# Patient Record
Sex: MALE | Race: WHITE | NOT HISPANIC OR LATINO | Employment: OTHER | URBAN - METROPOLITAN AREA
[De-identification: names, ages, dates, MRNs, and addresses within clinical notes are randomized per-mention and may not be internally consistent; named-entity substitution may affect disease eponyms.]

---

## 2017-01-03 ENCOUNTER — ALLSCRIPTS OFFICE VISIT (OUTPATIENT)
Dept: OTHER | Facility: OTHER | Age: 67
End: 2017-01-03

## 2017-02-07 ENCOUNTER — GENERIC CONVERSION - ENCOUNTER (OUTPATIENT)
Dept: OTHER | Facility: OTHER | Age: 67
End: 2017-02-07

## 2017-03-21 ENCOUNTER — ALLSCRIPTS OFFICE VISIT (OUTPATIENT)
Dept: OTHER | Facility: OTHER | Age: 67
End: 2017-03-21

## 2017-03-22 ENCOUNTER — GENERIC CONVERSION - ENCOUNTER (OUTPATIENT)
Dept: OTHER | Facility: OTHER | Age: 67
End: 2017-03-22

## 2017-03-29 ENCOUNTER — ALLSCRIPTS OFFICE VISIT (OUTPATIENT)
Dept: OTHER | Facility: OTHER | Age: 67
End: 2017-03-29

## 2017-05-30 ENCOUNTER — ALLSCRIPTS OFFICE VISIT (OUTPATIENT)
Dept: OTHER | Facility: OTHER | Age: 67
End: 2017-05-30

## 2017-06-08 ENCOUNTER — ALLSCRIPTS OFFICE VISIT (OUTPATIENT)
Dept: OTHER | Facility: OTHER | Age: 67
End: 2017-06-08

## 2017-06-22 ENCOUNTER — GENERIC CONVERSION - ENCOUNTER (OUTPATIENT)
Dept: OTHER | Facility: OTHER | Age: 67
End: 2017-06-22

## 2017-06-22 LAB
A/G RATIO (HISTORICAL): 1.8 (ref 1.2–2.2)
ALBUMIN SERPL BCP-MCNC: 4.2 G/DL (ref 3.6–4.8)
ALP SERPL-CCNC: 46 IU/L (ref 39–117)
ALT SERPL W P-5'-P-CCNC: 15 IU/L (ref 0–44)
AST SERPL W P-5'-P-CCNC: 14 IU/L (ref 0–40)
BILIRUB SERPL-MCNC: 0.6 MG/DL (ref 0–1.2)
BUN SERPL-MCNC: 17 MG/DL (ref 8–27)
BUN/CREA RATIO (HISTORICAL): 19 (ref 10–24)
CALCIUM SERPL-MCNC: 9.3 MG/DL (ref 8.6–10.2)
CHLORIDE SERPL-SCNC: 101 MMOL/L (ref 96–106)
CHOLEST SERPL-MCNC: 152 MG/DL (ref 100–199)
CO2 SERPL-SCNC: 24 MMOL/L (ref 18–29)
CREAT SERPL-MCNC: 0.9 MG/DL (ref 0.76–1.27)
EGFR AFRICAN AMERICAN (HISTORICAL): 103 ML/MIN/1.73
EGFR-AMERICAN CALC (HISTORICAL): 89 ML/MIN/1.73
GLUCOSE SERPL-MCNC: 84 MG/DL (ref 65–99)
HBA1C MFR BLD HPLC: 6.2 % (ref 4.8–5.6)
HDLC SERPL-MCNC: 51 MG/DL
HEPATITIS C ANTIBODY (HISTORICAL): <0.1 S/CO RATIO (ref 0–0.9)
INTERPRETATION (HISTORICAL): NORMAL
LDLC SERPL CALC-MCNC: 88 MG/DL (ref 0–99)
POTASSIUM SERPL-SCNC: 5 MMOL/L (ref 3.5–5.2)
SODIUM SERPL-SCNC: 141 MMOL/L (ref 134–144)
TOT. GLOBULIN, SERUM (HISTORICAL): 2.3 G/DL (ref 1.5–4.5)
TOTAL PROTEIN (HISTORICAL): 6.5 G/DL (ref 6–8.5)
TRIGL SERPL-MCNC: 67 MG/DL (ref 0–149)

## 2017-06-23 ENCOUNTER — GENERIC CONVERSION - ENCOUNTER (OUTPATIENT)
Dept: OTHER | Facility: OTHER | Age: 67
End: 2017-06-23

## 2017-06-27 ENCOUNTER — ALLSCRIPTS OFFICE VISIT (OUTPATIENT)
Dept: OTHER | Facility: OTHER | Age: 67
End: 2017-06-27

## 2017-07-13 ENCOUNTER — ALLSCRIPTS OFFICE VISIT (OUTPATIENT)
Dept: OTHER | Facility: OTHER | Age: 67
End: 2017-07-13

## 2017-07-13 DIAGNOSIS — R05.9 COUGH: ICD-10-CM

## 2017-07-17 ENCOUNTER — HOSPITAL ENCOUNTER (OUTPATIENT)
Dept: RADIOLOGY | Facility: HOSPITAL | Age: 67
Discharge: HOME | End: 2017-07-17
Payer: MEDICARE

## 2017-07-17 ENCOUNTER — TRANSCRIBE ORDERS (OUTPATIENT)
Dept: ADMINISTRATIVE | Facility: HOSPITAL | Age: 67
End: 2017-07-17

## 2017-07-17 DIAGNOSIS — R05.9 COUGH: ICD-10-CM

## 2017-07-17 PROCEDURE — 71020 HB CHEST X-RAY 2VW FRONTAL&LATL: CPT

## 2017-07-18 ENCOUNTER — GENERIC CONVERSION - ENCOUNTER (OUTPATIENT)
Dept: OTHER | Facility: OTHER | Age: 67
End: 2017-07-18

## 2017-08-08 ENCOUNTER — ALLSCRIPTS OFFICE VISIT (OUTPATIENT)
Dept: OTHER | Facility: OTHER | Age: 67
End: 2017-08-08

## 2017-09-06 ENCOUNTER — ALLSCRIPTS OFFICE VISIT (OUTPATIENT)
Dept: OTHER | Facility: OTHER | Age: 67
End: 2017-09-06

## 2017-09-25 ENCOUNTER — GENERIC CONVERSION - ENCOUNTER (OUTPATIENT)
Dept: OTHER | Facility: OTHER | Age: 67
End: 2017-09-25

## 2017-09-25 LAB
A/G RATIO (HISTORICAL): 1.7 (ref 1.2–2.2)
ALBUMIN SERPL BCP-MCNC: 4.4 G/DL (ref 3.6–4.8)
ALP SERPL-CCNC: 47 IU/L (ref 39–117)
ALT SERPL W P-5'-P-CCNC: 18 IU/L (ref 0–44)
AST SERPL W P-5'-P-CCNC: 18 IU/L (ref 0–40)
BILIRUB SERPL-MCNC: 0.8 MG/DL (ref 0–1.2)
BUN SERPL-MCNC: 13 MG/DL (ref 8–27)
BUN/CREA RATIO (HISTORICAL): 15 (ref 10–24)
CALCIUM SERPL-MCNC: 9.3 MG/DL (ref 8.6–10.2)
CHLORIDE SERPL-SCNC: 99 MMOL/L (ref 96–106)
CO2 SERPL-SCNC: 26 MMOL/L (ref 18–29)
CREAT SERPL-MCNC: 0.84 MG/DL (ref 0.76–1.27)
EGFR AFRICAN AMERICAN (HISTORICAL): 105 ML/MIN/1.73
EGFR-AMERICAN CALC (HISTORICAL): 91 ML/MIN/1.73
GLUCOSE SERPL-MCNC: 86 MG/DL (ref 65–99)
HBA1C MFR BLD HPLC: 6.2 % (ref 4.8–5.6)
POTASSIUM SERPL-SCNC: 4.5 MMOL/L (ref 3.5–5.2)
SODIUM SERPL-SCNC: 140 MMOL/L (ref 134–144)
TOT. GLOBULIN, SERUM (HISTORICAL): 2.6 G/DL (ref 1.5–4.5)
TOTAL PROTEIN (HISTORICAL): 7 G/DL (ref 6–8.5)

## 2017-09-26 ENCOUNTER — GENERIC CONVERSION - ENCOUNTER (OUTPATIENT)
Dept: OTHER | Facility: OTHER | Age: 67
End: 2017-09-26

## 2017-09-27 ENCOUNTER — GENERIC CONVERSION - ENCOUNTER (OUTPATIENT)
Dept: OTHER | Facility: OTHER | Age: 67
End: 2017-09-27

## 2017-09-27 ENCOUNTER — ALLSCRIPTS OFFICE VISIT (OUTPATIENT)
Dept: OTHER | Facility: OTHER | Age: 67
End: 2017-09-27

## 2017-10-06 ENCOUNTER — GENERIC CONVERSION - ENCOUNTER (OUTPATIENT)
Dept: OTHER | Facility: OTHER | Age: 67
End: 2017-10-06

## 2017-10-17 ENCOUNTER — ALLSCRIPTS OFFICE VISIT (OUTPATIENT)
Dept: OTHER | Facility: OTHER | Age: 67
End: 2017-10-17

## 2017-10-18 NOTE — PROGRESS NOTES
Assessment  1  Atherosclerosis of arteries of extremities (440 20) (I70 209)   2  Onychomycosis (110 1) (B35 1)   3  Diabetes mellitus with polyneuropathy (250 60,357 2) (E11 42)   4  Pain in both feet (729 5) (M79 671,M79 672)    Plan   · Follow-up visit in 9 weeks Evaluation and Treatment  Follow-up  Status: Hold For -  Scheduling  Requested for: 71HVJ9395   · Inspect your feet daily ; Status:Complete;   Done: 19NFC5542 09:51AM    Discussion/Summary    Daily foot checks  Discussed proper shoes discussed risk of ulceration and infection  multivitamin or biotin for cracking of nails  Discussed antifungal powder between toes daily  Daily foot checks monitor for signs of infection  The patient was counseled regarding instructions for management,-- risk factor reductions,-- patient and family education  Possible side effects of new medications were reviewed with the patient/guardian today  The treatment plan was reviewed with the patient/guardian  The patient/guardian understands and agrees with the treatment plan      Chief Complaint  Patient has painful calluses and thick painful nails  has had some cracking of the left hallux nail in the past month  No history of injury  also has occasional pain in left fourth digit does have contracted hammertoe thinks he may have broken it at some point a few years ago  But was never treated  History of Present Illness  HPI: History of RSD of the right lower extremity  He has pain in his toes which shoe wear  has not noticed any redness or signs of infection  Active Problems  1  Atherosclerosis of arteries of extremities (440 20) (I70 209)   2  Benign essential hypertension (401 1) (I10)   3  BMI 30 0-30 9,adult (V85 30) (Z68 30)   4  Callus (700) (L84)   5  Canker sore (528 2) (K12 0)   6  Cough (786 2) (R05)   7  Diabetes mellitus (250 00) (E11 9)   8  Diabetes mellitus with polyneuropathy (250 60,357 2) (E11 42)   9  Hyperlipidemia (272 4) (E78 5)   10   Impotence (607 84) (N52 9)   11  Myocardial infarction (410 90) (I21 9)   12  Nerve palsy (355 9) (G58 9)   13  Onychomycosis (110 1) (B35 1)   14  Pain in both feet (729 5) (M79 671,M79 672)   15  Peripheral neuropathy (356 9) (G62 9)   16  Reflex sympathetic dystrophy (337 20) (G90 50)   17   Tabes dorsalis (094 0) (A52 11)    Past Medical History   · History of Acquired ankle/foot deformity (736 70) (M21 969)   · History of Acute Myocardial Infarction (V12 59)   · History of Anxiety (300 00) (F41 9)   · History of BMI 27 0-27 9,adult (V85 23) (Z68 27)   · History of BMI 29 0-29 9,adult (V85 25) (Z68 29)   · History of Cardiomyopathy (425 4) (I42 9)   · History of Congestive heart failure (428 0) (I50 9)   · History of Congestive heart failure (428 0) (I50 9)   · History of Coronary Artery Disease (V12 59)   · History of Dermatitis of foot (692 9) (L30 9)   · History of Dysesthesia (782 0) (R20 8)   · History of Exposure to hepatitis (V01 79) (Z20 5)   · History of Fissure of skin (709 8) (R23 4)   · History of Foot slap (781 2) (R26 89)   · History of anemia (V12 3) (Z86 2)   · History of chest pain (V13 89) (K54 699)   · History of contact dermatitis (V13 3) (Z87 2)   · History of ingrown nail (V13 3) (Z87 2)   · History of Ingrowing nail with infection (703 0) (L60 0)   · Initial Medicare annual wellness visit (V70 0) (Z00 00)   · History of Injury of tip of finger of right hand (959 5) (S69 91XA)   · History of Mallet finger of right hand (736 1) (M20 011)   · History of Nails Onycholysis (703 8)   · History of Need for 23-polyvalent pneumococcal polysaccharide vaccine (V03 82) (Z23)   · History of Need for hepatitis C screening test (V73 89) (Z11 59)   · History of Need for influenza vaccination (V04 81) (Z23)   · Need for pneumococcal vaccination (V03 82) (Z23)   · History of Need for Tdap vaccination (V06 1) (Z23)   · History of Need for vaccination with 13-polyvalent pneumococcal conjugate vaccine  (V03 82) (Z23)   · 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 02)   · History of Paronychia of toe, unspecified laterality (681 11) (L03 039)   · History of Patellar bursitis of right knee (726 60) (M70 51)   · History of Right knee pain (719 46) (M25 561)   · History of Screening PSA (prostate specific antigen) (V76 44) (Z12 5)   · History of Subconjunctival hemorrhage, unspecified laterality (372 72) (H11 30)   · History of Xerosis cutis (706 8) (L85 3)    Surgical History   · History of Cath Stent Placement   · History of Cholecystectomy   · History of Hernia Repair    Family History  Mother    · Family history of    · Family history of cardiac disorder (V17 49) (Z80 55)  Father    · Family history of    · Family history of leukemia (V16 6) (Z80 6)  Sister    · Family history of Malignant neoplasm of ovary, unspecified laterality    Social History   · Denied: History of Alcohol Use (History)   · Never a smoker  The social history was reviewed and is unchanged  Current Meds   1  Aspirin 325 MG Oral Tablet; TAKE 1 TABLET DAILY; Therapy: (Recorded:2014) to Recorded   2  Culinary Agents In [employer]; test once daily; Therapy: 34Npi4393 to (Evaluate:25Fnr2237)  Requested for: 20Nje9618; Last   Rx:14Iaa7339 Ordered   3  Rosa Maria Microlet Lancets Miscellaneous; TEST ONCE DAILY; Therapy: 70Jrk3420 to (Last Rx:05Xys4158)  Requested for: 85Aos4886 Ordered   4  Clopidogrel Bisulfate 75 MG Oral Tablet; TAKE 1 TABLET DAILY  Requested for:   39RUL3573; Last Rx:22Dwu2775 Ordered   5  Contour Next One KIT; USE AS DIRECTED; Therapy: 90HTL5890 to (Last CI:06PPV8141)  Requested for: 29ZOP2717 Ordered   6  Gabapentin 100 MG Oral Capsule; TAKE 1 CAPSULE TWICE DAILY takes with   gabapentin 400mg qid  Requested for: 51NMV8633; Last Rx:89Uhz2023 Ordered   7  Gabapentin 400 MG Oral Capsule; TAKE 1 CAPSULE 4 TIMES DAILY  Requested for:   68THI2690; Last Rx:04Asw8025 Ordered   8   Glimepiride 2 MG Oral Tablet; TAKE ONE TABLET BY MOUTH EVERY DAY AS   DIRECTED; Therapy: 25LIE4401 to (Evaluate:64Xug3857)  Requested for: 46PSQ2044; Last   Rx:29Mar2017 Ordered   9  Metoprolol Tartrate 25 MG Oral Tablet; TAKE 1 TABLET DAILY  Requested for:   05HJR3510; Last Rx:11Aug2015 Ordered   10  Pravastatin Sodium 40 MG Oral Tablet; TAKE 1 TABLET DAILY  Requested for:    79WJE5164; Last Rx:11Aug2015 Ordered   11  ValACYclovir HCl - 1 GM Oral Tablet; 2 tablets Q12 times two doses at start of canker    sore; Therapy: 35NUF1501 to (Last Rx:80Hgm6997)  Requested for: 09Efi1582 Ordered    The medication list was reviewed and updated today  Allergies  1  No Known Drug Allergies    Vitals   Recorded: 70CGQ5302 09:36AM   BP Comments Patient refused   Height 5 ft 9 in   Weight 209 lb    BMI Calculated 30 86   BSA Calculated 2 1     Physical Exam    Constitutional: no acute distress, well appearing and well nourished  Orthopedic/Biomechanical: ingrown nails  Skin: Left hallux, decreased erythema decreased exudate, resolving, infection  Neurologic: decreased vibration sensation  Psychiatric: oriented to person, place, and time  Left Foot: Appearance: Normal except as noted: excessive pronation-- and-- pes planus  Great toe deformities include a bunion  Second toe deformities include hammer toe  Third toe deformities include hammer toe  Forth toe deformities include hammer toe  Fifth toe deformities include hammer toe  Evaluation of the great toe nail demonstrates paronychia-- and-- an ingrown nail  Evaluation of the fourth toe nail demonstrates subungual hematoma  Dorathy Infield +1 pitting edema bilateral feet and ankles negative calf tenderness negative Homans sign  ROM: Full  Special Tests: rash left hallux nail is cracked distal border has mild thickening but no signs of infection  Left fourth digit adductovarus hammer toe no pain on palpation or range of motion     Right Foot: Appearance: Normal except as noted: excessive pronation-- and-- pes planus  Second toe deformities include hammer toe  Third toe deformities include hammer toe  Forth toe deformities include hammer toe  Fifth toe deformities include hammer toe  Evaluation of the great toe nail demonstrates an ingrown nail-- and-- subungual hematoma, but-- no paronychia  No erythema no signs of infection mild ingrown  Hyperkeratotic lesions distal second digit bilateral first met head bilateral negative ulceration negative erythema negative exudate  ROM: Full  Special Tests: mild xerosis no maceration  Left Ankle: ROM: limited ROM in all planes Motor: diffuse weakness  Right Ankle: ROM: limited ROM in all planes Motor: diffuse weakness  Neurological Exam: performed  Light touch was intact bilaterally  Vibratory sensation was decreased in the right ankle-- and-- decreased in the left first metatarsophalangeal joint  Response to monofilament test was intact bilaterally  Vascular Exam: performed Dorsalis pedis pulses were 2/4 bilaterally  Posterior tibial pulses were 1/4 bilaterally  Capillary refill time was between 1-3 seconds bilaterally  Toenails: All of the toenails were elongated,-- hypertrophied,-- tender-- and-- Mycotic with dystrophy  Diminished tactile sensation with monofilament testing throughout the right foot  Diminished tactile sensation with monofilament testing throughout the left foot  Capillary refills findings on the right were delayed in the toes  Pulses:   0 in the posterior tibialis on the right   1+ in the dorsalis pedis on the right  Capillary refills findings on the left were delayed in the toes  Pulses:   0 in the posterior tibialis on the left   1+ in the dorsalis pedis on the left  Assign Risk Category: 2: Loss of protective sensation with or without weakness, deformity, callus, pre-ulcer, or history of ulceration  High risk     Hyperkeratosis: present on both second toes,-- present on the right second toe,-- present on both first sub metatarsals-- and-- present on both fifth sub metatarsals  Shoe Gear Evaluation: performed ()  Right Foot: width: e-- and-- length: 11  Left Foot: width: e-- and-- length: 11  Recommendation(s): extra depth diabetic shoes-- and-- SAS style  Procedure  Aseptic debridement of hyperkeratotic lesions fifth met head and Second digit bilateraldebridement and planing of nails ×10, with nail nipper and nail , no complications      Future Appointments    Date/Time Provider Specialty Site   12/22/2017 09:45 AM Yahaira Johnson, [address] [place] [address]     Signatures   Electronically signed by :  Monique Diana DPM; Oct 17 2017  9:53AM EST                       (Author)

## 2017-12-16 ENCOUNTER — GENERIC CONVERSION - ENCOUNTER (OUTPATIENT)
Dept: OTHER | Facility: OTHER | Age: 67
End: 2017-12-16

## 2017-12-16 LAB
A/G RATIO (HISTORICAL): 1.7 (ref 1.2–2.2)
ALBUMIN SERPL BCP-MCNC: 4.5 G/DL (ref 3.6–4.8)
ALP SERPL-CCNC: 50 IU/L (ref 39–117)
ALT SERPL W P-5'-P-CCNC: 18 IU/L (ref 0–44)
AST SERPL W P-5'-P-CCNC: 14 IU/L (ref 0–40)
BILIRUB SERPL-MCNC: 0.6 MG/DL (ref 0–1.2)
BUN SERPL-MCNC: 14 MG/DL (ref 8–27)
BUN/CREA RATIO (HISTORICAL): 14 (ref 10–24)
CALCIUM SERPL-MCNC: 9.7 MG/DL (ref 8.6–10.2)
CHLORIDE SERPL-SCNC: 101 MMOL/L (ref 96–106)
CHOLEST SERPL-MCNC: 153 MG/DL (ref 100–199)
CO2 SERPL-SCNC: 27 MMOL/L (ref 18–29)
CREAT SERPL-MCNC: 0.97 MG/DL (ref 0.76–1.27)
EGFR AFRICAN AMERICAN (HISTORICAL): 94 ML/MIN/1.73
EGFR-AMERICAN CALC (HISTORICAL): 81 ML/MIN/1.73
GLUCOSE SERPL-MCNC: 93 MG/DL (ref 65–99)
HDLC SERPL-MCNC: 53 MG/DL
LDLC SERPL CALC-MCNC: 78 MG/DL (ref 0–99)
POTASSIUM SERPL-SCNC: 4.9 MMOL/L (ref 3.5–5.2)
SODIUM SERPL-SCNC: 142 MMOL/L (ref 134–144)
TOT. GLOBULIN, SERUM (HISTORICAL): 2.7 G/DL (ref 1.5–4.5)
TOTAL PROTEIN (HISTORICAL): 7.2 G/DL (ref 6–8.5)
TRIGL SERPL-MCNC: 110 MG/DL (ref 0–149)

## 2017-12-17 ENCOUNTER — GENERIC CONVERSION - ENCOUNTER (OUTPATIENT)
Dept: OTHER | Facility: OTHER | Age: 67
End: 2017-12-17

## 2017-12-17 LAB
HBA1C MFR BLD HPLC: 6.2 % (ref 4.8–5.6)
INTERPRETATION (HISTORICAL): NORMAL

## 2017-12-22 ENCOUNTER — ALLSCRIPTS OFFICE VISIT (OUTPATIENT)
Dept: OTHER | Facility: OTHER | Age: 67
End: 2017-12-22

## 2017-12-23 NOTE — PROGRESS NOTES
Assessment   1  Benign essential hypertension (401 1) (I10)   2  Diabetes mellitus with polyneuropathy (250 60,357 2) (E11 42)   3  Hyperlipidemia (272 4) (E78 5)   4  Never a smoker    Plan   Diabetes mellitus with polyneuropathy    · (1) COMPREHENSIVE METABOLIC PANEL; Status:Active; Requested for:01Mar2018;    · (1) HEMOGLOBIN A1C; Status:Active; Requested for:01Mar2018;    · (1) MICROALBUMIN CREATININE RATIO, RANDOM URINE; Status:Active; Requested    for:01Mar2018;    · Follow-up visit in 3 months Evaluation and Treatment  Follow-up  Status: Complete -    Scheduling  Done: 93YPR3932 10:06AM  Hyperlipidemia    · Pravastatin Sodium 40 MG Oral Tablet; TAKE 1 TABLET DAILY  Myocardial infarction    · Clopidogrel Bisulfate 75 MG Oral Tablet (Plavix); TAKE 1 TABLET DAILY   · Metoprolol Tartrate 25 MG Oral Tablet; TAKE 1 TABLET DAILY    Discussion/Summary      Hypertension - Well controlled  - Well controlled A1c is 6 2  - Well controlled  Chief Complaint   pt present for 3 month follow up  af/rma      History of Present Illness   He has felt cranky over the past few years  The patient states his hyperlipidemia has been under good control since the last visit  Symptoms:      Medications: the patient is adherent with his medication regimen  -- He denies medication side effects  The patient presents for follow-up of essential hypertension  The patient states he has been doing well with his blood pressure control since the last visit  Symptoms:      Medications: the patient is adherent with his medication regimen  -- He denies medication side effects  Review of Systems        Constitutional: No fever or chills, feels well, no tiredness, no recent weight gain or weight loss  Cardiovascular: No complaints of slow heart rate, no fast heart rate, no chest pain, no palpitations, no leg claudication, no lower extremity  Active Problems   1   Atherosclerosis of arteries of extremities (440 20) (I70 209)   2  Benign essential hypertension (401 1) (I10)   3  BMI 30 0-30 9,adult (V85 30) (Z68 30)   4  Callus (700) (L84)   5  Canker sore (528 2) (K12 0)   6  Diabetes mellitus (250 00) (E11 9)   7  Diabetes mellitus with polyneuropathy (250 60,357 2) (E11 42)   8  Hyperlipidemia (272 4) (E78 5)   9  Impotence (607 84) (N52 9)   10  Myocardial infarction (410 90) (I21 9)   11  Nerve palsy (355 9) (G58 9)   12  Onychomycosis (110 1) (B35 1)   13  Pain in both feet (729 5) (M79 671,M79 672)   14  Peripheral neuropathy (356 9) (G62 9)   15  Reflex sympathetic dystrophy (337 20) (G90 50)   16  Tabes dorsalis (094 0) (A52 11)    Past Medical History   1  History of Acquired ankle/foot deformity (736 70) (M21 969)   2  History of Acute Myocardial Infarction (V12 59)   3  History of Anxiety (300 00) (F41 9)   4  History of BMI 27 0-27 9,adult (V85 23) (Z68 27)   5  History of BMI 29 0-29 9,adult (V85 25) (Z68 29)   6  History of Cardiomyopathy (425 4) (I42 9)   7  History of Congestive heart failure (428 0) (I50 9)   8  History of Congestive heart failure (428 0) (I50 9)   9  History of Coronary Artery Disease (V12 59)   10  History of Dermatitis of foot (692 9) (L30 9)   11  History of Dysesthesia (782 0) (R20 8)   12  History of Exposure to hepatitis (V01 79) (Z20 5)   13  History of Fissure of skin (709 8) (R23 4)   14  History of Foot slap (781 2) (R26 89)   15  History of anemia (V12 3) (Z86 2)   16  History of chest pain (V13 89) (Z87 898)   17  History of contact dermatitis (V13 3) (Z87 2)   18  History of cough   19  History of ingrown nail (V13 3) (Z87 2)   20  History of Ingrowing nail with infection (703 0) (L60 0)   21  Initial Medicare annual wellness visit (V70 0) (Z00 00)   22  History of Injury of tip of finger of right hand (959 5) (S69 91XA)   23  History of Mallet finger of right hand (736 1) (M20 011)   24  History of Nails Onycholysis (703 8)   25   History of Need for 23-polyvalent pneumococcal polysaccharide vaccine (V03 82) (Z23)   26  History of Need for hepatitis C screening test (V73 89) (Z11 59)   27  History of Need for influenza vaccination (V04 81) (Z23)   28  Need for pneumococcal vaccination (V03 82) (Z23)   29  History of Need for Tdap vaccination (V06 1) (Z23)   30  History of Need for vaccination with 13-polyvalent pneumococcal conjugate vaccine      (V03 82) (Z23)   31  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 02)   32  History of Paronychia of toe, unspecified laterality (681 11) (L03 039)   33  History of Patellar bursitis of right knee (726 60) (M70 51)   34  History of Right knee pain (719 46) (M25 561)   35  History of Screening PSA (prostate specific antigen) (V76 44) (Z12 5)   36  History of Subconjunctival hemorrhage, unspecified laterality (372 72) (H11 30)   37  History of Xerosis cutis (706 8) (L85 3)    Surgical History   1  History of Cath Stent Placement   2  History of Cholecystectomy   3  History of Hernia Repair    Family History   Mother    1  Family history of    2  Family history of cardiac disorder (V17 49) (Z82 49)  Father    3  Family history of    4  Family history of leukemia (V16 6) (Z80 6)  Sister    5  Family history of Malignant neoplasm of ovary, unspecified laterality    Social History    · Denied: History of Alcohol Use (History)   · Never a smoker  The social history was reviewed and updated today  Current Meds    1  Aspirin 325 MG Oral Tablet; TAKE 1 TABLET DAILY; Therapy: (Recorded:2014) to Recorded   2  Velocomp In [employer]; test once daily; Therapy: 14Jen1664 to (Evaluate:09Ihp7218)  Requested for: 09Syv5100; Last     Rx:01Wbs4148 Ordered   3  Rosa Maria Microlet Lancets Miscellaneous; TEST ONCE DAILY; Therapy: 05Jes1787 to (Last Rx:88Qgy0318)  Requested for: 29Den6553 Ordered   4   Clopidogrel Bisulfate 75 MG Oral Tablet; TAKE 1 TABLET DAILY  Requested for:     31FIK2529; Last Rx:2015 Ordered   5  Contour Next One KIT; USE AS DIRECTED; Therapy: 47RRG4808 to (Last YQ:44VRM5268)  Requested for: 30JLI3910 Ordered   6  Gabapentin 100 MG Oral Capsule; TAKE 1 CAPSULE TWICE DAILY takes with     gabapentin 400mg qid  Requested for: 16CDS1909; Last Rx:82Osi6654 Ordered   7  Gabapentin 400 MG Oral Capsule; TAKE 1 CAPSULE 4 TIMES DAILY  Requested for:     88RSD1466; Last Rx:68Hsz1854 Ordered   8  Glimepiride 2 MG Oral Tablet; TAKE ONE TABLET BY MOUTH EVERY DAY AS DIRECTED; Therapy: 40YWT9721 to (Courtneyry Shearing)  Requested for: 21Nov2017; Last     Rx:21Nov2017 Ordered   9  Metoprolol Tartrate 25 MG Oral Tablet; TAKE 1 TABLET DAILY  Requested for:     14GBE1806; Last Rx:11Aug2015 Ordered   10  Pravastatin Sodium 40 MG Oral Tablet; TAKE 1 TABLET DAILY  Requested for:      02DYL5903; Last Rx:11Aug2015 Ordered    Allergies   1  No Known Drug Allergies    Vitals   Vital Signs    Recorded: 22Dec2017 09:32AM   Temperature 97 8 F   Heart Rate 68   Respiration 16   Systolic 966   Diastolic 74   Height 5 ft 9 in   Weight 207 lb 2 oz   BMI Calculated 30 59   BSA Calculated 2 1     Physical Exam        Constitutional      General appearance: No acute distress, well appearing and well nourished  Ears, Nose, Mouth, and Throat      External inspection of ears and nose: Normal        Otoscopic examination: Tympanic membrance translucent with normal light reflex  Canals patent without erythema  Oropharynx: Normal with no erythema, edema, exudate or lesions  Pulmonary      Auscultation of lungs: Clear to auscultation, equal breath sounds bilaterally, no wheezes, no rales, no rhonci  no rales or crackles were heard bilaterally  no rhonchi  no friction rub  no wheezing  Cardiovascular      Auscultation of heart: Normal rate and rhythm, normal S1 and S2, without murmurs         Musculoskeletal      Gait and station: Normal           Health Management   Diabetes mellitus with neuropathy   *VB - Eye Exam; every 1 year; Last 98LHI0633; Next Due: 93DHD4977;  Active    Future Appointments      Date/Time Provider Specialty Site   01/03/2018 09:30 AM Kaye Davis DPM Podiatry [address] [facility]    03/20/2018 09:15 AM Yahaira Koehler, [address] [place] [address]     Signatures    Electronically signed by : Travon Herman DO; Dec 22 2017 10:08AM EST                       (Author)

## 2018-01-03 ENCOUNTER — ALLSCRIPTS OFFICE VISIT (OUTPATIENT)
Dept: OTHER | Facility: OTHER | Age: 68
End: 2018-01-03

## 2018-01-10 NOTE — PROGRESS NOTES
Assessment    1  Callus (700) (L84)   2  Diabetes mellitus with neuropathy (250 60,357 2) (E11 40)   3  Atherosclerosis of arteries of extremities (440 20) (I70 209)   4  Onychomycosis (110 1) (B35 1)   5  Pain in both feet (729 5) (M79 671,M79 672)    Plan    · *VB-Foot Exam; Status:Complete;   Done: 53FRT2594 09:25AM    · Follow-up visit in 2 months Evaluation and Treatment  Follow-up  Status: Hold For -  Scheduling  Requested for: 12UOO5973   · Diabetes Foot Exam Performed; Status:Complete;   Done: 82HIZ8835 09:25AM   · Inspect your feet daily ; Status:Complete;   Done: 37HYR2886 09:25AM    Discussion/Summary    Discussed proper diabetic foot care daily foot checks  Discussed diabetic shoes  Discussed risks of ulceration and infection  Possible side effects of new medications were reviewed with the patient/guardian today  The treatment plan was reviewed with the patient/guardian  The patient/guardian understands and agrees with the treatment plan      Chief Complaint  Patient has painful calluses and thick painful nails that hurt when walking and wearing shoes  Patient has some burning and numbness in feet  Says blood sugar has been well controlled  Says his feet are very cold and sometimes gets swollen at the end of day      History of Present Illness  HPI: History of RSD of the right lower extremity  He has pain in his toes which shoe wear  Active Problems    1  Acquired ankle/foot deformity (736 70) (M21 969)   2  Atherosclerosis of arteries of extremities (440 20) (I70 209)   3  Benign essential hypertension (401 1) (I10)   4  Callus (700) (L84)   5  Chest pain (786 50) (R07 9)   6  Controlled diabetes mellitus (250 00) (E11 9)   7  Coronary arteriosclerosis (414 00) (I25 10)   8  Coronary arteriosclerosis in native artery (414 01) (I25 10)   9  Cough (786 2) (R05)   10  Diabetes mellitus with neuropathy (250 60,357 2) (E11 40)   11  Diabetic neuropathy (250 60,357 2) (E11 40)   12   Dysesthesia (782  0) (R20 8)   13  Hyperlipidemia (272 4) (E78 5)   14  Ingrowing nail with infection (703 0) (L60 0)   15  Injury of tip of finger of right hand (959 5) (S69 91XA)   16  Mallet finger of right hand (736 1) (M20 011)   17  Nails Onycholysis (703 8)   18  Need for 23-polyvalent pneumococcal polysaccharide vaccine (V03 82) (Z23)   19  Need for influenza vaccination (V04 81) (Z23)   20  Need for vaccination with 13-polyvalent pneumococcal conjugate vaccine (V03 82) (Z23)   21  Onychomycosis (110 1) (B35 1)   22  Pain in both feet (729 5) (M79 671,M79 672)   23  Pain in extremity (729 5) (M79 609)   24  Paronychia of toe, unspecified laterality (681 11) (L03 039)   25  Peripheral neuropathy (356 9) (G62 9)   26  Reflex sympathetic dystrophy (337 20) (G90 50)   27  Screening PSA (prostate specific antigen) (V76 44) (Z12 5)   28  Subconjunctival hemorrhage, unspecified laterality (372 72) (H11 30)   29  Sympathetic reflex dystrophy, lower limb (337 22) (G90 529)   30  Xerosis cutis (706 8) (L85 3)    Past Medical History    · History of Acute Myocardial Infarction (V12 59)   · History of Anxiety (300 00) (F41 9)   · History of Cardiomyopathy (425 4) (I42 9)   · History of Congestive heart failure (428 0) (I50 9)   · History of Congestive heart failure (428 0) (I50 9)   · History of Coronary Artery Disease (V12 59)   · History of anemia (V12 3) (Z86 2)    Family History    · Family history of    · Family history of cardiac disorder (V17 49) (Z82 49)    · Family history of    · Family history of leukemia (V16 6) (Z80 6)    · Family history of Malignant neoplasm of ovary, unspecified laterality    Social History    · Denied: History of Alcohol Use (History)   · Never A Smoker  The social history was reviewed and is unchanged  Current Meds   1  Aspirin 325 MG Oral Tablet; TAKE 1 TABLET DAILY; Therapy: (Recorded:2014) to Recorded   2   Rosa [employer] Contour Next Test In [employer]; TEST ONCE DAILY; Therapy: 45Agd6387 to (Evaluate:28Oct2016)  Requested for: 30XOS0164; Last   Rx:03Nov2015 Ordered   3  Rosa Maria Microlet Lancets Miscellaneous; TEST ONCE DAILY; Therapy: 40Tnt5810 to (Evaluate:16Apr2017)  Requested for: [phone]; Last   Rx:94Bzt2215 Ordered   4  Clopidogrel Bisulfate 75 MG Oral Tablet; TAKE 1 TABLET DAILY  Requested for:   11Aug2015; Last Rx:11Aug2015 Ordered   5  Gabapentin 100 MG Oral Capsule; TAKE 1 CAPSULE TWICE DAILY takes with   gabapentin 400mg qid  Requested for: 11Aug2015; Last Rx:27Lns1306 Ordered   6  Gabapentin 400 MG Oral Capsule; TAKE 1 CAPSULE 4 TIMES DAILY  Requested for:   11Aug2015; Last Rx:11Aug2015 Ordered   7  Glimepiride 2 MG Oral Tablet; TAKE ONE TABLET BY MOUTH EVERY DAY AS   DIRECTED; Therapy: 41LEJ2313 to (Evaluate:03Zkk9104)  Requested for: 11Aug2015; Last   Rx:11Aug2015 Ordered   8  Metoprolol Tartrate 25 MG Oral Tablet; TAKE 1 TABLET DAILY  Requested for:   11Aug2015; Last Rx:11Aug2015 Ordered   9  Pravastatin Sodium 40 MG Oral Tablet; TAKE 1 TABLET DAILY  Requested for:   11Aug2015; Last Rx:11Aug2015 Ordered    The medication list was reviewed and updated today  Allergies    1  No Known Drug Allergies    Vitals   Recorded: 78SAB0738 93:69CD   Systolic 651   Diastolic 90   Height 6 ft 8 5 in   Weight 189 lb    BMI Calculated 20 51   BSA Calculated 2 26     Physical Exam    Constitutional: no acute distress, well appearing and well nourished  Orthopedic/Biomechanical: ingrown nails  Skin: Left hallux, decreased erythema decreased exudate, resolving, infection  Neurologic: decreased vibration sensation  Psychiatric: oriented to person, place, and time  Left Foot: Appearance: Normal except as noted: excessive pronation and pes planus  Great toe deformities include a bunion  Second toe deformities include hammer toe  Third toe deformities include hammer toe  Forth toe deformities include hammer toe  Fifth toe deformities include hammer toe   Evaluation of the great toe nail demonstrates no paronychia and no ingrown nail  Evaluation of the fourth toe nail demonstrates subungual hematoma  Negative erythema negative signs of infection negative ulceration  ROM: Full  Special Tests: Hyperkeratotic lesions distal second digit bilateral first digit bilateral negative erythema negative exudate negative ulceration  Right Foot: Appearance: Normal except as noted: excessive pronation and pes planus  Second toe deformities include hammer toe  Third toe deformities include hammer toe  Forth toe deformities include hammer toe  Fifth toe deformities include hammer toe  Hyperkeratotic lesions distal second digit bilateral first met head bilateral negative ulceration negative erythema negative exudate  ROM: Full  Special Tests: Negative ulceration negative maceration  Left Ankle: ROM: limited ROM in all planes Motor: diffuse weakness  Right Ankle: ROM: limited ROM in all planes Motor: diffuse weakness  Neurological Exam: performed  Light touch was intact bilaterally  Vibratory sensation was decreased in the right ankle and decreased in the left first metatarsophalangeal joint  Response to monofilament test was intact bilaterally  Vascular Exam: performed Dorsalis pedis pulses were 2/4 bilaterally  Posterior tibial pulses were 1/4 bilaterally  Capillary refill time was between 1-3 seconds bilaterally  Toenails: All of the toenails were elongated, hypertrophied, tender and Mycotic with dystrophy  Socks and shoes removed, the Right Foot: the foot was dry and with a(n) ~Ucm3 callus  The sensory exam showed diminished vibratory sensation at the level of the toes, but normal position sense at the level of the toes  Diminished tactile sensation with monofilament testing throughout the right foot  Socks and shoes removed, Left Foot: the foot was dry and with a(n) ~Ucm3 callus     The sensory exam showed diminished vibratory sensation at the level of the toes, but normal position sense at the level of the toes  Diminished tactile sensation with monofilament testing throughout the left foot  Capillary refills findings on the right were delayed in the toes  Pulses:   0 in the posterior tibialis on the right   1+ in the dorsalis pedis on the right  Capillary refills findings on the left were delayed in the toes  Pulses:   0 in the posterior tibialis on the left   1+ in the dorsalis pedis on the left  Assign Risk Category: 2: Loss of protective sensation with or without weakness, deformity, callus, pre-ulcer, or history of ulceration  High risk  Hyperkeratosis: present on both second toes and present on both first sub metatarsals  Shoe Gear Evaluation: performed ()  Right Foot: width: e and length: 11  Left Foot: width: e and length: 11  Recommendation(s): extra depth diabetic shoes and SAS style  Procedure  Aseptic debridement tyloma x4  Aseptic debridement and planing of nails x10, manually, and mechanically  Future Appointments    Date/Time Provider Specialty Site   02/11/2016 01:00 PM Chelsie Espinoza DO [facility]     Signatures   Electronically signed by :  Earline Jo DPM; Jan 19 2016  9:28AM EST                       (Author)

## 2018-01-10 NOTE — RESULT NOTES
Discussion/Summary   Appointment 9/27/17   Please print and put in my folder     Dr Oskar Burris      Verified Results  (1) COMPREHENSIVE METABOLIC PANEL 46NHC6472 49:94OK Francine Samuel     Test Name Result Flag Reference   Glucose, Serum 86 mg/dL  65-99   BUN 13 mg/dL  8-27   Creatinine, Serum 0 84 mg/dL  0 76-1 27   BUN/Creatinine Ratio 15  10-24   Sodium, Serum 140 mmol/L  134-144   Potassium, Serum 4 5 mmol/L  3 5-5 2   Chloride, Serum 99 mmol/L     Carbon Dioxide, Total 26 mmol/L  18-29   Calcium, Serum 9 3 mg/dL  8 6-10 2   Protein, Total, Serum 7 0 g/dL  6 0-8 5   Albumin, Serum 4 4 g/dL  3 6-4 8   Globulin, Total 2 6 g/dL  1 5-4 5   A/G Ratio 1 7  1 2-2 2   Bilirubin, Total 0 8 mg/dL  0 0-1 2   Alkaline Phosphatase, S 47 IU/L     AST (SGOT) 18 IU/L  0-40   ALT (SGPT) 18 IU/L  0-44   eGFR If NonAfricn Am 91 mL/min/1 73  >59   eGFR If Africn Am 105 mL/min/1 73  >59

## 2018-01-11 NOTE — PROGRESS NOTES
Assessment    1  Initial Medicare annual wellness visit (V70 0) (Z00 00)   2  Never a smoker   3  Diabetes mellitus (250 00) (E11 9)    Plan  Diabetes mellitus    · (1) COMPREHENSIVE METABOLIC PANEL; Status:Active; Requested for:56Hha9537;    · (1) HEMOGLOBIN A1C; Status:Active; Requested for:53Wxx1955;    · Follow-up visit in 3 months Evaluation and Treatment  Follow-up  Status: Complete -  Scheduling  Done: 35XLD9999 09:34AM    Discussion/Summary    Care plan given   Diabetes - improved, A1c is down to 6 2  Advised of risks of hypoglycemia and increased risk of heart attack with low blood sugars  he has not had any low sugars, but will be vigilant about them  Impression: Initial Annual Wellness Visit, with preventive exam as well as age and risk appropriate counseling completed  Cardiovascular screening and counseling: screening is current  Diabetes screening and counseling: screening not indicated and has diabetes  Colorectal cancer screening and counseling: screening is current  Prostate cancer screening and counseling: screening is current  Osteoporosis screening and counseling: screening not indicated  Abdominal aortic aneurysm screening and counseling: screening not indicated  Glaucoma screening and counseling: screening is current  HIV screening and counseling: screening not indicated  Immunizations: influenza vaccine is up to date this year, the lifetime pneumococcal vaccine has been completed, hepatitis B vaccination series is not indicated at this time due to the patient's low risk of brandon the disease and Zostavax vaccination up to date  Advance Directive Planning: not complete, paperwork and instructions were given to the patient, he was encouraged to follow-up with me to discuss his questions and/or decisions  Patient Discussion: plan discussed with the patient, follow-up visit needed in 3 months        Chief Complaint  annual wellness visit      History of Present Illness  HPI: He is watching his sugars  If he eats something not good for him it does go up  Welcome to [employer] and Wellness Visits: The patient is being seen for the initial annual wellness visit  Medicare Screening and Risk Factors   Hospitalizations: no previous hospitalizations and he has been hospitalized 0 times  Medicare Screening Tests Risk Questions   Osteoporosis risk assessment:  and over 48years of age  HIV risk assessment: none indicated  Drug and Alcohol Use: The patient has never smoked cigarettes  The patient reports never drinking alcohol  He has never used illicit drugs  Diet and Physical Activity: Current diet includes well balanced meals, low fat food choices, low carbohydrate food choices, low salt food choices, 2/3 servings of fruit per day, 1/2 servings of vegetables per day and 1 servings of meat per day  He exercises infrequently  Exercise: walking  Mood Disorder and Cognitive Impairment Screening: Anxiety screening denies anxiety  Depression screening  negative for symptoms  He denies feeling down, depressed, or hopeless over the past two weeks  He denies feeling little interest or pleasure in doing things over the past two weeks  Cognitive impairment screening: denies difficulty learning/retaining new information, denies difficulty handling complex tasks, denies difficulty with reasoning, denies difficulty with spatial ability and orientation, denies difficulty with language and denies difficulty with behavior  Functional Ability/Level of Safety: Hearing is normal bilaterally and a hearing aid is not used  He denies hearing difficulties  The patient is currently able to do activities of daily living without limitations, able to do instrumental activities of daily living without limitations, able to participate in social activities without limitations and able to drive without limitations   Activities of daily living details: does not need help using the phone, no transportation help needed, does not need help shopping, no meal preparation help needed, does not need help doing housework, does not need help doing laundry, does not need help managing medications and does not need help managing money  Fall risk factors: The patient fell 0 times in the past 12 months  Home safety risk factors:  no unfamiliar surroundings, no loose rugs, no poor household lighting, no uneven floors, no household clutter, grab bars in the bathroom and handrails on the stairs  Advance Directives: Advance directives: If there is something that can be done he wants it done, if its impossible don't do it , but no living will, no durable power of  for health care directives and no advance directives  end of life decisions were reviewed with the patient and I agree with the patient's decisions  Co-Managers and Medical Equipment/Suppliers: See Patient Care Team      Patient Care Team    Care Team Member Role Specialty Office Number   Norm Rausch [address] Specialist Optometry [phone]   Lai Cortes MD Specialist Cardiology [phone]   Lus Hashimoto MD Specialist Neurology [phone], [facility] [phone]     Active Problems    1  Atherosclerosis of arteries of extremities (440 20) (I70 209)   2  Benign essential hypertension (401 1) (I10)   3  BMI 30 0-30 9,adult (V85 30) (Z68 30)   4  Callus (700) (L84)   5  Diabetes mellitus (250 00) (E11 9)   6  Hyperlipidemia (272 4) (E78 5)   7  Myocardial infarction (410 90) (I21 3)   8  Nerve palsy (355 9) (G58 9)   9  Pain in both feet (729 5) (M79 671,M79 672)   10  Peripheral neuropathy (356 9) (G62 9)   11  Reflex sympathetic dystrophy (337 20) (G90 50)   12   Tabes dorsalis (094 0) (A52 11)    Past Medical History    · History of Acute Myocardial Infarction (V12 59)   · History of Anxiety (300 00) (F41 9)   · History of BMI 27 0-27 9,adult (V85 23) (Z68 27)   · History of BMI 29 0-29 9,adult (V85 25) (Z68 29)   · History of Cardiomyopathy (425 4) (I42 9)   · History of Congestive heart failure (428 0) (I50 9)   · History of Congestive heart failure (428 0) (I50 9)   · History of Coronary Artery Disease (V12 59)   · History of Cough (786 2) (R05)   · History of Dermatitis of foot (692 9) (L30 9)   · History of Dysesthesia (782 0) (R20 8)   · History of Exposure to hepatitis (V01 79) (Z20 5)   · History of Fissure of skin (709 8) (R23 4)   · History of anemia (V12 3) (Z86 2)   · History of chest pain (V13 89) (M29 624)   · History of contact dermatitis (V13 3) (Z87 2)   · History of ingrown nail (V13 3) (Z87 2)   · History of Ingrowing nail with infection (703 0) (L60 0)   · History of Injury of tip of finger of right hand (959 5) (S69 91XA)   · History of Nails Onycholysis (703 8)   · History of Need for 23-polyvalent pneumococcal polysaccharide vaccine (V03 82) (Z23)   · History of Need for hepatitis C screening test (V73 89) (Z11 59)   · History of Need for influenza vaccination (V04 81) (Z23)   · History of Need for Tdap vaccination (V06 1) (Z23)   · History of Need for vaccination with 13-polyvalent pneumococcal conjugate vaccine  (V03 82) (Z23)   · History of Paronychia of toe, unspecified laterality (681 11) (L03 039)   · History of Patellar bursitis of right knee (726 60) (M70 51)   · History of Right knee pain (719 46) (M25 561)   · History of Screening PSA (prostate specific antigen) (V76 44) (Z12 5)   · History of Subconjunctival hemorrhage, unspecified laterality (372 72) (H11 30)    Surgical History    · History of Cath Stent Placement   · History of Cholecystectomy   · History of Hernia Repair    Family History  Mother    · Family history of    · Family history of cardiac disorder (V17 49) (Z80 55)  Father    · Family history of    · Family history of leukemia (V16 6) (Z80 6)  Sister    · Family history of Malignant neoplasm of ovary, unspecified laterality    Social History    · Denied: History of Alcohol Use (History)   · Never a smoker  The social history was reviewed and updated today  Current Meds   1  Aspirin 325 MG Oral Tablet; TAKE 1 TABLET DAILY; Therapy: (Recorded:25Mar2014) to Recorded   2  [employer] In [employer]; test once daily; Therapy: 71Szh8893 to (Evaluate:13Xto8381)  Requested for: 54Lsc2068; Last   Rx:95Ejy2345 Ordered   3  Rosa Maria Microlet Lancets Miscellaneous; TEST ONCE DAILY; Therapy: 55Myo2930 to (Evaluate:89Zto0893)  Requested for: [phone]; Last   Rx:22Hzk2440 Ordered   4  Clopidogrel Bisulfate 75 MG Oral Tablet; TAKE 1 TABLET DAILY  Requested for:   80KWE1972; Last Rx:11Aug2015 Ordered   5  Gabapentin 100 MG Oral Capsule; TAKE 1 CAPSULE TWICE DAILY takes with   gabapentin 400mg qid  Requested for: 11Aug2015; Last Rx:31Rbv2198 Ordered   6  Gabapentin 400 MG Oral Capsule; TAKE 1 CAPSULE 4 TIMES DAILY  Requested for:   70QGJ5258; Last Rx:11Aug2015 Ordered   7  Glimepiride 2 MG Oral Tablet; TAKE ONE TABLET BY MOUTH EVERY DAY AS   DIRECTED; Therapy: 44GPH5058 to (Evaluate:06Fmk8339)  Requested for: 66FRE2962; Last   Rx:29Mar2017 Ordered   8  Metoprolol Tartrate 25 MG Oral Tablet; TAKE 1 TABLET DAILY  Requested for:   81GSK0567; Last Rx:03Vfs4990 Ordered   9  Pravastatin Sodium 40 MG Oral Tablet; TAKE 1 TABLET DAILY  Requested for:   11Aug2015; Last Rx:11Aug2015 Ordered    Allergies    1  No Known Drug Allergies    Immunizations   1 2 3    Influenza  21-Oct-2014 15-Oct-2015 08-Sep-2016    PCV  17-Nov-2015      PPSV  21-Oct-2014      Tdap  07-Oct-2016      Zoster  21-Oct-2014       Vitals  Signs    Temperature: 95 8 F  Heart Rate: 68  Respiration: 16  Systolic: 586  Diastolic: 64  Height: 5 ft 9 in  Weight: 205 lb   BMI Calculated: 30 27  BSA Calculated: 2 09    Physical Exam    Constitutional   General appearance: No acute distress, well appearing and well nourished      Ears, Nose, Mouth, and Throat   External inspection of ears and nose: Normal     Otoscopic examination: Tympanic membranes translucent with normal light reflex  Canals patent without erythema  Hearing: Normal     Oropharynx: Normal with no erythema, edema, exudate or lesions  Neck   Neck: Supple, symmetric, trachea midline, no masses  Pulmonary   Auscultation of lungs: Clear to auscultation  Cardiovascular   Auscultation of heart: Normal rate and rhythm, normal S1 and S2, no murmurs  Results/Data  PHQ-2 Adult Depression Screening 27Jun2017 09:45AM User, Pixsta     Test Name Result Flag Reference   PHQ-2 Adult Depression Score 0     Over the last two weeks, how often have you been bothered by any of the following problems?   Little interest or pleasure in doing things: Not at all - 0  Feeling down, depressed, or hopeless: Not at all - 0   PHQ-2 Adult Depression Screening Negative       Falls Risk Assessment (Dx Z13 89 Screen for Neurologic Disorder) 27Jun2017 09:45AM User, Pixsta     Test Name Result Flag Reference   Falls Risk      No falls in the past year     (1) COMPREHENSIVE METABOLIC PANEL 07QPX5518 95:43UW e(ye)BRAIN     Test Name Result Flag Reference   Glucose, Serum 84 mg/dL  65-99   BUN 17 mg/dL  8-27   Creatinine, Serum 0 90 mg/dL  0 76-1 27   BUN/Creatinine Ratio 19  10-24   Sodium, Serum 141 mmol/L  134-144   Potassium, Serum 5 0 mmol/L  3 5-5 2   Chloride, Serum 101 mmol/L     Carbon Dioxide, Total 24 mmol/L  18-29   Calcium, Serum 9 3 mg/dL  8 6-10 2   Protein, Total, Serum 6 5 g/dL  6 0-8 5   Albumin, Serum 4 2 g/dL  3 6-4 8   Globulin, Total 2 3 g/dL  1 5-4 5   A/G Ratio 1 8  1 2-2 2   Bilirubin, Total 0 6 mg/dL  0 0-1 2   Alkaline Phosphatase, S 46 IU/L     AST (SGOT) 14 IU/L  0-40   ALT (SGPT) 15 IU/L  0-44   eGFR If NonAfricn Am 89 mL/min/1 73  >59   eGFR If Africn Am 103 mL/min/1 73  >59     (1) LIPID PANEL FASTING W DIRECT LDL REFLEX 22Jun2017 07:38AM e(ye)BRAIN     Test Name Result Flag Reference Cholesterol, Total 152 mg/dL  100-199   Triglycerides 67 mg/dL  0-149   HDL Cholesterol 51 mg/dL  >39   LDL Cholesterol Calc 88 mg/dL  0-99     (1) HEMOGLOBIN A1C 22Jun2017 07:38AM Mitch Marin     Test Name Result Flag Reference   Hemoglobin A1c 6 2 % H 4 8-5 6   Pre-diabetes: 5 7 - 6 4           Diabetes: >6 4           Glycemic control for adults with diabetes: <7 0     (LC) HCV Antibody 22Jun2017 07:38AM Mitch Marin     Test Name Result Flag Reference   Hep C Virus Ab <0 1 s/co ratio  0 0-0 9   Negative:     < 0 8                                              Indeterminate: 0 8 - 0 9                                                   Positive:     > 0 9                  The CDC recommends that a positive HCV antibody result                  be followed up with a HCV Nucleic Acid Amplification                  test (684381)  Health Management  Diabetes mellitus with neuropathy   *VB - Eye Exam; every 1 year; Last 47QNI9062; Next Due: 72GNC4991;  Active    Future Appointments    Date/Time Provider Specialty Site   08/08/2017 10:00 AM Keke Miller DPM Podiatry JEAN-PIERRE AMARO DPM    09/27/2017 09:45 AM Yahaira Koehler, [address] [place] [address]   Electronically signed by : Veronique Lopez DO; Jun 27 2017 10:29AM EST                       (Author)

## 2018-01-11 NOTE — RESULT NOTES
Verified Results  (LC) Hgb A1c with eAG Estimation 57Pea3700 07:47AM Teresa Goods     Test Name Result Flag Reference   Hemoglobin A1c 6 3 % H 4 8-5 6   Pre-diabetes: 5 7 - 6 4           Diabetes: >6 4           Glycemic control for adults with diabetes: <7 0   Estim  Avg Glu (eAG) 134 mg/dL       (1) COMPREHENSIVE METABOLIC PANEL 02JBP2926 90:07SG [facility]     Test Name Result Flag Reference   Glucose, Serum 82 mg/dL  65-99   BUN 17 mg/dL  8-27   Creatinine, Serum 0 96 mg/dL  0 76-1 27   eGFR If NonAfricn Am 83 mL/min/1 73  >59   eGFR If Africn Am 95 mL/min/1 73  >59   BUN/Creatinine Ratio 18  10-22   Sodium, Serum 142 mmol/L  134-144   Potassium, Serum 5 2 mmol/L  3 5-5 2   Chloride, Serum 100 mmol/L     Carbon Dioxide, Total 27 mmol/L  18-29   Calcium, Serum 9 7 mg/dL  8 6-10 2   Protein, Total, Serum 6 8 g/dL  6 0-8 5   Albumin, Serum 4 7 g/dL  3 6-4 8   Globulin, Total 2 1 g/dL  1 5-4 5   A/G Ratio 2 2  1 1-2 5   Bilirubin, Total 0 5 mg/dL  0 0-1 2   Alkaline Phosphatase, S 50 IU/L     AST (SGOT) 17 IU/L  0-40   ALT (SGPT) 19 IU/L  0-44       Discussion/Summary   BLOOD TESTS NORMAL   EXCELLENT CONTROL OF DIABETES

## 2018-01-11 NOTE — RESULT NOTES
Discussion/Summary   Appointment 6/27/17   Please print and put in my folder  Dr Tri Jennings      Verified Results  (1) COMPREHENSIVE METABOLIC PANEL [id number] 90:89GU Anupama      Test Name Result Flag Reference   Glucose, Serum 84 mg/dL  65-99   BUN 17 mg/dL  8-27   Creatinine, Serum 0 90 mg/dL  0 76-1 27   BUN/Creatinine Ratio 19  10-24   Sodium, Serum 141 mmol/L  134-144   Potassium, Serum 5 0 mmol/L  3 5-5 2   Chloride, Serum 101 mmol/L     Carbon Dioxide, Total 24 mmol/L  18-29   Calcium, Serum 9 3 mg/dL  8 6-10 2   Protein, Total, Serum 6 5 g/dL  6 0-8 5   Albumin, Serum 4 2 g/dL  3 6-4 8   Globulin, Total 2 3 g/dL  1 5-4 5   A/G Ratio 1 8  1 2-2 2   Bilirubin, Total 0 6 mg/dL  0 0-1 2   Alkaline Phosphatase, S 46 IU/L     AST (SGOT) 14 IU/L  0-40   ALT (SGPT) 15 IU/L  0-44   eGFR If NonAfricn Am 89 mL/min/1 73  >59   eGFR If Africn Am 103 mL/min/1 73  >59     (1) LIPID PANEL FASTING W DIRECT LDL REFLEX [id number] 07:38AM Anupama      Test Name Result Flag Reference   Cholesterol, Total 152 mg/dL  100-199   Triglycerides 67 mg/dL  0-149   HDL Cholesterol 51 mg/dL  >39   LDL Cholesterol Calc 88 mg/dL  0-99     (1) HEMOGLOBIN A1C [id number] 07:38AM Anupama      Test Name Result Flag Reference   Hemoglobin A1c 6 2 % H 4 8-5 6   Pre-diabetes: 5 7 - 6 4           Diabetes: >6 4           Glycemic control for adults with diabetes: <7 0     [facility]) Cardiovascular Risk Assessment [id number] 07:38AM Anupama      Test Name Result Flag Reference   Interpretation Note     Supplement report is available  PDF Image          ([id number]) HCV Antibody [id number] 07:38AM Anupama      Test Name Result Flag Reference   Hep C Virus Ab <0 1 s/co ratio  0 0-0 9   Negative:     < 0 8                                              Indeterminate: 0 8 - 0 9                                                   Positive:     > 0 9                  The CDC recommends that a positive HCV antibody result                  be followed up with a HCV Nucleic Acid Amplification                  test ([id number])

## 2018-01-11 NOTE — RESULT NOTES
Verified Results  * XR CHEST PA & LATERAL [id number] 09:12AM Adrianne Borges Order Number: [id number]     Test Name Result Flag Reference   XR CHEST PA & LATERAL (Report)     CHEST      INDICATION: Cough for one month  COMPARISON: 2/29/2012     VIEWS: Frontal and lateral projections     IMAGES: 2     FINDINGS:        Cardiomediastinal silhouette appears enlarged  Suspected atelectasis in the left lower lobe  No infiltrates  No evidence of heart failure  Elevation of the right hemidiaphragm  No pleural effusions  Visualized osseous structures appear within normal limits for the patient's age  IMPRESSION:     Suspected atelectasis in the left lower lobe         Workstation performed: [id number]     Signed by:   Charles Georges MD   7/18/17       Plan  Cough    · From  Benzonatate 100 MG Oral Capsule TAKE 1 CAPSULE 3 TIMES DAILY  AS NEEDED To Benzonatate 200 MG Oral Capsule 1 po tid prn cough

## 2018-01-11 NOTE — RESULT NOTES
Verified Results  (LC) Hgb A1c with eAG Estimation [id number] 07:36AM e-volo     Test Name Result Flag Reference   Hemoglobin A1c 6 3 % H 4 8-5 6   Pre-diabetes: 5 7 - 6 4           Diabetes: >6 4           Glycemic control for adults with diabetes: <7 0   Estim   Avg Glu (eAG) 134 mg/dL       (1) COMPREHENSIVE METABOLIC PANEL [id number] 34:60PQ e-volo     Test Name Result Flag Reference   Glucose, Serum 66 mg/dL  65-99   BUN 14 mg/dL  8-27   Creatinine, Serum 0 79 mg/dL  0 76-1 27   eGFR If NonAfricn Am 94 mL/min/1 73  >59   eGFR If Africn Am 109 mL/min/1 73  >59   BUN/Creatinine Ratio 18  10-22   Sodium, Serum 141 mmol/L  136-144   **Effective December 12, 2016 the reference interval**                   for Sodium, Serum will be changing to:                                                             134 - 144   Potassium, Serum 4 9 mmol/L  3 5-5 2   Chloride, Serum 101 mmol/L     **Effective December 12, 2016 the reference interval**                   for Chloride, Serum will be changing to:                                                              96 - 106   Carbon Dioxide, Total 26 mmol/L  18-29   Calcium, Serum 9 1 mg/dL  8 6-10 2   Protein, Total, Serum 6 9 g/dL  6 0-8 5   Albumin, Serum 4 4 g/dL  3 6-4 8   Globulin, Total 2 5 g/dL  1 5-4 5   A/G Ratio 1 8  1 1-2 5   Bilirubin, Total 0 4 mg/dL  0 0-1 2   Alkaline Phosphatase, S 57 IU/L     AST (SGOT) 15 IU/L  0-40   ALT (SGPT) 15 IU/L  0-44     [facility]) CBC/Diff Ambiguous Default [id number] 07:36AM e-volo     Test Name Result Flag Reference   WBC 8 3 x10E3/uL  3 4-10 8   RBC 5 45 x10E6/uL  4 14-5 80   Hemoglobin 16 0 g/dL  12 6-17 7   Hematocrit 48 4 %  37 5-51 0   MCV 89 fL  79-97   MCH 29 4 pg  26 6-33 0   MCHC 33 1 g/dL  31 5-35 7   RDW 14 1 %  12 3-15 4   Platelets 273 S70Y4/TK  150-379   Neutrophils 61 %     Lymphs 28 %     Monocytes 8 %     Eos 2 %     Basos 1 %     Neutrophils (Absolute) 5 1 x10E3/uL  1 4-7 0 Lymphs (Absolute) 2 3 x10E3/uL  0 7-3 1   Monocytes(Absolute) 0 7 x10E3/uL  0 1-0 9   Eos (Absolute) 0 2 x10E3/uL  0 0-0 4   Baso (Absolute) 0 0 x10E3/uL  0 0-0 2   Immature Granulocytes 0 %     Immature Grans (Abs) 0 0 x10E3/uL  0 0-0 1     (LC) Lipid Panel [id number] 07:36AM Chelsie Espinoza     Test Name Result Flag Reference   Cholesterol, Total 150 mg/dL  100-199   Triglycerides 72 mg/dL  0-149   HDL Cholesterol 53 mg/dL  >39   VLDL Cholesterol Douglas 14 mg/dL  5-40   LDL Cholesterol Calc 83 mg/dL  0-99       Discussion/Summary   Diabetes is well-controlled    All blood work is normal

## 2018-01-12 VITALS
SYSTOLIC BLOOD PRESSURE: 124 MMHG | TEMPERATURE: 97 F | BODY MASS INDEX: 30.51 KG/M2 | WEIGHT: 206 LBS | HEIGHT: 69 IN | HEART RATE: 66 BPM | DIASTOLIC BLOOD PRESSURE: 70 MMHG | RESPIRATION RATE: 18 BRPM

## 2018-01-12 VITALS
RESPIRATION RATE: 16 BRPM | BODY MASS INDEX: 30.36 KG/M2 | SYSTOLIC BLOOD PRESSURE: 116 MMHG | HEIGHT: 69 IN | DIASTOLIC BLOOD PRESSURE: 64 MMHG | HEART RATE: 68 BPM | WEIGHT: 205 LBS | TEMPERATURE: 95.8 F

## 2018-01-12 VITALS
BODY MASS INDEX: 30.51 KG/M2 | WEIGHT: 206 LBS | HEIGHT: 69 IN | SYSTOLIC BLOOD PRESSURE: 147 MMHG | DIASTOLIC BLOOD PRESSURE: 75 MMHG

## 2018-01-13 VITALS — HEIGHT: 69 IN | BODY MASS INDEX: 30.51 KG/M2 | WEIGHT: 206 LBS

## 2018-01-14 VITALS
DIASTOLIC BLOOD PRESSURE: 72 MMHG | SYSTOLIC BLOOD PRESSURE: 121 MMHG | RESPIRATION RATE: 16 BRPM | HEIGHT: 69 IN | WEIGHT: 206 LBS | BODY MASS INDEX: 30.51 KG/M2 | HEART RATE: 68 BPM

## 2018-01-14 VITALS — WEIGHT: 209 LBS | HEIGHT: 69 IN | BODY MASS INDEX: 30.96 KG/M2

## 2018-01-14 NOTE — RESULT NOTES
Message   Appointment 3/29/17   Please print and put in my folder  Dr Adrián Neri      Verified Results  (1) PSA (SCREEN) (Dx V76 44 Screen for Prostate Cancer) [id number] 11:00AM Gerda Ricks     Test Name Result Flag Reference   Prostate Specific Ag, Serum 1 3 ng/mL  0 0-4 0   Roche ECLIA methodology  According to the American Urological Association, Serum PSA should  decrease and remain at undetectable levels after radical  prostatectomy  The AUA defines biochemical recurrence as an initial  PSA value 0 2 ng/mL or greater followed by a subsequent confirmatory  PSA value 0 2 ng/mL or greater  Values obtained with different assay methods or kits cannot be used  interchangeably  Results cannot be interpreted as absolute evidence  of the presence or absence of malignant disease  ([facility]) Hemoglobin A1c [id number] 11:00AM aRven Dailey courtesy copy of this report has been sent to  [facility]       Test Name Result Flag Reference   Hemoglobin A1c 6 4 %Hb     Reference Range:  American Diabetes Association (ADA) Guidelines:  <5 7: Decreased risk for diabetes  5 7 - 6 4: Increased risk for diabetes  >6 4: Ongoing Hyperglycemia of any cause  <7 0: Glycemic control for adults with diabetes   Estimated Average Glucose 137 mg/dL       (1) COMPREHENSIVE METABOLIC PANEL [id number] 13:32UM Gerda Ricks     Test Name Result Flag Reference   Glucose, Serum 74 mg/dL  65-99   BUN 12 mg/dL  8-27   Creatinine, Serum 0 80 mg/dL  0 76-1 27   eGFR If NonAfricn Am 94 mL/min/1 73  >59   eGFR If Africn Am 108 mL/min/1 73  >59   BUN/Creatinine Ratio 15  10-22   Sodium, Serum 141 mmol/L  134-144   Potassium, Serum 4 4 mmol/L  3 5-5 2   Chloride, Serum 101 mmol/L     Carbon Dioxide, Total 25 mmol/L  18-29   Calcium, Serum 9 0 mg/dL  8 6-10 2   Protein, Total, Serum 6 7 g/dL  6 0-8 5   Albumin, Serum 4 2 g/dL  3 6-4 8   Globulin, Total 2 5 g/dL  1 5-4 5   A/G Ratio 1 7  1 2-2 2   **Please note reference interval change** Bilirubin, Total 0 4 mg/dL  0 0-1 2   Alkaline Phosphatase, S 55 IU/L     AST (SGOT) 16 IU/L  0-40   ALT (SGPT) 18 IU/L  0-44     (1) MICROALBUMIN CREATININE RATIO, RANDOM URINE [id number] 11:00AM Emily Mcgill     Test Name Result Flag Reference   Creatinine, Urine 47 0 mg/dL  Not Estab  Microalbumin, Urine <3 0 ug/mL  Not Estab     Microalb/Creat Ratio <6 4 mg/g creat  0 0-30 0

## 2018-01-15 VITALS
DIASTOLIC BLOOD PRESSURE: 72 MMHG | HEIGHT: 69 IN | HEART RATE: 68 BPM | WEIGHT: 206 LBS | TEMPERATURE: 99.5 F | BODY MASS INDEX: 30.51 KG/M2 | RESPIRATION RATE: 16 BRPM | SYSTOLIC BLOOD PRESSURE: 120 MMHG

## 2018-01-15 VITALS
DIASTOLIC BLOOD PRESSURE: 73 MMHG | WEIGHT: 206 LBS | BODY MASS INDEX: 30.51 KG/M2 | SYSTOLIC BLOOD PRESSURE: 124 MMHG | HEIGHT: 69 IN

## 2018-01-15 VITALS
DIASTOLIC BLOOD PRESSURE: 72 MMHG | HEIGHT: 69 IN | HEART RATE: 68 BPM | BODY MASS INDEX: 30.55 KG/M2 | SYSTOLIC BLOOD PRESSURE: 121 MMHG | RESPIRATION RATE: 16 BRPM | WEIGHT: 206.25 LBS | TEMPERATURE: 98.2 F

## 2018-01-15 NOTE — RESULT NOTES
Discussion/Summary   Appointment 9/27/17   Please print and put in my folder     Dr Ramana Johnson      Verified Results  (1) HEMOGLOBIN A1C [id number] 07:41AM Jose Carlos Arechiga     Test Name Result Flag Reference   Hemoglobin A1c 6 2 % H 4 8-5 6   Pre-diabetes: 5 7 - 6 4           Diabetes: >6 4           Glycemic control for adults with diabetes: <7 0

## 2018-01-22 VITALS
TEMPERATURE: 97 F | DIASTOLIC BLOOD PRESSURE: 68 MMHG | WEIGHT: 209 LBS | BODY MASS INDEX: 30.96 KG/M2 | HEIGHT: 69 IN | RESPIRATION RATE: 18 BRPM | SYSTOLIC BLOOD PRESSURE: 124 MMHG | HEART RATE: 72 BPM

## 2018-01-22 VITALS
BODY MASS INDEX: 30.68 KG/M2 | RESPIRATION RATE: 16 BRPM | HEIGHT: 69 IN | HEART RATE: 68 BPM | TEMPERATURE: 97.8 F | WEIGHT: 207.13 LBS | SYSTOLIC BLOOD PRESSURE: 126 MMHG | DIASTOLIC BLOOD PRESSURE: 74 MMHG

## 2018-01-23 VITALS — RESPIRATION RATE: 16 BRPM | HEIGHT: 69 IN | BODY MASS INDEX: 30.66 KG/M2 | HEART RATE: 60 BPM | WEIGHT: 207 LBS

## 2018-01-23 NOTE — RESULT NOTES
Discussion/Summary   Appointment 12/22/17   Please print and put in my folder  Dr Ann Marie Bauer      Verified Results  (1) COMPREHENSIVE METABOLIC PANEL [id number] 92:24TP [facility] [employer]     Test Name Result Flag Reference   Glucose, Serum 93 mg/dL  65-99   BUN 14 mg/dL  8-27   Creatinine, Serum 0 97 mg/dL  0 76-1 27   BUN/Creatinine Ratio 14  10-24   Sodium, Serum 142 mmol/L  134-144   Potassium, Serum 4 9 mmol/L  3 5-5 2   Chloride, Serum 101 mmol/L     Carbon Dioxide, Total 27 mmol/L  18-29   Calcium, Serum 9 7 mg/dL  8 6-10 2   Protein, Total, Serum 7 2 g/dL  6 0-8 5   Albumin, Serum 4 5 g/dL  3 6-4 8   Globulin, Total 2 7 g/dL  1 5-4 5   A/G Ratio 1 7  1 2-2 2   Bilirubin, Total 0 6 mg/dL  0 0-1 2   Alkaline Phosphatase, S 50 IU/L     AST (SGOT) 14 IU/L  0-40   ALT (SGPT) 18 IU/L  0-44   eGFR If NonAfricn Am 81 mL/min/1 73  >59   eGFR If Africn Am 94 mL/min/1 73  >59     (1) LIPID PANEL FASTING W DIRECT LDL REFLEX [id number] 07:45AM [facility]     Test Name Result Flag Reference   Cholesterol, Total 153 mg/dL  100-199   Triglycerides 110 mg/dL  0-149   HDL Cholesterol 53 mg/dL  >39   LDL Cholesterol Calc 78 mg/dL  0-99     (1) HEMOGLOBIN A1C [id number] 07:45AM [facility] [employer] [facility] [employer]     Test Name Result Flag Reference   Hemoglobin A1c 6 2 % H 4 8-5 6   Pre-diabetes: 5 7 - 6 4           Diabetes: >6 4           Glycemic control for adults with diabetes: <7 0     [facility]) Cardiovascular Risk Assessment [id number] 07:45AM [address] [place]     Test Name Result Flag Reference   Interpretation Note     Supplemental report is available  PDF Image

## 2018-02-24 PROBLEM — N52.9 IMPOTENCE: Status: ACTIVE | Noted: 2017-07-13

## 2018-02-24 PROBLEM — I21.9 MYOCARDIAL INFARCTION (HCC): Status: ACTIVE | Noted: 2017-03-29

## 2018-02-24 PROBLEM — E11.42 DIABETES MELLITUS WITH POLYNEUROPATHY (HCC): Status: ACTIVE | Noted: 2017-08-08

## 2018-02-24 RX ORDER — GABAPENTIN 100 MG/1
1 CAPSULE ORAL 2 TIMES DAILY
COMMUNITY
End: 2018-08-27 | Stop reason: SDUPTHER

## 2018-03-18 LAB
ALBUMIN SERPL-MCNC: 4.5 G/DL (ref 3.6–4.8)
ALBUMIN/CREAT UR: 4.9 MG/G CREAT (ref 0–30)
ALBUMIN/GLOB SERPL: 1.7 {RATIO} (ref 1.2–2.2)
ALP SERPL-CCNC: 78 IU/L (ref 39–117)
ALT SERPL-CCNC: 27 IU/L (ref 0–44)
AST SERPL-CCNC: 20 IU/L (ref 0–40)
BILIRUB SERPL-MCNC: 0.5 MG/DL (ref 0–1.2)
BUN SERPL-MCNC: 12 MG/DL (ref 8–27)
BUN/CREAT SERPL: 13 (ref 10–24)
CALCIUM SERPL-MCNC: 9.2 MG/DL (ref 8.6–10.2)
CHLORIDE SERPL-SCNC: 97 MMOL/L (ref 96–106)
CO2 SERPL-SCNC: 27 MMOL/L (ref 18–29)
CREAT SERPL-MCNC: 0.94 MG/DL (ref 0.76–1.27)
CREAT UR-MCNC: 79 MG/DL
GLOBULIN SER-MCNC: 2.7 G/DL (ref 1.5–4.5)
GLUCOSE SERPL-MCNC: 101 MG/DL (ref 65–99)
HBA1C MFR BLD: 6.3 % (ref 4.8–5.6)
MICROALBUMIN UR-MCNC: 3.9 UG/ML
POTASSIUM SERPL-SCNC: 4.4 MMOL/L (ref 3.5–5.2)
PROT SERPL-MCNC: 7.2 G/DL (ref 6–8.5)
SL AMB EGFR AFRICAN AMERICAN: 97 ML/MIN/1.73
SL AMB EGFR NON AFRICAN AMERICAN: 84 ML/MIN/1.73
SODIUM SERPL-SCNC: 139 MMOL/L (ref 134–144)

## 2018-03-20 ENCOUNTER — OFFICE VISIT (OUTPATIENT)
Dept: PODIATRY | Facility: CLINIC | Age: 68
End: 2018-03-20
Payer: MEDICARE

## 2018-03-20 ENCOUNTER — OFFICE VISIT (OUTPATIENT)
Dept: FAMILY MEDICINE CLINIC | Facility: CLINIC | Age: 68
End: 2018-03-20
Payer: MEDICARE

## 2018-03-20 VITALS
SYSTOLIC BLOOD PRESSURE: 139 MMHG | BODY MASS INDEX: 29.49 KG/M2 | HEIGHT: 70 IN | WEIGHT: 206 LBS | DIASTOLIC BLOOD PRESSURE: 91 MMHG

## 2018-03-20 VITALS
TEMPERATURE: 95.6 F | HEIGHT: 70 IN | WEIGHT: 206 LBS | BODY MASS INDEX: 29.49 KG/M2 | SYSTOLIC BLOOD PRESSURE: 126 MMHG | HEART RATE: 68 BPM | RESPIRATION RATE: 16 BRPM | DIASTOLIC BLOOD PRESSURE: 76 MMHG

## 2018-03-20 DIAGNOSIS — R20.2 TINGLING: ICD-10-CM

## 2018-03-20 DIAGNOSIS — E78.2 MIXED HYPERLIPIDEMIA: ICD-10-CM

## 2018-03-20 DIAGNOSIS — I10 BENIGN ESSENTIAL HYPERTENSION: ICD-10-CM

## 2018-03-20 DIAGNOSIS — M79.671 FOOT PAIN, BILATERAL: ICD-10-CM

## 2018-03-20 DIAGNOSIS — L84 CORNS: ICD-10-CM

## 2018-03-20 DIAGNOSIS — I70.209 ARTERIOSCLEROSIS OF ARTERIES OF EXTREMITIES (HCC): ICD-10-CM

## 2018-03-20 DIAGNOSIS — B35.1 ONYCHOMYCOSIS: ICD-10-CM

## 2018-03-20 DIAGNOSIS — E11.42 DIABETIC POLYNEUROPATHY ASSOCIATED WITH TYPE 2 DIABETES MELLITUS (HCC): Primary | ICD-10-CM

## 2018-03-20 DIAGNOSIS — M79.672 FOOT PAIN, BILATERAL: ICD-10-CM

## 2018-03-20 DIAGNOSIS — Z12.5 PROSTATE CANCER SCREENING: ICD-10-CM

## 2018-03-20 PROCEDURE — 99214 OFFICE O/P EST MOD 30 MIN: CPT | Performed by: FAMILY MEDICINE

## 2018-03-20 PROCEDURE — 11056 PARNG/CUTG B9 HYPRKR LES 2-4: CPT | Performed by: PODIATRIST

## 2018-03-20 PROCEDURE — 99212 OFFICE O/P EST SF 10 MIN: CPT | Performed by: PODIATRIST

## 2018-03-20 NOTE — PROGRESS NOTES
Assessment/Plan:    Problem List Items Addressed This Visit     Benign essential hypertension     Well controlled         Relevant Orders    CBC    Comprehensive metabolic panel    Diabetic polyneuropathy associated with type 2 diabetes mellitus ([facility] [address] ) - Primary     Very well controlled, A1c is 6 3             Relevant Orders    Comprehensive metabolic panel    HEMOGLOBIN A1C W/ EAG ESTIMATION    Mixed hyperlipidemia     Well controlled with pravastatin         Relevant Orders    Comprehensive metabolic panel    Lipid Panel with Direct LDL reflex      Other Visit Diagnoses     Prostate cancer screening        Relevant Orders    PSA          Patient Instructions     Recent Results (from the past 336 hour(s))   Comprehensive metabolic panel    Collection Time: 03/17/18  7:51 AM   Result Value Ref Range    SL AMB GLUCOSE 101 (H) 65 - 99 mg/dL    BUN 12 8 - 27 mg/dL    Creatinine, Serum 0 94 0 76 - 1 27 mg/dL    eGFR Non  84 >59 mL/min/1 73    SL AMB EGFR AFRICAN AMERICAN 97 >59 mL/min/1 73    SL AMB BUN/CREATININE RATIO 13 10 - 24    SL AMB SODIUM 139 134 - 144 mmol/L    SL AMB POTASSIUM 4 4 3 5 - 5 2 mmol/L    SL AMB CHLORIDE 97 96 - 106 mmol/L    SL AMB CARBON DIOXIDE 27 18 - 29 mmol/L    CALCIUM 9 2 8 6 - 10 2 mg/dL    SL AMB PROTEIN, TOTAL 7 2 6 0 - 8 5 g/dL    Serum Albumin 4 5 3 6 - 4 8 g/dL    Globulin, Total 2 7 1 5 - 4 5 g/dL    SL AMB ALBUMIN/GLOBULIN RATIO 1 7 1 2 - 2 2    SL AMB BILIRUBIN, TOTAL 0 5 0 0 - 1 2 mg/dL    Alk Phos Isoenzymes 78 39 - 117 IU/L    SL AMB AST 20 0 - 40 IU/L    SL AMB ALT 27 0 - 44 IU/L   Microalbumin / creatinine urine ratio    Collection Time: 03/17/18  7:51 AM   Result Value Ref Range    Creatinine, Urine 79 0 Not Estab  mg/dL    Microalbum  ,U,Random 3 9 Not Estab  ug/mL    Microalb/Creat Ratio 4 9 0 0 - 30 0 mg/g creat   Hemoglobin A1c    Collection Time: 03/17/18  7:51 AM   Result Value Ref Range    Hemoglobin A1C 6 3 (H) 4 8 - 5 6 %           Return in about 3 months (around 6/20/2018) for Annual Wellness Visit with MARIBEL  Subjective:      Patient ID: Marvin Shipman is a 78 y o  male  Chief Complaint   Patient presents with    Follow-up     review new labs       He has been checking his sugars regularly  He has been helping a lady friend and he is now done  Even his deacon from [employer] told him its about time  Hyperlipidemia   This is a chronic problem  The current episode started more than 1 year ago  The problem is controlled  Recent lipid tests were reviewed and are normal  Exacerbating diseases include diabetes  Pertinent negatives include no chest pain  The current treatment provides moderate improvement of lipids  There are no compliance problems  Hypertension   This is a chronic problem  The current episode started more than 1 year ago  The problem is controlled  Pertinent negatives include no chest pain or headaches  The current treatment provides moderate improvement  There are no compliance problems  The following portions of the patient's history were reviewed and updated as appropriate: allergies, current medications, past family history, past medical history, past social history, past surgical history and problem list     Review of Systems   Cardiovascular: Negative for chest pain  Neurological: Negative for headaches  Current Outpatient Prescriptions   Medication Sig Dispense Refill    aspirin 325 mg tablet Take 325 mg by mouth daily   LUIS FERNANDO MICROLET LANCETS lancets by Does not apply route daily      clopidogrel (PLAVIX) 75 mg tablet Take 75 mg by mouth daily Indications: Heart Attack   gabapentin (NEURONTIN) 100 mg capsule Take 1 capsule by mouth 2 (two) times a day Takes with 400mg gabapentin      gabapentin (NEURONTIN) 400 mg capsule Take 400 mg by mouth 4 (four) times a day   400mg/2x day/  100mg/ 2x day      glimepiride (AMARYL) 2 mg tablet Take 2 mg by mouth every morning before breakfast      Corina Sawyer glucose blood (LUIS FERNANDO CONTOUR NEXT TEST) test strip by In Vitro route daily      metoprolol succinate (TOPROL-XL) 25 mg 24 hr tablet Take 25 mg by mouth daily Indications: High Blood Pressure  12 5 mg am / 12 5 mg pm      pravastatin (PRAVACHOL) 40 mg tablet Take 40 mg by mouth daily  No current facility-administered medications for this visit  Objective:    /76   Pulse 68   Temp (!) 95 6 °F (35 3 °C)   Resp 16   Ht 5' 9 5" (1 765 m)   Wt 93 4 kg (206 lb)   BMI 29 98 kg/m²        Physical Exam   Constitutional: He appears well-developed and well-nourished  HENT:   Head: Normocephalic and atraumatic  Right Ear: External ear normal    Left Ear: External ear normal    Mouth/Throat: Oropharynx is clear and moist    Cardiovascular: Normal rate, regular rhythm and normal heart sounds  No murmur heard  Pulmonary/Chest: Effort normal and breath sounds normal  No respiratory distress  He has no wheezes  He has no rales  Musculoskeletal: He exhibits no edema or tenderness  Nursing note and vitals reviewed               Riley Bonilla DO

## 2018-03-20 NOTE — PROGRESS NOTES
Assessment/Plan:  Aseptic debridement of hyperkeratotic lesions fifth met head and Second digit bilateral  Aseptic debridement and planing of nails x10, with nail nipper and nail , no complications    Discussed proper diabetic care  Stressed importance of exercise to improve circulation  Discussed proper foot care daily foot checks  Patient apply urea cream daily  Follow-up 3 months  There are no diagnoses linked to this encounter  Subjective:      Patient ID: Carlos Matute is a 78 y o  male  Patient says blood sugar has been well controlled A1c was 6 3  Patient does have some burning and the right leg  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  Does have some burning and numbness  Patient has been walking regularly  No cramps in legs no falls or instability  Patient does have painful calluses and thick painful nails  Patient has not noticed any redness or signs of infection  The following portions of the patient's history were reviewed and updated as appropriate: allergies, current medications, past family history, past medical history, past social history, past surgical history and problem list     Review of Systems   Constitutional: Negative  HENT: Negative  Eyes: Negative  Respiratory: Negative  Cardiovascular: Negative  Gastrointestinal: Negative  Endocrine: Negative  Genitourinary: Negative  Musculoskeletal: Negative  Skin: Negative  Allergic/Immunologic: Negative  Neurological: Negative  Hematological: Negative  Psychiatric/Behavioral: Negative  Objective:      /91   Ht 5' 9 5" (1 765 m)   Wt 93 4 kg (206 lb)   BMI 29 98 kg/m²          Physical Exam   Cardiovascular: Pulses are weak pulses  Pulses:       Dorsalis pedis pulses are 1+ on the right side, and 1+ on the left side  Posterior tibial pulses are 0 on the right side, and 0 on the left side  Musculoskeletal:        Feet:    Feet:   Right Foot:   Skin Integrity: Positive for callus and dry skin  Left Foot:   Skin Integrity: Positive for callus and dry skin  Patient's shoes and socks removed  Right Foot/Ankle   Right Foot Inspection  Skin Exam: dry skin, callus and callus                            Sensory   Vibration: absent  Proprioception: intact   Monofilament testing: absent  Vascular  Capillary refills: elevated  The right DP pulse is 1+  The right PT pulse is 0  Left Foot/Ankle  Left Foot Inspection  Skin Exam: dry skin and callus                                         Sensory   Vibration: absent  Proprioception: intact  Monofilament: intact  Vascular  Capillary refills: elevated  The left DP pulse is 1+  The left PT pulse is 0  Assign Risk Category:  Deformity present; Loss of protective sensation; Weak pulses       Risk: 2       Constitutional: no acute distress, well appearing and well nourished  Orthopedic/Biomechanical: ingrown nails  Skin: Left hallux, decreased erythema decreased exudate, resolving, infection  Neurologic: decreased vibration sensation  Psychiatric: oriented to person, place, and time  Left Foot: Appearance: Normal except as noted: excessive pronation-- and-- pes planus  Great toe deformities include a bunion  Second toe deformities include hammer toe  Third toe deformities include hammer toe  Forth toe deformities include hammer toe  Fifth toe deformities include hammer toe  Evaluation of the great toe nail demonstrates paronychia-- and-- an ingrown nail  Evaluation of the fourth toe nail demonstrates subungual hematoma  Sunday Heckler +1 pitting edema bilateral feet and ankles negative calf tenderness negative Homans sign  ROM: Full  Special Tests: Negative erythema, mild edema, no signs of infection no open wounds  Right Foot: Appearance: Normal except as noted: excessive pronation-- and-- pes planus   Second toe deformities include hammer toe  Third toe deformities include hammer toe  Forth toe deformities include hammer toe  Fifth toe deformities include hammer toe  Evaluation of the great toe nail demonstrates an ingrown nail-- and-- subungual hematoma, but-- no paronychia  No erythema no signs of infection mild ingrown  Hyperkeratotic lesions distal second digit bilateral first met head bilateral negative ulceration negative erythema negative exudate  ROM: Full  Special Tests: Mild xerosis  Small fissures posterior heel bilateral     Left Ankle: ROM: limited ROM in all planes Motor: diffuse weakness  Right Ankle: ROM: limited ROM in all planes Motor: diffuse weakness  Neurological Exam: performed  Light touch was intact bilaterally  Vibratory sensation was decreased in the right ankle-- and-- decreased in the left first metatarsophalangeal joint  Response to monofilament test was intact bilaterally  Vascular Exam: performed Dorsalis pedis pulses were 2/4 bilaterally  Posterior tibial pulses were 1/4 bilaterally  Capillary refill time was between 1-3 seconds bilaterally  Toenails: All of the toenails were elongated,-- hypertrophied,-- tender-- and-- Mycotic with dystrophy  Diminished tactile sensation with monofilament testing throughout the right foot  Diminished tactile sensation with monofilament testing throughout the left foot  Capillary refills findings on the right were delayed in the toes  Pulses:      0 in the posterior tibialis on the right      1+ in the dorsalis pedis on the right  Capillary refills findings on the left were delayed in the toes  Pulses:      0 in the posterior tibialis on the left      1+ in the dorsalis pedis on the left  Assign Risk Category: 2: Loss of protective sensation with or without weakness, deformity, callus, pre-ulcer, or history of ulceration  High risk      Hyperkeratosis: present on both second toes,-- present on the right second toe,-- present on both first sub metatarsals-- and-- present on both fifth sub metatarsals  Shoe Gear Evaluation: performed ()  Right Foot: width: e-- and-- length: 11  Left Foot: width: e-- and-- length: 11  Recommendation(s): extra depth diabetic shoes-- and-- SAS style  Preulcerative lesions on distal aspect of the 2nd digit right worse than left  Skin is hairless and atrophic  Negative calf tenderness mild edema  No signs of infection

## 2018-03-20 NOTE — PATIENT INSTRUCTIONS
Recent Results (from the past 336 hour(s))   Comprehensive metabolic panel    Collection Time: 03/17/18  7:51 AM   Result Value Ref Range    SL AMB GLUCOSE 101 (H) 65 - 99 mg/dL    BUN 12 8 - 27 mg/dL    Creatinine, Serum 0 94 0 76 - 1 27 mg/dL    eGFR Non  84 >59 mL/min/1 73    SL AMB EGFR AFRICAN AMERICAN 97 >59 mL/min/1 73    SL AMB BUN/CREATININE RATIO 13 10 - 24    SL AMB SODIUM 139 134 - 144 mmol/L    SL AMB POTASSIUM 4 4 3 5 - 5 2 mmol/L    SL AMB CHLORIDE 97 96 - 106 mmol/L    SL AMB CARBON DIOXIDE 27 18 - 29 mmol/L    CALCIUM 9 2 8 6 - 10 2 mg/dL    SL AMB PROTEIN, TOTAL 7 2 6 0 - 8 5 g/dL    Serum Albumin 4 5 3 6 - 4 8 g/dL    Globulin, Total 2 7 1 5 - 4 5 g/dL    SL AMB ALBUMIN/GLOBULIN RATIO 1 7 1 2 - 2 2    SL AMB BILIRUBIN, TOTAL 0 5 0 0 - 1 2 mg/dL    Alk Phos Isoenzymes 78 39 - 117 IU/L    SL AMB AST 20 0 - 40 IU/L    SL AMB ALT 27 0 - 44 IU/L   Microalbumin / creatinine urine ratio    Collection Time: 03/17/18  7:51 AM   Result Value Ref Range    Creatinine, Urine 79 0 Not Estab  mg/dL    Microalbum  ,U,Random 3 9 Not Estab  ug/mL    Microalb/Creat Ratio 4 9 0 0 - 30 0 mg/g creat   Hemoglobin A1c    Collection Time: 03/17/18  7:51 AM   Result Value Ref Range    Hemoglobin A1C 6 3 (H) 4 8 - 5 6 %

## 2018-05-17 DIAGNOSIS — E11.42 DIABETIC POLYNEUROPATHY ASSOCIATED WITH TYPE 2 DIABETES MELLITUS (HCC): Primary | ICD-10-CM

## 2018-05-24 DIAGNOSIS — E11.42 DIABETIC POLYNEUROPATHY ASSOCIATED WITH TYPE 2 DIABETES MELLITUS (HCC): Primary | ICD-10-CM

## 2018-05-24 RX ORDER — GLIMEPIRIDE 2 MG/1
TABLET ORAL
Qty: 90 TABLET | Refills: 1 | Status: SHIPPED | OUTPATIENT
Start: 2018-05-24 | End: 2018-11-20 | Stop reason: SDUPTHER

## 2018-05-29 ENCOUNTER — OFFICE VISIT (OUTPATIENT)
Dept: PODIATRY | Facility: CLINIC | Age: 68
End: 2018-05-29
Payer: MEDICARE

## 2018-05-29 VITALS — HEIGHT: 70 IN | RESPIRATION RATE: 16 BRPM | BODY MASS INDEX: 29.49 KG/M2 | HEART RATE: 78 BPM | WEIGHT: 206 LBS

## 2018-05-29 DIAGNOSIS — R20.2 PARESTHESIA: ICD-10-CM

## 2018-05-29 DIAGNOSIS — I70.209 ATHEROSCLEROSIS OF ARTERIES OF EXTREMITIES (HCC): ICD-10-CM

## 2018-05-29 DIAGNOSIS — M79.671 PAIN IN BOTH FEET: ICD-10-CM

## 2018-05-29 DIAGNOSIS — B35.1 ONYCHOMYCOSIS: ICD-10-CM

## 2018-05-29 DIAGNOSIS — M79.672 PAIN IN BOTH FEET: ICD-10-CM

## 2018-05-29 DIAGNOSIS — E11.42 DIABETIC POLYNEUROPATHY ASSOCIATED WITH TYPE 2 DIABETES MELLITUS (HCC): ICD-10-CM

## 2018-05-29 DIAGNOSIS — G60.9 IDIOPATHIC PERIPHERAL NEUROPATHY: Primary | ICD-10-CM

## 2018-05-29 PROCEDURE — 11721 DEBRIDE NAIL 6 OR MORE: CPT | Performed by: PODIATRIST

## 2018-05-29 PROCEDURE — 99212 OFFICE O/P EST SF 10 MIN: CPT | Performed by: PODIATRIST

## 2018-05-29 NOTE — PROGRESS NOTES
Assessment/Plan:    Aseptic debridement and planning of nails x10 and manually and mechanically    Discussed oral and topical treatments for neuropathy  Discussed side effects of medications  Discussed proper diabetic foot care discussed risks of ulceration infection  Follow-up 2 months     Diagnoses and all orders for this visit:    Idiopathic peripheral neuropathy    Onychomycosis    Diabetic polyneuropathy associated with type 2 diabetes mellitus ([facility] )    Pain in both feet    Paresthesia    Atherosclerosis of arteries of extremities (HCC)          Subjective:      Patient ID: Dex Davenport is a 80 y o  male  Patient chief complaint of thick painful that hurt when walking wearing shoes  Patient says blood sugar has been well controlled  Patient says blood sugar has been well controlled  Patient has been having tingling and burning in feet worse at night    Patient has been taking gabapentin says it has helped what but still having significant burning and shooting and the        Past Medical History:   Diagnosis Date    Acquired ankle/foot deformity     last assessed: 05/30/2017    Acute myocardial infarction [facility])     last assessed: 12/03/2013    Anemia     last assessed: 12/03/2013    Anxiety     last assessed: 12/03/2013    Cardiomyopathy ([place] [address] )     last assessed: 12/03/2013    Congestive heart failure (CHF) ([place] [address] )     onset: 02/29/2012    Coronary artery disease     Diabetes mellitus ([place] [address] )     Dysesthesia     last assessed: 09/10/2014    Exposure to hepatitis     last assessed: 11/15/2016    Foot slap     last assessed: 03/17/2016    Hypertension     Myocardial infarction [facility])     Palsy of left sciatic nerve     last assessed: 03/17/2016    Subconjunctival hemorrhage     last assessed: 03/03/2014    Xerosis cutis     last assessed: 10/25/2016       Past Surgical History:   Procedure Laterality Date    ANGIOPLASTY      2 coronary stents    CHOLECYSTECTOMY      COLONOSCOPY N/A 3/17/2016 Procedure: COLONOSCOPY;  Surgeon: Pushpa Brooke MD;  Location: [facility] GI LAB; Service:    [address]      right inguinal        No Known Allergies      Current Outpatient Prescriptions:     aspirin 325 mg tablet, Take 325 mg by mouth daily  , Disp: , Rfl:     LUIS FERNANDO CONTOUR NEXT TEST test strip, USE TO TEST BLOOD GLUCOSE ONCE DAILY, Disp: 100 each, Rfl: 3    LUIS FERNANDO MICROLET LANCETS lancets, by Does not apply route daily, Disp: , Rfl:     clopidogrel (PLAVIX) 75 mg tablet, Take 75 mg by mouth daily Indications: Heart Attack  , Disp: , Rfl:     gabapentin (NEURONTIN) 100 mg capsule, Take 1 capsule by mouth 2 (two) times a day Takes with 400mg gabapentin, Disp: , Rfl:     gabapentin (NEURONTIN) 400 mg capsule, Take 400 mg by mouth 4 (four) times a day  400mg/2x day/  100mg/ 2x day, Disp: , Rfl:     glimepiride (AMARYL) 2 mg tablet, TAKE ONE TABLET BY MOUTH EVERY DAY AS DIRECTED, Disp: 90 tablet, Rfl: 1    metoprolol succinate (TOPROL-XL) 25 mg 24 hr tablet, Take 25 mg by mouth daily Indications: High Blood Pressure  12 5 mg am / 12 5 mg pm, Disp: , Rfl:     pravastatin (PRAVACHOL) 40 mg tablet, Take 40 mg by mouth daily  , Disp: , Rfl:     Patient Active Problem List   Diagnosis    Benign essential hypertension    Diabetic polyneuropathy associated with type 2 diabetes mellitus ([facility] )    Mixed hyperlipidemia    Impotence    Myocardial infarction ([facility] )    Nerve palsy    Onychomycosis    Peripheral neuropathy    Reflex sympathetic dystrophy    Tabes dorsalis    Atherosclerosis of arteries of extremities (HCC)       Review of Systems   Constitutional: Negative  HENT: Negative  Eyes: Negative  Respiratory: Negative  Cardiovascular: Negative  Gastrointestinal: Negative  Endocrine: Negative  Genitourinary: Negative  Musculoskeletal: Negative  Skin: Negative  Allergic/Immunologic: Negative  Neurological: Negative  Hematological: Negative      Psychiatric/Behavioral: Negative  Objective:      Pulse 78   Resp 16   Ht 5' 9 5" (1 765 m)   Wt 93 4 kg (206 lb)   BMI 29 98 kg/m²          Physical Exam      Diabetic Foot Exam     Constitutional: no acute distress, well appearing and well nourished       Orthopedic/Biomechanical: ingrown nails       Skin: Left hallux, decreased erythema decreased exudate, resolving, infection       Neurologic: decreased vibration sensation       Psychiatric: oriented to person, place, and time     Left Foot: Appearance: Normal except as noted: excessive pronation-- and-- pes planus  Great toe deformities include a bunion  Second toe deformities include hammer toe  Third toe deformities include hammer toe  Forth toe deformities include hammer toe  Fifth toe deformities include hammer toe  Evaluation of the great toe nail demonstrates paronychia-- and-- an ingrown nail  Evaluation of the fourth toe nail demonstrates subungual hematoma  Gabbi Bruno +1 pitting edema bilateral feet and ankles negative calf tenderness negative Homans sign  ROM: Full  Special Tests: Negative erythema, mild edema, no signs of infection no open wounds     Right Foot: Appearance: Normal except as noted: excessive pronation-- and-- pes planus  Second toe deformities include hammer toe  Third toe deformities include hammer toe  Forth toe deformities include hammer toe  Fifth toe deformities include hammer toe  Evaluation of the great toe nail demonstrates an ingrown nail-- and-- subungual hematoma, but-- no paronychia  No erythema no signs of infection mild ingrown  Hyperkeratotic lesions distal second digit bilateral first met head bilateral negative ulceration negative erythema negative exudate  ROM: Full  Special Tests: Mild xerosis  Small fissures posterior heel bilateral     Left Ankle: ROM: limited ROM in all planes Motor: diffuse weakness     Right Ankle: ROM: limited ROM in all planes Motor: diffuse weakness     Neurological Exam: performed   Light touch was intact bilaterally  Vibratory sensation was decreased in the right ankle-- and-- decreased in the left first metatarsophalangeal joint  Response to monofilament test was intact bilaterally     Vascular Exam: performed Dorsalis pedis pulses were 2/4 bilaterally  Posterior tibial pulses were 1/4 bilaterally  Capillary refill time was between 1-3 seconds bilaterally     Toenails: All of the toenails were elongated,-- hypertrophied,-- tender-- and-- Mycotic with dystrophy     Diminished tactile sensation with monofilament testing throughout the right foot  Diminished tactile sensation with monofilament testing throughout the left foot  Capillary refills findings on the right were delayed in the toes       Pulses:      0 in the posterior tibialis on the right      1+ in the dorsalis pedis on the right       Capillary refills findings on the left were delayed in the toes       Pulses:      0 in the posterior tibialis on the left      1+ in the dorsalis pedis on the left       Assign Risk Category: 2: Loss of protective sensation with or without weakness, deformity, callus, pre-ulcer, or history of ulceration  High risk     Hyperkeratosis: present on both second toes,-- present on the right second toe,-- present on both first sub metatarsals-- and-- present on both fifth sub metatarsals     Shoe Gear Evaluation: performed ()  Right Foot: width: e-- and-- length: 11  Left Foot: width: e-- and-- length: 11  Recommendation(s): extra depth diabetic shoes-- and-- SAS style  Preulcerative lesions on distal aspect of the 2nd digit right worse than left  Skin is hairless and atrophic  Negative calf tenderness mild edema  No signs of infection    Negative Tinel sign tarsal tunnel bilateral  Muscle strength 5/5 all groups bilateral feet and ankles  Significantly reduced sensation bilateral   Negative erythema no signs of infection

## 2018-06-16 LAB
ALBUMIN SERPL-MCNC: 4.5 G/DL (ref 3.6–4.8)
ALBUMIN/GLOB SERPL: 1.8 {RATIO} (ref 1.2–2.2)
ALP SERPL-CCNC: 58 IU/L (ref 39–117)
ALT SERPL-CCNC: 14 IU/L (ref 0–44)
AMBIG ABBREV DEFAULT: NORMAL
AST SERPL-CCNC: 13 IU/L (ref 0–40)
BASOPHILS # BLD AUTO: 0.1 X10E3/UL (ref 0–0.2)
BASOPHILS NFR BLD AUTO: 1 %
BILIRUB SERPL-MCNC: 0.6 MG/DL (ref 0–1.2)
BUN SERPL-MCNC: 16 MG/DL (ref 8–27)
BUN/CREAT SERPL: 16 (ref 10–24)
CALCIUM SERPL-MCNC: 9.4 MG/DL (ref 8.6–10.2)
CHLORIDE SERPL-SCNC: 98 MMOL/L (ref 96–106)
CHOLEST SERPL-MCNC: 152 MG/DL (ref 100–199)
CO2 SERPL-SCNC: 27 MMOL/L (ref 20–29)
CREAT SERPL-MCNC: 0.97 MG/DL (ref 0.76–1.27)
EOSINOPHIL # BLD AUTO: 0.3 X10E3/UL (ref 0–0.4)
EOSINOPHIL NFR BLD AUTO: 3 %
ERYTHROCYTE [DISTWIDTH] IN BLOOD BY AUTOMATED COUNT: 14.3 % (ref 12.3–15.4)
EST. AVERAGE GLUCOSE BLD GHB EST-MCNC: 131 MG/DL
GLOBULIN SER-MCNC: 2.5 G/DL (ref 1.5–4.5)
GLUCOSE SERPL-MCNC: 86 MG/DL (ref 65–99)
HBA1C MFR BLD: 6.2 % (ref 4.8–5.6)
HCT VFR BLD AUTO: 45.1 % (ref 37.5–51)
HDLC SERPL-MCNC: 50 MG/DL
HGB BLD-MCNC: 15.3 G/DL (ref 13–17.7)
IMM GRANULOCYTES # BLD: 0 X10E3/UL (ref 0–0.1)
IMM GRANULOCYTES NFR BLD: 0 %
LDLC SERPL CALC-MCNC: 83 MG/DL (ref 0–99)
LYMPHOCYTES # BLD AUTO: 2.1 X10E3/UL (ref 0.7–3.1)
LYMPHOCYTES NFR BLD AUTO: 24 %
MCH RBC QN AUTO: 29.5 PG (ref 26.6–33)
MCHC RBC AUTO-ENTMCNC: 33.9 G/DL (ref 31.5–35.7)
MCV RBC AUTO: 87 FL (ref 79–97)
MICRODELETION SYND BLD/T FISH: NORMAL
MONOCYTES # BLD AUTO: 0.7 X10E3/UL (ref 0.1–0.9)
MONOCYTES NFR BLD AUTO: 8 %
NEUTROPHILS # BLD AUTO: 5.5 X10E3/UL (ref 1.4–7)
NEUTROPHILS NFR BLD AUTO: 64 %
PLATELET # BLD AUTO: 202 X10E3/UL (ref 150–379)
POTASSIUM SERPL-SCNC: 4.5 MMOL/L (ref 3.5–5.2)
PROT SERPL-MCNC: 7 G/DL (ref 6–8.5)
PSA SERPL-MCNC: 1.5 NG/ML (ref 0–4)
RBC # BLD AUTO: 5.19 X10E6/UL (ref 4.14–5.8)
SL AMB EGFR AFRICAN AMERICAN: 93 ML/MIN/1.73
SL AMB EGFR NON AFRICAN AMERICAN: 80 ML/MIN/1.73
SODIUM SERPL-SCNC: 141 MMOL/L (ref 134–144)
TRIGL SERPL-MCNC: 97 MG/DL (ref 0–149)
WBC # BLD AUTO: 8.7 X10E3/UL (ref 3.4–10.8)

## 2018-06-18 NOTE — PROGRESS NOTES
Medicare Annual Wellness Visit  [address] [facility]  NAME: Alva Phan SEX: male : 1950      ASSESSMENT/PLAN:  Problem List Items Addressed This Visit     Diabetic polyneuropathy associated with type 2 diabetes mellitus ([address] )    Relevant Orders    Comprehensive metabolic panel    Hemoglobin A1C      Other Visit Diagnoses     Medicare annual wellness visit, subsequent    -  Primary    Colon cancer screening        Relevant Orders    POCT hemoccult screening (Completed)            MEDICARE WELLNESS COUNSELING/DISCUSSION:    Preventative Screening/counseling    Cardiovascular Screening/counseling: Screening Current    Diabetes Screening/Counseling: Screening Not Indicated - patient is a diabetic     Colorectal Screening/counseling: Screening Current    Abdominal Aortic Aneurysm Screening: Screening Not Indicated     Glaucoma Screening: Screening Current    HIV Screening/Counseling: Screening Not Indicated     Osteoporosis Screening: Screening Not Indicated     Hepatitis C screening: Screening Current    Prostate Screening/counseling (optional): Screening Current    Immunizations    influenza: Influenza Up To Date This Year  Influenza Recommended Annually    pneumococcal: Lifetime Vaccine Complete    Hepatitis B Series: Series Not Indicated    Shingles Vaccination: Shingrix Vaccination completed      Preventive Counseling: Fall Prevention  Nutrition  Care Plan Given      Advanced Directives: Advanced Directives Are Up To Date  His daughter would make his decisions    HISTORY OF PRESENT ILLNESS  He is trying to sell his house  He is trying to stay active  He is walking 1 to 3 miles a day              PATIENT CARE TEAM:  Patient Care Team:  Itzel Bueno DO as PCP - General  MD Jeaneth Hickman DO    PAST MEDICAL HISTORY:  Past Medical History:   Diagnosis Date    Acquired ankle/foot deformity     last assessed: 2017    Acute myocardial infarction ([place] [address] )     last assessed: 12/03/2013    Anemia     last assessed: 12/03/2013    Anxiety     last assessed: 12/03/2013    Cardiomyopathy ([place] [address] )     last assessed: 12/03/2013    Congestive heart failure (CHF) ([place] [address] )     onset: 02/29/2012    Coronary artery disease     Diabetes mellitus ([place] [address] )     Dysesthesia     last assessed: 09/10/2014    Exposure to hepatitis     last assessed: 11/15/2016    Foot slap     last assessed: 03/17/2016    Hypertension     Myocardial infarction [facility])     Palsy of left sciatic nerve     last assessed: 03/17/2016    Subconjunctival hemorrhage     last assessed: 03/03/2014    Xerosis cutis     last assessed: 10/25/2016       PAST SURGICAL HISTORY:  Past Surgical History:   Procedure Laterality Date    ANGIOPLASTY      2 coronary stents    CHOLECYSTECTOMY      COLONOSCOPY N/A 3/17/2016    Procedure: COLONOSCOPY;  Surgeon: Mary Ann Bailey MD;  Location: [facility] GI LAB; Service:    [address] [facility]      right inguinal        PROBLEM LIST:  Patient Active Problem List   Diagnosis    Benign essential hypertension    Diabetic polyneuropathy associated with type 2 diabetes mellitus ([place] [address] )    Mixed hyperlipidemia    Impotence    Myocardial infarction ([place] [address] )    Nerve palsy    Onychomycosis    Peripheral neuropathy    Reflex sympathetic dystrophy    Tabes dorsalis    Atherosclerosis of arteries of extremities (HCC)       FAMILY HISTORY:  Family History   Problem Relation Age of Onset    Heart disease Mother         cardiac disorder    Leukemia Father     Ovarian cancer Sister        SOCIAL HISTORY:  Caden Jackson  reports that he has never smoked  He has never used smokeless tobacco  He reports that he does not drink alcohol or use drugs  ALLERGIES:  No Known Allergies    CURRENT MEDICATIONS:  Current Outpatient Prescriptions   Medication Sig Dispense Refill    aspirin 325 mg tablet Take 325 mg by mouth daily        LUIS FERNANDO CONTOUR NEXT TEST test strip USE TO TEST BLOOD GLUCOSE ONCE DAILY 100 each 3    LUIS FERNANDO MICROLET LANCETS lancets by Does not apply route daily      clopidogrel (PLAVIX) 75 mg tablet Take 75 mg by mouth daily Indications: Heart Attack   gabapentin (NEURONTIN) 100 mg capsule Take 1 capsule by mouth 2 (two) times a day Takes with 400mg gabapentin      gabapentin (NEURONTIN) 400 mg capsule Take 400 mg by mouth 4 (four) times a day  400mg/2x day/  100mg/ 2x day      glimepiride (AMARYL) 2 mg tablet TAKE ONE TABLET BY MOUTH EVERY DAY AS DIRECTED 90 tablet 1    metoprolol succinate (TOPROL-XL) 25 mg 24 hr tablet Take 25 mg by mouth daily Indications: High Blood Pressure  12 5 mg am / 12 5 mg pm      pravastatin (PRAVACHOL) 40 mg tablet Take 40 mg by mouth daily  No current facility-administered medications for this visit          IMMUNIZATIONS:  Immunization History   Administered Date(s) Administered    Influenza Quadrivalent Preservative Free 3 years and older IM 10/21/2014, 10/15/2015    Influenza Split High Dose Preservative Free IM 09/08/2016    Influenza TIV (IM) 08/25/2017    Pneumococcal Conjugate 13-Valent 11/17/2015    Pneumococcal Polysaccharide PPV23 10/21/2014, 09/27/2017    Tdap 10/07/2016    Unknown 02/20/2018, 05/15/2018    Zoster 10/21/2014, 03/20/2018       HEALTH MAINTENANCE:  Health Maintenance   Topic Date Due    GLAUCOMA SCREENING 67+ YR  05/02/2018    INFLUENZA VACCINE  09/01/2018    OPHTHALMOLOGY EXAM  10/06/2018    HEMOGLOBIN A1C  12/15/2018    URINE MICROALBUMIN  03/17/2019    Fall Risk  03/20/2019    Depression Screening PHQ-9  03/20/2019    Diabetic Foot Exam  03/20/2019    CRC Screening: Colonoscopy  03/17/2026    DTaP,Tdap,and Td Vaccines (2 - Td) 10/07/2026    Hepatitis C Screening  Completed    PNEUMOCOCCAL POLYSACCHARIDE VACCINE AGE 72 AND OVER  Completed       PATIENT HEALTH RISK ASSESSMENT (HRA):  AWV Clinical     ISAR:   Previous hospitalizations?:  No       Once in a Lifetime Medicare Screening: EKG performed?:  Yes Results:  Done by cardiologist   AAA screening performed? (if performed, please add date to Health Maintenance):  No       Medicare Screening Tests and Risk Assessment:   AAA Risk Assessment    Age over 72 (males only):  Yes    Osteoporosis Risk Assessment    :  Yes    Age over 48:  Yes    HIV Risk Assessment    None indicated:  Yes        Drug and Alcohol Use:   Tobacco use    Cigarettes:  never smoker    Tobacco use duration    Tobacco Cessation Readiness    Alcohol use    Alcohol use:  never    Alcohol Treatment Readiness   Illicit Drug Use    Drug use:  never        Diet & Exercise:   Diet   What is your diet?:  Regular, Limited junk food   How many servings a day of the following:   Exercise    Do you currently exercise?:  yes    Frequency:  daily    Minutes per day:  60       Cognitive Impairment Screening:   Depression screening preformed:  Yes Depression screen score:  0   Depression screening results:  negative for symptoms   Cognitive Impairment Screening    Do you have difficulty learning or retaining new information?:  No Do you have difficulty handling new tasks?:  No   Do you have difficulty with reasoning?:  No Do you have difficulty with spatial ability and orientation?:  No   Do you have difficulty with language?:  No Do you have difficulty with behavior?:  No       Functional Ability/Level of Safety:   Hearing    Hearing difficulties:  No    Hearing Impairment Assessment    Current Activities    Status:  unlimited ADL's, unlimited driving, unlimited social activities   Help needed with the folllowing:    Using the phone:  No Transportation:  No   Shopping:  No Preparing Meals:  No   Doing Housework:  No Doing Laundry:  No   Managing Medications:  No Managing Money:  No   ADL    Feeding:  Independant   Oral hygiene and Facial grooming:  Independant   Bathing:  Independant   Upper Body Dressing:  Independant   Lower Body Dressing:  Independant   Toileting:  Independant Bed Mobility:  Independant   Fall Risk   Have you fallen in the last 12 months?:  No    Injury History       Home Safety:   Are there hazards in your environment?:  No   Home Safety Risk Factors   Unfamilar with surroundings:  No Uneven floors:  No   Stairs or handrail saftey risk:  No Loose rugs:  No   Household clutter:  No Poor household lighting:  No   No grab bars in bathroom:  Yes Further evaluation needed:  No       Advanced Directives:   Advanced Directives    Living Will:  No Durable POA for healthcare:  No   Advanced directive:  Yes    Patient's End of Life Decisions    Reviewed with patient:  Yes Provider agrees with end of life decisions:  Yes   End of life decisions comments:  His daughter that is a nurse will make decisions for him if something happens to him  Urinary Incontinence:   Do you have urinary incontinence?:  No        Glaucoma:             VITALS:  /70   Pulse 62   Temp 98 °F (36 7 °C)   Resp 18   Ht 5' 9 5" (1 765 m)   Wt 98 kg (216 lb)   BMI 31 44 kg/m²     Physical Exam   Constitutional: He is oriented to person, place, and time  He appears well-developed and well-nourished  HENT:   Head: Normocephalic and atraumatic  Right Ear: External ear normal    Left Ear: External ear normal    Mouth/Throat: Oropharynx is clear and moist    Cardiovascular: Normal rate, regular rhythm and normal heart sounds  No murmur heard  Pulmonary/Chest: Effort normal and breath sounds normal  No respiratory distress  He has no wheezes  Abdominal: Soft  There is no tenderness  Genitourinary: Rectum normal and prostate normal  Rectal exam shows guaiac negative stool  Musculoskeletal: Normal range of motion  He exhibits no edema  Neurological: He is alert and oriented to person, place, and time  Skin: Skin is warm and dry             Opal Michaels, [address]

## 2018-06-20 ENCOUNTER — OFFICE VISIT (OUTPATIENT)
Dept: FAMILY MEDICINE CLINIC | Facility: CLINIC | Age: 68
End: 2018-06-20
Payer: MEDICARE

## 2018-06-20 VITALS
BODY MASS INDEX: 30.92 KG/M2 | SYSTOLIC BLOOD PRESSURE: 110 MMHG | TEMPERATURE: 98 F | HEIGHT: 70 IN | RESPIRATION RATE: 18 BRPM | DIASTOLIC BLOOD PRESSURE: 70 MMHG | HEART RATE: 62 BPM | WEIGHT: 216 LBS

## 2018-06-20 DIAGNOSIS — E11.42 DIABETIC POLYNEUROPATHY ASSOCIATED WITH TYPE 2 DIABETES MELLITUS (HCC): ICD-10-CM

## 2018-06-20 DIAGNOSIS — Z12.11 COLON CANCER SCREENING: ICD-10-CM

## 2018-06-20 DIAGNOSIS — Z00.00 MEDICARE ANNUAL WELLNESS VISIT, SUBSEQUENT: Primary | ICD-10-CM

## 2018-06-20 LAB — SL AMB POCT FECES OCC BLD: NORMAL

## 2018-06-20 PROCEDURE — G0439 PPPS, SUBSEQ VISIT: HCPCS | Performed by: FAMILY MEDICINE

## 2018-06-20 PROCEDURE — 82270 OCCULT BLOOD FECES: CPT | Performed by: FAMILY MEDICINE

## 2018-06-20 NOTE — PATIENT INSTRUCTIONS
Recent Results (from the past 672 hour(s))   Jareth Oconnor Default    Collection Time: 06/15/18  7:53 AM   Result Value Ref Range    [facility] LAB WHITE BLOOD CELL COUNT 8 7 3 4 - 10 8 x10E3/uL    [facility] RED BLOOD CELLS 5 19 4 14 - 5 80 x10E6/uL    Hemoglobin 15 3 13 0 - 17 7 g/dL    Hematocrit 45 1 37 5 - 51 0 %    MCV 87 79 - 97 fL    MCH 29 5 26 6 - 33 0 pg    MCHC 33 9 31 5 - 35 7 g/dL    RDW 14 3 12 3 - 15 4 %    Platelet Count 523 559 - 379 x10E3/uL    Neutrophils 64 Not Estab  %    Lymphocytes 24 Not Estab  %    Monocytes 8 Not Estab  %    Eosinophils 3 Not Estab  %    Basophils 1 Not Estab  %    Neutrophils (Absolute) 5 5 1 4 - 7 0 x10E3/uL    Lymphocytes (Absolute) 2 1 0 7 - 3 1 x10E3/uL    Monocytes (Absolute) 0 7 0 1 - 0 9 x10E3/uL    Eosinophils (Absolute) 0 3 0 0 - 0 4 x10E3/uL    Basophils (Absolute) 0 1 0 0 - 0 2 x10E3/uL    IMM  GRANULOCYTES (CD4/8) 0 Not Estab  %    IIMM  GRANS,ABS (CD4/8) 0 0 0 0 - 0 1 x10E3/uL   Comprehensive metabolic panel    Collection Time: 06/15/18  7:53 AM   Result Value Ref Range    [facility] GLUCOSE 86 65 - 99 mg/dL    BUN 16 8 - 27 mg/dL    Creatinine, Serum 0 97 0 76 - 1 27 mg/dL    eGFR Non African American 80 >59 mL/min/1 73    SL AM[facility] EGFR AFRICAN AMERICAN 93 >59 mL/min/1 73    [facility] BUN/CREATININE RATIO 16 10 - 24    [facility] SODIUM 141 134 - 144 mmol/L    [facility] POTASSIUM 4 5 3 5 - 5 2 mmol/L    [facility] AM[facility] CHLORIDE 98 96 - 106 mmol/L    [facility] AM[facility] CARBON DIOXIDE 27 20 - 29 mmol/L    CALCIUM 9 4 8 6 - 10 2 mg/dL    [facility] PROTEIN, TOTAL 7 0 6 0 - 8 5 g/dL    Serum Albumin 4 5 3 6 - 4 8 g/dL    Globulin, Total 2 5 1 5 - 4 5 g/dL    [facility] ALBUMIN/GLOBULIN RATIO 1 8 1 2 - 2 2    [facility] BILIRUBIN, TOTAL 0 6 0 0 - 1 2 mg/dL    Alk Phos Isoenzymes 58 39 - 117 IU/L    [facility] AST 13 0 - 40 IU/L    SL AM[facility] ALT 14 0 - 44 IU/L   Lipid panel    Collection Time: 06/15/18  7:53 AM   Result Value Ref Range    Cholesterol, Total 152 100 - 199 mg/dL    Triglycerides 97 0 - 149 mg/dL    SL AM[facility] HDL CHOLESTEROL 50 >39 mg/dL    SL AMB LDL-CHOLESTEROL 83 0 - 99 mg/dL   Hemoglobin A1C    Collection Time: 06/15/18  7:53 AM   Result Value Ref Range    Hemoglobin A1C 6 2 (H) 4 8 - 5 6 %    Estimated Average Glucose 131 mg/dL   PSA Total, Diagnostic    Collection Time: 06/15/18  7:53 AM   Result Value Ref Range    Prostate Specific Antigen Total 1 5 0 0 - 4 0 ng/mL   Gill Crate Default    Collection Time: 06/15/18  7:53 AM   Result Value Ref Range    AMBIG ABBREV DEFAULT Comment    Cardiovascular Report    Collection Time: 06/15/18  7:53 AM   Result Value Ref Range    [facility] INTERPRETATION Note

## 2018-08-07 ENCOUNTER — OFFICE VISIT (OUTPATIENT)
Dept: PODIATRY | Facility: CLINIC | Age: 68
End: 2018-08-07
Payer: MEDICARE

## 2018-08-07 VITALS
WEIGHT: 216 LBS | BODY MASS INDEX: 30.92 KG/M2 | SYSTOLIC BLOOD PRESSURE: 135 MMHG | HEIGHT: 70 IN | DIASTOLIC BLOOD PRESSURE: 82 MMHG

## 2018-08-07 DIAGNOSIS — L60.0 INGROWN TOENAIL: ICD-10-CM

## 2018-08-07 DIAGNOSIS — I70.209 ATHEROSCLEROSIS OF ARTERIES OF EXTREMITIES (HCC): ICD-10-CM

## 2018-08-07 DIAGNOSIS — M79.672 PAIN IN BOTH FEET: ICD-10-CM

## 2018-08-07 DIAGNOSIS — M79.671 PAIN IN BOTH FEET: ICD-10-CM

## 2018-08-07 DIAGNOSIS — E11.42 DIABETIC POLYNEUROPATHY ASSOCIATED WITH TYPE 2 DIABETES MELLITUS (HCC): ICD-10-CM

## 2018-08-07 DIAGNOSIS — B35.1 ONYCHOMYCOSIS: Primary | ICD-10-CM

## 2018-08-07 PROCEDURE — 99213 OFFICE O/P EST LOW 20 MIN: CPT | Performed by: PODIATRIST

## 2018-08-07 NOTE — PROGRESS NOTES
Assessment/Plan:    Aseptic debridement and planning of nails x10 and manually and mechanically    Partial removal of ingrown right hallux  Applied silver nitrate and dressing  Patient call if any redness or signs of infection  Discussed possible need for permanent removal of ingrown  Discussed proper diabetic foot care  Discussed proper shoes and proper shoe fit  Follow-up 1 month  Diagnoses and all orders for this visit:    Onychomycosis    Atherosclerosis of arteries of extremities ([place] [address] )    Diabetic polyneuropathy associated with type 2 diabetes mellitus ([place] [address] )    Pain in both feet    Ingrown toenail          Subjective:      Patient ID: Adalberto Solano is a 80 y o  male  Patient has been ingrown nails big toes right worse than left  Has some swelling of the right big toe for the past few days  Has not noticed any redness or drainage  Patient says blood sugar has been well-controlled last A1c was 6 2  Patient walks 1-2 miles a day  Denies any cramping in legs falls or instability            Past Medical History:   Diagnosis Date    Acquired ankle/foot deformity     last assessed: 05/30/2017    Acute myocardial infarction [facility])     last assessed: 12/03/2013    Anemia     last assessed: 12/03/2013    Anxiety     last assessed: 12/03/2013    Cardiomyopathy ([place] [address] )     last assessed: 12/03/2013    Congestive heart failure (CHF) ([place] [address] )     onset: 02/29/2012    Coronary artery disease     Diabetes mellitus ([place] [address] )     Dysesthesia     last assessed: 09/10/2014    Exposure to hepatitis     last assessed: 11/15/2016    Foot slap     last assessed: 03/17/2016    Hypertension     Myocardial infarction [facility])     Palsy of left sciatic nerve     last assessed: 03/17/2016    Subconjunctival hemorrhage     last assessed: 03/03/2014    Xerosis cutis     last assessed: 10/25/2016       Past Surgical History:   Procedure Laterality Date    ANGIOPLASTY      2 coronary stents    CHOLECYSTECTOMY      COLONOSCOPY N/A 3/17/2016    Procedure: COLONOSCOPY;  Surgeon: Anila Stevenson MD;  Location: [facility]; Service:    [address]      right inguinal        No Known Allergies      Current Outpatient Prescriptions:     aspirin 325 mg tablet, Take 325 mg by mouth daily  , Disp: , Rfl:     LUIS FERNANDO CONTOUR NEXT TEST test strip, USE TO TEST BLOOD GLUCOSE ONCE DAILY, Disp: 100 each, Rfl: 3    LUIS FERNANDO MICROLET LANCETS lancets, by Does not apply route daily, Disp: , Rfl:     clopidogrel (PLAVIX) 75 mg tablet, Take 75 mg by mouth daily Indications: Heart Attack  , Disp: , Rfl:     gabapentin (NEURONTIN) 100 mg capsule, Take 1 capsule by mouth 2 (two) times a day Takes with 400mg gabapentin, Disp: , Rfl:     gabapentin (NEURONTIN) 400 mg capsule, Take 400 mg by mouth 4 (four) times a day  400mg/2x day/  100mg/ 2x day, Disp: , Rfl:     glimepiride (AMARYL) 2 mg tablet, TAKE ONE TABLET BY MOUTH EVERY DAY AS DIRECTED, Disp: 90 tablet, Rfl: 1    metoprolol succinate (TOPROL-XL) 25 mg 24 hr tablet, Take 25 mg by mouth daily Indications: High Blood Pressure  12 5 mg am / 12 5 mg pm, Disp: , Rfl:     pravastatin (PRAVACHOL) 40 mg tablet, Take 40 mg by mouth daily  , Disp: , Rfl:     Patient Active Problem List   Diagnosis    Benign essential hypertension    Diabetic polyneuropathy associated with type 2 diabetes mellitus ([facility] [address] )    Mixed hyperlipidemia    Impotence    Myocardial infarction ([facility] )    Nerve palsy    Onychomycosis    Peripheral neuropathy    Reflex sympathetic dystrophy    Tabes dorsalis    Atherosclerosis of arteries of extremities (HCC)       Review of Systems   Constitutional: Negative  HENT: Negative  Eyes: Negative  Respiratory: Negative  Cardiovascular: Negative  Gastrointestinal: Negative  Endocrine: Negative  Genitourinary: Negative  Musculoskeletal: Negative  Skin: Negative  Allergic/Immunologic: Negative  Neurological: Negative  Hematological: Negative  Psychiatric/Behavioral: Negative  Objective:      /82   Ht 5' 9 5" (1 765 m)   Wt 98 kg (216 lb)   BMI 31 44 kg/m²          Physical Exam   Cardiovascular: Pulses are weak pulses  Pulses:       Dorsalis pedis pulses are 1+ on the right side, and 1+ on the left side  Posterior tibial pulses are 1+ on the right side, and 1+ on the left side  Feet:   Right Foot:   Skin Integrity: Positive for callus and dry skin  Left Foot:   Skin Integrity: Positive for callus and dry skin  Patient's shoes and socks removed  Right Foot/Ankle   Right Foot Inspection  Skin Exam: dry skin, callus and callus                            Sensory   Vibration: absent  Proprioception: diminished   Monofilament testing: diminished  Vascular  Capillary refills: elevated  The right DP pulse is 1+  The right PT pulse is 1+  Left Foot/Ankle  Left Foot Inspection  Skin Exam: dry skin and callus                                         Sensory   Vibration: absent  Proprioception: diminished  Monofilament: diminished  Vascular  Capillary refills: elevated  The left DP pulse is 1+  The left PT pulse is 1+  Assign Risk Category:  Deformity present; Loss of protective sensation; Weak pulses       Risk: 2       Constitutional: no acute distress, well appearing and well nourished       Orthopedic/Biomechanical: ingrown nails       Skin: Left hallux, decreased erythema decreased exudate, resolving, infection       Neurologic: decreased vibration sensation       Psychiatric: oriented to person, place, and time     Left Foot: Appearance: Normal except as noted: excessive pronation-- and-- pes planus  Great toe deformities include a bunion  Second toe deformities include hammer toe  Third toe deformities include hammer toe  Forth toe deformities include hammer toe  Fifth toe deformities include hammer toe  Evaluation of the great toe nail demonstrates paronychia-- and-- an ingrown nail   Evaluation of the fourth toe nail demonstrates subungual hematoma  [place] +1 pitting edema bilateral feet and ankles negative calf tenderness negative Homans sign  ROM: Full  Special Tests: Negative erythema, mild edema, no signs of infection no open wounds     Right Foot: Appearance: Normal except as noted: excessive pronation-- and-- pes planus  Second toe deformities include hammer toe  Third toe deformities include hammer toe  Forth toe deformities include hammer toe  Fifth toe deformities include hammer toe  Left Ankle: ROM: limited ROM in all planes Motor: diffuse weakness     Right Ankle: ROM: limited ROM in all planes Motor: diffuse weakness     Neurological Exam: performed  Light touch was intact bilaterally  Vibratory sensation was decreased in the right ankle-- and-- decreased in the left first metatarsophalangeal joint  Response to monofilament test was intact bilaterally     Hyperkeratosis: present on both second toes,-- present on the right second toe,-- present on both first sub metatarsals-- and-- present on both fifth sub metatarsals     Shoe Gear Evaluation: performed ()  Right Foot: width: e-- and-- length: 11  Left Foot: width: e-- and-- length: 11  Recommendation(s): extra depth diabetic shoes-- and-- SAS style  Preulcerative lesions on distal aspect of the 2nd digit right worse than left  Skin is hairless and atrophic  Negative calf tenderness mild edema  No signs of infection    Negative Tinel sign tarsal tunnel bilateral  Muscle strength 5/5 all groups bilateral feet and ankles  Significantly reduced sensation bilateral   Negative erythema no signs of infection  Ingrown nail bilateral hallux  Right worse than left  Mild swelling right tibial border  Negative exudate negative purulence negative sign of infection

## 2018-08-27 DIAGNOSIS — G60.9 IDIOPATHIC PERIPHERAL NEUROPATHY: Primary | ICD-10-CM

## 2018-08-27 RX ORDER — GABAPENTIN 400 MG/1
CAPSULE ORAL
Qty: 360 CAPSULE | Refills: 0 | Status: SHIPPED | OUTPATIENT
Start: 2018-08-27 | End: 2018-11-27 | Stop reason: SDUPTHER

## 2018-08-27 RX ORDER — GABAPENTIN 100 MG/1
CAPSULE ORAL
Qty: 180 CAPSULE | Refills: 1 | Status: SHIPPED | OUTPATIENT
Start: 2018-08-27 | End: 2019-02-22 | Stop reason: SDUPTHER

## 2018-09-01 DIAGNOSIS — E78.2 MIXED HYPERLIPIDEMIA: Primary | ICD-10-CM

## 2018-09-03 RX ORDER — PRAVASTATIN SODIUM 40 MG
TABLET ORAL
Qty: 90 TABLET | Refills: 1 | Status: SHIPPED | OUTPATIENT
Start: 2018-09-03 | End: 2019-02-18 | Stop reason: SDUPTHER

## 2018-09-12 DIAGNOSIS — E11.42 DIABETIC POLYNEUROPATHY ASSOCIATED WITH TYPE 2 DIABETES MELLITUS (HCC): Primary | ICD-10-CM

## 2018-09-18 LAB
ALBUMIN SERPL-MCNC: 4.5 G/DL (ref 3.6–4.8)
ALBUMIN/GLOB SERPL: 1.7 {RATIO} (ref 1.2–2.2)
ALP SERPL-CCNC: 50 IU/L (ref 39–117)
ALT SERPL-CCNC: 18 IU/L (ref 0–44)
AST SERPL-CCNC: 15 IU/L (ref 0–40)
BILIRUB SERPL-MCNC: 0.5 MG/DL (ref 0–1.2)
BUN SERPL-MCNC: 12 MG/DL (ref 8–27)
BUN/CREAT SERPL: 13 (ref 10–24)
CALCIUM SERPL-MCNC: 9.5 MG/DL (ref 8.6–10.2)
CHLORIDE SERPL-SCNC: 99 MMOL/L (ref 96–106)
CO2 SERPL-SCNC: 27 MMOL/L (ref 20–29)
CREAT SERPL-MCNC: 0.94 MG/DL (ref 0.76–1.27)
EST. AVERAGE GLUCOSE BLD GHB EST-MCNC: 134 MG/DL
GLOBULIN SER-MCNC: 2.6 G/DL (ref 1.5–4.5)
GLUCOSE SERPL-MCNC: 72 MG/DL (ref 65–99)
HBA1C MFR BLD: 6.3 % (ref 4.8–5.6)
POTASSIUM SERPL-SCNC: 4.5 MMOL/L (ref 3.5–5.2)
PROT SERPL-MCNC: 7.1 G/DL (ref 6–8.5)
SL AMB EGFR AFRICAN AMERICAN: 97 ML/MIN/1.73
SL AMB EGFR NON AFRICAN AMERICAN: 84 ML/MIN/1.73
SODIUM SERPL-SCNC: 140 MMOL/L (ref 134–144)

## 2018-09-20 ENCOUNTER — OFFICE VISIT (OUTPATIENT)
Dept: FAMILY MEDICINE CLINIC | Facility: CLINIC | Age: 68
End: 2018-09-20
Payer: MEDICARE

## 2018-09-20 VITALS
WEIGHT: 220 LBS | BODY MASS INDEX: 31.5 KG/M2 | TEMPERATURE: 97.2 F | HEIGHT: 70 IN | DIASTOLIC BLOOD PRESSURE: 70 MMHG | RESPIRATION RATE: 18 BRPM | HEART RATE: 60 BPM | SYSTOLIC BLOOD PRESSURE: 116 MMHG

## 2018-09-20 DIAGNOSIS — I10 BENIGN ESSENTIAL HYPERTENSION: Primary | ICD-10-CM

## 2018-09-20 DIAGNOSIS — E78.2 MIXED HYPERLIPIDEMIA: ICD-10-CM

## 2018-09-20 DIAGNOSIS — E11.42 DIABETIC POLYNEUROPATHY ASSOCIATED WITH TYPE 2 DIABETES MELLITUS (HCC): ICD-10-CM

## 2018-09-20 PROCEDURE — 99214 OFFICE O/P EST MOD 30 MIN: CPT | Performed by: FAMILY MEDICINE

## 2018-09-20 NOTE — PROGRESS NOTES
Assessment/Plan:    Problem List Items Addressed This Visit     Benign essential hypertension - Primary     Well controlled         Relevant Orders    CBC    Comprehensive metabolic panel    Lipid Panel with Direct LDL reflex    Diabetic polyneuropathy associated with type 2 diabetes mellitus ([address] )     Lab Results   Component Value Date    HGBA1C 6 3 (H) 09/17/2018       No results for input(s): POCGLU in the last 72 hours  Blood Sugar Average: Last 72 hrs: Well controlled  Relevant Orders    Comprehensive metabolic panel    Hemoglobin A1C    Mixed hyperlipidemia     Well controlled  Continue pravastatin         Relevant Orders    Comprehensive metabolic panel    Lipid Panel with Direct LDL reflex          Patient Instructions     Recent Results (from the past 672 hour(s))   Comprehensive metabolic panel    Collection Time: 09/17/18  7:53 AM   Result Value Ref Range    Glucose 72 65 - 99 mg/dL    BUN 12 8 - 27 mg/dL    Creatinine 0 94 0 76 - 1 27 mg/dL    eGFR Non  84 >59 mL/min/1 73    SL AMB EGFR AFRICAN AMERICAN 97 >59 mL/min/1 73    SL AMB BUN/CREATININE RATIO 13 10 - 24    Sodium 140 134 - 144 mmol/L    SL AMB POTASSIUM 4 5 3 5 - 5 2 mmol/L    Chloride 99 96 - 106 mmol/L    CO2 27 20 - 29 mmol/L    CALCIUM 9 5 8 6 - 10 2 mg/dL    SL AMB PROTEIN, TOTAL 7 1 6 0 - 8 5 g/dL    Albumin 4 5 3 6 - 4 8 g/dL    Globulin, Total 2 6 1 5 - 4 5 g/dL    SL AMB ALBUMIN/GLOBULIN RATIO 1 7 1 2 - 2 2    TOTAL BILIRUBIN 0 5 0 0 - 1 2 mg/dL    Alk Phos Isoenzymes 50 39 - 117 IU/L    SL AMB AST 15 0 - 40 IU/L    SL AMB ALT 18 0 - 44 IU/L   Hemoglobin A1C    Collection Time: 09/17/18  7:53 AM   Result Value Ref Range    Hemoglobin A1C 6 3 (H) 4 8 - 5 6 %    Estimated Average Glucose 134 mg/dL           Return in about 3 months (around 12/20/2018) for Next scheduled follow up  Subjective:      Patient ID: Rosanna Sood is a 80 y o  male      Chief Complaint   Patient presents with    Follow-up lab work results and blood pressure check  klpn       He is feeling well  Hyperlipidemia - tolerating his pravastatin  He is doing well on it  Hypertension - he is not having any headaches  The following portions of the patient's history were reviewed and updated as appropriate:  past social history    Review of Systems   Constitutional: Negative  Respiratory: Negative  Cardiovascular: Negative  Current Outpatient Prescriptions   Medication Sig Dispense Refill    aspirin 325 mg tablet Take 325 mg by mouth daily   LUIS FERNANDO CONTOUR NEXT TEST test strip USE TO TEST BLOOD GLUCOSE ONCE DAILY 100 each 3    LUIS FERNANDO MICROLET LANCETS lancets Test glucose once daily DX: E11 42 100 each 3    clopidogrel (PLAVIX) 75 mg tablet Take 75 mg by mouth daily Indications: Heart Attack   gabapentin (NEURONTIN) 100 mg capsule TAKE ONE CAPSULE BY MOUTH TWICE A  capsule 1    gabapentin (NEURONTIN) 400 mg capsule TAKE ONE CAPSULE BY MOUTH FOUR TIMES A  capsule 0    glimepiride (AMARYL) 2 mg tablet TAKE ONE TABLET BY MOUTH EVERY DAY AS DIRECTED 90 tablet 1    metoprolol succinate (TOPROL-XL) 25 mg 24 hr tablet Take 25 mg by mouth daily Indications: High Blood Pressure  12 5 mg am / 12 5 mg pm      pravastatin (PRAVACHOL) 40 mg tablet TAKE ONE TABLET BY MOUTH EVERY DAY 90 tablet 1     No current facility-administered medications for this visit  Objective:    /70   Pulse 60   Temp (!) 97 2 °F (36 2 °C)   Resp 18   Ht 5' 9 5" (1 765 m)   Wt 99 8 kg (220 lb)   BMI 32 02 kg/m²        Physical Exam   Constitutional: He appears well-developed and well-nourished  HENT:   Head: Normocephalic and atraumatic  Right Ear: External ear normal    Left Ear: External ear normal    Mouth/Throat: Oropharynx is clear and moist    Cardiovascular: Normal rate, regular rhythm and normal heart sounds  No murmur heard    Pulmonary/Chest: Effort normal and breath sounds normal  No respiratory distress  He has no wheezes  He has no rales  Musculoskeletal: He exhibits no edema or tenderness  Nursing note and vitals reviewed               Lucille Garrett DO

## 2018-09-20 NOTE — PATIENT INSTRUCTIONS
Recent Results (from the past 672 hour(s))   Comprehensive metabolic panel    Collection Time: 09/17/18  7:53 AM   Result Value Ref Range    Glucose 72 65 - 99 mg/dL    BUN 12 8 - 27 mg/dL    Creatinine 0 94 0 76 - 1 27 mg/dL    eGFR Non  84 >59 mL/min/1 73    SL AMB EGFR AFRICAN AMERICAN 97 >59 mL/min/1 73    SL AMB BUN/CREATININE RATIO 13 10 - 24    Sodium 140 134 - 144 mmol/L    SL AMB POTASSIUM 4 5 3 5 - 5 2 mmol/L    Chloride 99 96 - 106 mmol/L    CO2 27 20 - 29 mmol/L    CALCIUM 9 5 8 6 - 10 2 mg/dL    SL AMB PROTEIN, TOTAL 7 1 6 0 - 8 5 g/dL    Albumin 4 5 3 6 - 4 8 g/dL    Globulin, Total 2 6 1 5 - 4 5 g/dL    SL AMB ALBUMIN/GLOBULIN RATIO 1 7 1 2 - 2 2    TOTAL BILIRUBIN 0 5 0 0 - 1 2 mg/dL    Alk Phos Isoenzymes 50 39 - 117 IU/L    SL AMB AST 15 0 - 40 IU/L    SL AMB ALT 18 0 - 44 IU/L   Hemoglobin A1C    Collection Time: 09/17/18  7:53 AM   Result Value Ref Range    Hemoglobin A1C 6 3 (H) 4 8 - 5 6 %    Estimated Average Glucose 134 mg/dL

## 2018-10-23 LAB
LEFT EYE DIABETIC RETINOPATHY: NORMAL
RIGHT EYE DIABETIC RETINOPATHY: NORMAL

## 2018-10-30 ENCOUNTER — OFFICE VISIT (OUTPATIENT)
Dept: PODIATRY | Facility: CLINIC | Age: 68
End: 2018-10-30
Payer: MEDICARE

## 2018-10-30 VITALS
DIASTOLIC BLOOD PRESSURE: 70 MMHG | BODY MASS INDEX: 31.5 KG/M2 | WEIGHT: 220 LBS | HEART RATE: 60 BPM | SYSTOLIC BLOOD PRESSURE: 116 MMHG | HEIGHT: 70 IN | RESPIRATION RATE: 16 BRPM

## 2018-10-30 DIAGNOSIS — L60.0 INGROWN TOENAIL: ICD-10-CM

## 2018-10-30 DIAGNOSIS — R20.2 PARESTHESIA OF BOTH FEET: ICD-10-CM

## 2018-10-30 DIAGNOSIS — M79.671 PAIN IN BOTH FEET: Primary | ICD-10-CM

## 2018-10-30 DIAGNOSIS — E11.42 DIABETIC POLYNEUROPATHY ASSOCIATED WITH TYPE 2 DIABETES MELLITUS (HCC): ICD-10-CM

## 2018-10-30 DIAGNOSIS — I70.209 ATHEROSCLEROSIS OF ARTERIES OF EXTREMITIES (HCC): ICD-10-CM

## 2018-10-30 DIAGNOSIS — M79.672 PAIN IN BOTH FEET: Primary | ICD-10-CM

## 2018-10-30 PROCEDURE — 99213 OFFICE O/P EST LOW 20 MIN: CPT | Performed by: PODIATRIST

## 2018-10-30 NOTE — PROGRESS NOTES
Assessment/Plan:    Aseptic debridement and planning of nails x10 and manually and mechanically    Discussed treatments for neuropathy  Discussed vitamin supplements and exercise  Discussed physical therapy  Discussed possible need for permanent removal of ingrown  Follow-up 1 month     Diagnoses and all orders for this visit:    Pain in both feet    Diabetic polyneuropathy associated with type 2 diabetes mellitus ([facility] )    Atherosclerosis of arteries of extremities (HCC)    Paresthesia of both feet    Ingrown toenail          Subjective:      Patient ID: Saeid Menchaca is a 80 y o  male  Patient has been having some tingling and burning in feet mostly at night  Mild pain rates pain 3/10 pain scale  Patient says blood sugar has been well controlled A1c was around 6  Patient denies any cramping in legs or falls  Patient does get recurrent ingrown nails  Has not noticed any redness or signs of infection          Past Medical History:   Diagnosis Date    Acquired ankle/foot deformity     last assessed: 05/30/2017    Acute myocardial infarction [facility])     last assessed: 12/03/2013    Anemia     last assessed: 12/03/2013    Anxiety     last assessed: 12/03/2013    Cardiomyopathy ([place] [address] )     last assessed: 12/03/2013    Congestive heart failure (CHF) ([place] [address] )     onset: 02/29/2012    Coronary artery disease     Diabetes mellitus ([place] [address] )     Dysesthesia     last assessed: 09/10/2014    Exposure to hepatitis     last assessed: 11/15/2016    Foot slap     last assessed: 03/17/2016    Hypertension     Myocardial infarction [facility])     Palsy of left sciatic nerve     last assessed: 03/17/2016    Subconjunctival hemorrhage     last assessed: 03/03/2014    Xerosis cutis     last assessed: 10/25/2016       Past Surgical History:   Procedure Laterality Date    ANGIOPLASTY      2 coronary stents    CHOLECYSTECTOMY      COLONOSCOPY N/A 3/17/2016    Procedure: COLONOSCOPY;  Surgeon: Claude Marine, MD;  Location: [facility] GI LAB; Service:    [address] [facility]      right inguinal        No Known Allergies      Current Outpatient Prescriptions:     aspirin 325 mg tablet, Take 325 mg by mouth daily  , Disp: , Rfl:     LUIS FERNANDO CONTOUR NEXT TEST test strip, USE TO TEST BLOOD GLUCOSE ONCE DAILY, Disp: 100 each, Rfl: 3    LUIS FERNANDO MICROLET LANCETS lancets, Test glucose once daily DX: E11 42, Disp: 100 each, Rfl: 3    clopidogrel (PLAVIX) 75 mg tablet, Take 75 mg by mouth daily Indications: Heart Attack  , Disp: , Rfl:     gabapentin (NEURONTIN) 100 mg capsule, TAKE ONE CAPSULE BY MOUTH TWICE A DAY, Disp: 180 capsule, Rfl: 1    gabapentin (NEURONTIN) 400 mg capsule, TAKE ONE CAPSULE BY MOUTH FOUR TIMES A DAY, Disp: 360 capsule, Rfl: 0    glimepiride (AMARYL) 2 mg tablet, TAKE ONE TABLET BY MOUTH EVERY DAY AS DIRECTED, Disp: 90 tablet, Rfl: 1    metoprolol succinate (TOPROL-XL) 25 mg 24 hr tablet, Take 25 mg by mouth daily Indications: High Blood Pressure  12 5 mg am / 12 5 mg pm, Disp: , Rfl:     pravastatin (PRAVACHOL) 40 mg tablet, TAKE ONE TABLET BY MOUTH EVERY DAY, Disp: 90 tablet, Rfl: 1    Patient Active Problem List   Diagnosis    Benign essential hypertension    Diabetic polyneuropathy associated with type 2 diabetes mellitus (HCC)    Mixed hyperlipidemia    Impotence    Myocardial infarction (HCC)    Nerve palsy    Onychomycosis    Peripheral neuropathy    Reflex sympathetic dystrophy    Tabes dorsalis    Atherosclerosis of arteries of extremities (HCC)       Review of Systems   Constitutional: Negative  HENT: Negative  Eyes: Negative  Respiratory: Negative  Cardiovascular: Negative  Gastrointestinal: Negative  Endocrine: Negative  Genitourinary: Negative  Musculoskeletal: Negative  Skin: Negative  Allergic/Immunologic: Negative  Neurological: Negative  Hematological: Negative  Psychiatric/Behavioral: Negative            Objective:      /70   Pulse 60   Resp 16   Ht 5' 9 5" (1 765 m)   Wt 99 8 kg (220 lb)   BMI 32 02 kg/m²          Physical Exam   Cardiovascular: Pulses are weak pulses  Pulses:       Dorsalis pedis pulses are 1+ on the right side, and 1+ on the left side  Posterior tibial pulses are 1+ on the right side, and 1+ on the left side  Feet:   Right Foot:   Skin Integrity: Positive for callus and dry skin  Left Foot:   Skin Integrity: Positive for callus and dry skin  Patient's shoes and socks removed  Right Foot/Ankle   Right Foot Inspection  Skin Exam: dry skin, callus and callus                            Sensory   Vibration: absent  Proprioception: diminished   Monofilament testing: diminished  Vascular  Capillary refills: elevated  The right DP pulse is 1+  The right PT pulse is 1+  Left Foot/Ankle  Left Foot Inspection  Skin Exam: dry skin and callus                                         Sensory   Vibration: absent  Proprioception: diminished  Monofilament: diminished  Vascular  Capillary refills: elevated  The left DP pulse is 1+  The left PT pulse is 1+  Assign Risk Category:  Deformity present; Loss of protective sensation; Weak pulses       Risk: 2       Constitutional: no acute distress, well appearing and well nourished       Orthopedic/Biomechanical: ingrown nails       Skin: Left hallux, decreased erythema decreased exudate, resolving, infection       Neurologic: decreased vibration sensation       Psychiatric: oriented to person, place, and time     Left Foot: Appearance: Normal except as noted: excessive pronation-- and-- pes planus  Great toe deformities include a bunion  Second toe deformities include hammer toe  Third toe deformities include hammer toe  Forth toe deformities include hammer toe  Fifth toe deformities include hammer toe  Evaluation of the great toe nail demonstrates paronychia-- and-- an ingrown nail  Evaluation of the fourth toe nail demonstrates subungual hematoma  [place]  +1 pitting edema bilateral feet and ankles negative calf tenderness negative Homans sign  ROM: Full  Special Tests: Negative erythema, mild edema, no signs of infection no open wounds     Right Foot: Appearance: Normal except as noted: excessive pronation-- and-- pes planus  Second toe deformities include hammer toe  Third toe deformities include hammer toe  Forth toe deformities include hammer toe  Fifth toe deformities include hammer toe  Left Ankle: ROM: limited ROM in all planes Motor: diffuse weakness     Right Ankle: ROM: limited ROM in all planes Motor: diffuse weakness     Neurological Exam: performed  Light touch was intact bilaterally  Vibratory sensation was decreased in the right ankle-- and-- decreased in the left first metatarsophalangeal joint  Response to monofilament test was intact bilaterally     Hyperkeratosis: present on both second toes,-- present on the right second toe,-- present on both first sub metatarsals-- and-- present on both fifth sub metatarsals     Shoe Gear Evaluation: performed ()  Right Foot: width: e-- and-- length: 11  Left Foot: width: e-- and-- length: 11  Recommendation(s): extra depth diabetic shoes-- and-- SAS style  Preulcerative lesions on distal aspect of the 2nd digit right worse than left  Skin is hairless and atrophic  Negative calf tenderness mild edema  No signs of infection    Negative Tinel sign tarsal tunnel bilateral  Muscle strength 5/5 all groups bilateral feet and ankles  Significantly reduced sensation bilateral   Negative erythema no signs of infection  Foot Exam    Right Foot/Ankle     Inspection and Palpation  Skin Exam: callus and dry skin;     Neurovascular  Dorsalis pedis: 1+  Posterior tibial: 1+      Left Foot/Ankle      Inspection and Palpation  Skin Exam: callus and dry skin;     Neurovascular  Dorsalis pedis: 1+  Posterior tibial: 1+          Ingrown hallux  Nail is thickened dystrophic  There is swelling of the right hallux medial border    No exudate no signs of infection positive pain on palpation  Decreased vibratory sensation bilateral

## 2018-11-20 DIAGNOSIS — E11.42 DIABETIC POLYNEUROPATHY ASSOCIATED WITH TYPE 2 DIABETES MELLITUS (HCC): ICD-10-CM

## 2018-11-20 RX ORDER — GLIMEPIRIDE 2 MG/1
TABLET ORAL
Qty: 90 TABLET | Refills: 1 | Status: SHIPPED | OUTPATIENT
Start: 2018-11-20 | End: 2019-05-19 | Stop reason: SDUPTHER

## 2018-11-27 DIAGNOSIS — G60.9 IDIOPATHIC PERIPHERAL NEUROPATHY: ICD-10-CM

## 2018-11-27 RX ORDER — GABAPENTIN 400 MG/1
CAPSULE ORAL
Qty: 360 CAPSULE | Refills: 1 | Status: SHIPPED | OUTPATIENT
Start: 2018-11-27 | End: 2019-02-22 | Stop reason: SDUPTHER

## 2018-12-15 LAB
ALBUMIN SERPL-MCNC: 4.4 G/DL (ref 3.6–4.8)
ALBUMIN/GLOB SERPL: 1.8 {RATIO} (ref 1.2–2.2)
ALP SERPL-CCNC: 57 IU/L (ref 39–117)
ALT SERPL-CCNC: 22 IU/L (ref 0–44)
AST SERPL-CCNC: 19 IU/L (ref 0–40)
BASOPHILS # BLD AUTO: 0 X10E3/UL (ref 0–0.2)
BASOPHILS NFR BLD AUTO: 1 %
BILIRUB SERPL-MCNC: 0.6 MG/DL (ref 0–1.2)
BUN SERPL-MCNC: 16 MG/DL (ref 8–27)
BUN/CREAT SERPL: 16 (ref 10–24)
CALCIUM SERPL-MCNC: 9.6 MG/DL (ref 8.6–10.2)
CHLORIDE SERPL-SCNC: 101 MMOL/L (ref 96–106)
CHOLEST SERPL-MCNC: 163 MG/DL (ref 100–199)
CO2 SERPL-SCNC: 26 MMOL/L (ref 20–29)
CREAT SERPL-MCNC: 0.98 MG/DL (ref 0.76–1.27)
EOSINOPHIL # BLD AUTO: 0.2 X10E3/UL (ref 0–0.4)
EOSINOPHIL NFR BLD AUTO: 3 %
ERYTHROCYTE [DISTWIDTH] IN BLOOD BY AUTOMATED COUNT: 13.9 % (ref 12.3–15.4)
GLOBULIN SER-MCNC: 2.5 G/DL (ref 1.5–4.5)
GLUCOSE SERPL-MCNC: 70 MG/DL (ref 65–99)
HBA1C MFR BLD: 6.7 % (ref 4.8–5.6)
HCT VFR BLD AUTO: 49.4 % (ref 37.5–51)
HDLC SERPL-MCNC: 45 MG/DL
HGB BLD-MCNC: 16.6 G/DL (ref 13–17.7)
IMM GRANULOCYTES # BLD: 0 X10E3/UL (ref 0–0.1)
IMM GRANULOCYTES NFR BLD: 0 %
LABCORP COMMENT: NORMAL
LDLC SERPL CALC-MCNC: 99 MG/DL (ref 0–99)
LYMPHOCYTES # BLD AUTO: 1.9 X10E3/UL (ref 0.7–3.1)
LYMPHOCYTES NFR BLD AUTO: 28 %
MCH RBC QN AUTO: 29.1 PG (ref 26.6–33)
MCHC RBC AUTO-ENTMCNC: 33.6 G/DL (ref 31.5–35.7)
MCV RBC AUTO: 87 FL (ref 79–97)
MICRODELETION SYND BLD/T FISH: NORMAL
MONOCYTES # BLD AUTO: 0.5 X10E3/UL (ref 0.1–0.9)
MONOCYTES NFR BLD AUTO: 7 %
NEUTROPHILS # BLD AUTO: 4.1 X10E3/UL (ref 1.4–7)
NEUTROPHILS NFR BLD AUTO: 61 %
PLATELET # BLD AUTO: 235 X10E3/UL (ref 150–379)
POTASSIUM SERPL-SCNC: 5 MMOL/L (ref 3.5–5.2)
PROT SERPL-MCNC: 6.9 G/DL (ref 6–8.5)
RBC # BLD AUTO: 5.7 X10E6/UL (ref 4.14–5.8)
SL AMB EGFR AFRICAN AMERICAN: 92 ML/MIN/1.73
SL AMB EGFR NON AFRICAN AMERICAN: 79 ML/MIN/1.73
SODIUM SERPL-SCNC: 140 MMOL/L (ref 134–144)
TRIGL SERPL-MCNC: 96 MG/DL (ref 0–149)
WBC # BLD AUTO: 6.8 X10E3/UL (ref 3.4–10.8)

## 2018-12-19 ENCOUNTER — OFFICE VISIT (OUTPATIENT)
Dept: FAMILY MEDICINE CLINIC | Facility: CLINIC | Age: 68
End: 2018-12-19
Payer: MEDICARE

## 2018-12-19 VITALS
BODY MASS INDEX: 32.33 KG/M2 | HEART RATE: 62 BPM | RESPIRATION RATE: 16 BRPM | WEIGHT: 225.8 LBS | DIASTOLIC BLOOD PRESSURE: 80 MMHG | HEIGHT: 70 IN | SYSTOLIC BLOOD PRESSURE: 126 MMHG | TEMPERATURE: 96.8 F

## 2018-12-19 DIAGNOSIS — I10 BENIGN ESSENTIAL HYPERTENSION: ICD-10-CM

## 2018-12-19 DIAGNOSIS — E78.2 MIXED HYPERLIPIDEMIA: ICD-10-CM

## 2018-12-19 DIAGNOSIS — E11.42 DIABETIC POLYNEUROPATHY ASSOCIATED WITH TYPE 2 DIABETES MELLITUS (HCC): Primary | ICD-10-CM

## 2018-12-19 PROCEDURE — 99214 OFFICE O/P EST MOD 30 MIN: CPT | Performed by: FAMILY MEDICINE

## 2018-12-19 RX ORDER — BENAZEPRIL HYDROCHLORIDE 5 MG/1
5 TABLET, FILM COATED ORAL DAILY
Qty: 90 TABLET | Refills: 1 | Status: SHIPPED | OUTPATIENT
Start: 2018-12-19 | End: 2019-06-10 | Stop reason: SDUPTHER

## 2018-12-19 NOTE — ASSESSMENT & PLAN NOTE
Lab Results   Component Value Date    HGBA1C 6 7 (H) 12/14/2018       No results for input(s): POCGLU in the last 72 hours  Blood Sugar Average: Last 72 hrs:    A1c is up to 6 7  Diet discussed at length - he is going to stop eating doughnuts

## 2018-12-19 NOTE — PROGRESS NOTES
Assessment/Plan:    Problem List Items Addressed This Visit     Benign essential hypertension     Well controlled          Relevant Medications    benazepril (LOTENSIN) 5 mg tablet    Diabetic polyneuropathy associated with type 2 diabetes mellitus ([facility] [address] ) - Primary     Lab Results   Component Value Date    HGBA1C 6 7 (H) 12/14/2018       No results for input(s): POCGLU in the last 72 hours  Blood Sugar Average: Last 72 hrs:    A1c is up to 6 7  Diet discussed at length - he is going to stop eating doughnuts  Relevant Medications    benazepril (LOTENSIN) 5 mg tablet    Other Relevant Orders    Microalbumin / creatinine urine ratio    Hemoglobin A1C    Comprehensive metabolic panel    Mixed hyperlipidemia     Well controlled              BMI Counseling: Body mass index is 32 87 kg/m²  Discussed with patient's BMI with him  The BMI is above average  BMI counseling and education was provided to the patient  Nutrition recommendations include moderation in carbohydrate intake       Patient Instructions     Recent Results (from the past 672 hour(s))   Jareth Oconnor Default    Collection Time: 12/14/18  7:50 AM   Result Value Ref Range    White Blood Cell Count 6 8 3 4 - 10 8 x10E3/uL    Red Blood Cell Count 5 70 4 14 - 5 80 x10E6/uL    Hemoglobin 16 6 13 0 - 17 7 g/dL    HCT 49 4 37 5 - 51 0 %    MCV 87 79 - 97 fL    MCH 29 1 26 6 - 33 0 pg    MCHC 33 6 31 5 - 35 7 g/dL    RDW 13 9 12 3 - 15 4 %    Platelet Count 616 993 - 379 x10E3/uL    Neutrophils 61 Not Estab  %    Lymphocytes 28 Not Estab  %    Monocytes 7 Not Estab  %    Eosinophils 3 Not Estab  %    Basophils PCT 1 Not Estab  %    Neutrophils (Absolute) 4 1 1 4 - 7 0 x10E3/uL    Lymphocytes (Absolute) 1 9 0 7 - 3 1 x10E3/uL    Monocytes (Absolute) 0 5 0 1 - 0 9 x10E3/uL    Eosinophils (Absolute) 0 2 0 0 - 0 4 x10E3/uL    Basophils ABS 0 0 0 0 - 0 2 x10E3/uL    Immature Granulocytes 0 Not Estab  %    Immature Granulocytes (Absolute) 0 0 0 0 - 0 1 x10E3/uL Comprehensive metabolic panel    Collection Time: 12/14/18  7:50 AM   Result Value Ref Range    Glucose, Random 70 65 - 99 mg/dL    BUN 16 8 - 27 mg/dL    Creatinine 0 98 0 76 - 1 27 mg/dL    eGFR Non  79 >59 mL/min/1 73    SL AMB EGFR AFRICAN AMERICAN 92 >59 mL/min/1 73    SL AMB BUN/CREATININE RATIO 16 10 - 24    Sodium 140 134 - 144 mmol/L    Potassium 5 0 3 5 - 5 2 mmol/L    Chloride 101 96 - 106 mmol/L    CO2 26 20 - 29 mmol/L    CALCIUM 9 6 8 6 - 10 2 mg/dL    SL AMB PROTEIN, TOTAL 6 9 6 0 - 8 5 g/dL    Albumin 4 4 3 6 - 4 8 g/dL    Globulin, Total 2 5 1 5 - 4 5 g/dL    Albumin/Globulin Ratio 1 8 1 2 - 2 2    TOTAL BILIRUBIN 0 6 0 0 - 1 2 mg/dL    Alk Phos Isoenzymes 57 39 - 117 IU/L    SL AMB AST 19 0 - 40 IU/L    SL AMB ALT 22 0 - 44 IU/L   Lipid panel    Collection Time: 12/14/18  7:50 AM   Result Value Ref Range    Cholesterol, Total 163 100 - 199 mg/dL    Triglycerides 96 0 - 149 mg/dL    HDL 45 >39 mg/dL    LDL Direct 99 0 - 99 mg/dL   Hemoglobin A1c (w/out EAG)    Collection Time: 12/14/18  7:50 AM   Result Value Ref Range    Hemoglobin A1C 6 7 (H) 4 8 - 5 6 %   Cardiovascular Report    Collection Time: 12/14/18  7:50 AM   Result Value Ref Range    Interpretation Note    Specimen Status Report    Collection Time: 12/14/18  7:50 AM   Result Value Ref Range    Comment Comment            Return in about 3 months (around 3/19/2019) for Next scheduled follow up  Subjective:      Patient ID: Maik Daugherty is a 80 y o  male  Chief Complaint   Patient presents with    Hypertension    Diabetes    Hyperlipidemia     [facility]       He has been snacking more since his last visit  He has not had any high or low sugars  He did stoop his snacks 1 5 weeks ago  Hypertension   This is a chronic problem  The current episode started more than 1 year ago  The problem is controlled  Pertinent negatives include no peripheral edema  Past treatments include beta blockers   The current treatment provides moderate improvement  There are no compliance problems  Hyperlipidemia   This is a chronic problem  The current episode started more than 1 year ago  The problem is controlled  Recent lipid tests were reviewed and are normal  Pertinent negatives include no focal weakness  Current antihyperlipidemic treatment includes statins  The current treatment provides moderate improvement of lipids  The following portions of the patient's history were reviewed and updated as appropriate:  past social history    Review of Systems   Respiratory: Negative  Cardiovascular: Negative  Neurological: Negative for focal weakness  Current Outpatient Prescriptions   Medication Sig Dispense Refill    aspirin 325 mg tablet Take 325 mg by mouth daily   LUIS FERNANDO CONTOUR NEXT TEST test strip USE TO TEST BLOOD GLUCOSE ONCE DAILY 100 each 3    LUIS FERNANDO MICROLET LANCETS lancets Test glucose once daily DX: E11 42 100 each 3    clopidogrel (PLAVIX) 75 mg tablet Take 75 mg by mouth daily Indications: Heart Attack   gabapentin (NEURONTIN) 100 mg capsule TAKE ONE CAPSULE BY MOUTH TWICE A  capsule 1    gabapentin (NEURONTIN) 400 mg capsule TAKE ONE CAPSULE BY MOUTH FOUR TIMES A  capsule 1    glimepiride (AMARYL) 2 mg tablet TAKE ONE TABLET BY MOUTH EVERY DAY AS DIRECTED 90 tablet 1    metoprolol succinate (TOPROL-XL) 25 mg 24 hr tablet Take 25 mg by mouth daily Indications: High Blood Pressure  12 5 mg am / 12 5 mg pm      pravastatin (PRAVACHOL) 40 mg tablet TAKE ONE TABLET BY MOUTH EVERY DAY 90 tablet 1    benazepril (LOTENSIN) 5 mg tablet Take 1 tablet (5 mg total) by mouth daily 90 tablet 1     No current facility-administered medications for this visit          Objective:    /80   Pulse 62   Temp (!) 96 8 °F (36 °C)   Resp 16   Ht 5' 9 5" (1 765 m)   Wt 102 kg (225 lb 12 8 oz)   BMI 32 87 kg/m²        Physical Exam   Constitutional: He appears well-developed and well-nourished  HENT:   Head: Normocephalic and atraumatic  Right Ear: External ear normal    Left Ear: External ear normal    Mouth/Throat: Oropharynx is clear and moist    Cardiovascular: Normal rate, regular rhythm and normal heart sounds  No murmur heard  Pulmonary/Chest: Effort normal and breath sounds normal  No respiratory distress  He has no wheezes  He has no rales  Musculoskeletal: He exhibits no edema or tenderness  Psychiatric: He has a normal mood and affect  His behavior is normal  Thought content normal    Nursing note and vitals reviewed               Skinny Duong DO

## 2018-12-19 NOTE — PATIENT INSTRUCTIONS
Recent Results (from the past 672 hour(s))   Jareth Oconnor Default    Collection Time: 12/14/18  7:50 AM   Result Value Ref Range    White Blood Cell Count 6 8 3 4 - 10 8 x10E3/uL    Red Blood Cell Count 5 70 4 14 - 5 80 x10E6/uL    Hemoglobin 16 6 13 0 - 17 7 g/dL    HCT 49 4 37 5 - 51 0 %    MCV 87 79 - 97 fL    MCH 29 1 26 6 - 33 0 pg    MCHC 33 6 31 5 - 35 7 g/dL    RDW 13 9 12 3 - 15 4 %    Platelet Count 089 625 - 379 x10E3/uL    Neutrophils 61 Not Estab  %    Lymphocytes 28 Not Estab  %    Monocytes 7 Not Estab  %    Eosinophils 3 Not Estab  %    Basophils PCT 1 Not Estab  %    Neutrophils (Absolute) 4 1 1 4 - 7 0 x10E3/uL    Lymphocytes (Absolute) 1 9 0 7 - 3 1 x10E3/uL    Monocytes (Absolute) 0 5 0 1 - 0 9 x10E3/uL    Eosinophils (Absolute) 0 2 0 0 - 0 4 x10E3/uL    Basophils ABS 0 0 0 0 - 0 2 x10E3/uL    Immature Granulocytes 0 Not Estab  %    Immature Granulocytes (Absolute) 0 0 0 0 - 0 1 x10E3/uL   Comprehensive metabolic panel    Collection Time: 12/14/18  7:50 AM   Result Value Ref Range    Glucose, Random 70 65 - 99 mg/dL    BUN 16 8 - 27 mg/dL    Creatinine 0 98 0 76 - 1 27 mg/dL    eGFR Non  79 >59 mL/min/1 73    SL AMB EGFR AFRICAN AMERICAN 92 >59 mL/min/1 73    SL AMB BUN/CREATININE RATIO 16 10 - 24    Sodium 140 134 - 144 mmol/L    Potassium 5 0 3 5 - 5 2 mmol/L    Chloride 101 96 - 106 mmol/L    CO2 26 20 - 29 mmol/L    CALCIUM 9 6 8 6 - 10 2 mg/dL    SL AMB PROTEIN, TOTAL 6 9 6 0 - 8 5 g/dL    Albumin 4 4 3 6 - 4 8 g/dL    Globulin, Total 2 5 1 5 - 4 5 g/dL    Albumin/Globulin Ratio 1 8 1 2 - 2 2    TOTAL BILIRUBIN 0 6 0 0 - 1 2 mg/dL    Alk Phos Isoenzymes 57 39 - 117 IU/L    SL AMB AST 19 0 - 40 IU/L    SL AMB ALT 22 0 - 44 IU/L   Lipid panel    Collection Time: 12/14/18  7:50 AM   Result Value Ref Range    Cholesterol, Total 163 100 - 199 mg/dL    Triglycerides 96 0 - 149 mg/dL    HDL 45 >39 mg/dL    LDL Direct 99 0 - 99 mg/dL   Hemoglobin A1c (w/out EAG)    Collection Time: 12/14/18  7:50 AM   Result Value Ref Range    Hemoglobin A1C 6 7 (H) 4 8 - 5 6 %   Cardiovascular Report    Collection Time: 12/14/18  7:50 AM   Result Value Ref Range    Interpretation Note    Specimen Status Report    Collection Time: 12/14/18  7:50 AM   Result Value Ref Range    Comment Comment

## 2019-01-08 ENCOUNTER — OFFICE VISIT (OUTPATIENT)
Dept: PODIATRY | Facility: CLINIC | Age: 69
End: 2019-01-08
Payer: MEDICARE

## 2019-01-08 VITALS
DIASTOLIC BLOOD PRESSURE: 84 MMHG | HEIGHT: 70 IN | WEIGHT: 225 LBS | SYSTOLIC BLOOD PRESSURE: 146 MMHG | BODY MASS INDEX: 32.21 KG/M2

## 2019-01-08 DIAGNOSIS — L60.0 INGROWN TOENAIL: Primary | ICD-10-CM

## 2019-01-08 DIAGNOSIS — M79.671 PAIN IN BOTH FEET: ICD-10-CM

## 2019-01-08 DIAGNOSIS — M79.672 PAIN IN BOTH FEET: ICD-10-CM

## 2019-01-08 DIAGNOSIS — R20.2 PARESTHESIA OF BOTH FEET: ICD-10-CM

## 2019-01-08 DIAGNOSIS — E11.42 DIABETIC POLYNEUROPATHY ASSOCIATED WITH TYPE 2 DIABETES MELLITUS (HCC): ICD-10-CM

## 2019-01-08 PROCEDURE — 99213 OFFICE O/P EST LOW 20 MIN: CPT | Performed by: PODIATRIST

## 2019-01-08 NOTE — PROGRESS NOTES
Assessment/Plan:       discussed possible need for increasing dose of gabapentin   discussed vitamin supplements and more exercise   discussed importance of blood sugar control  Aseptic debridement and planning of nails x10 and manually and mechanically     follow-up 2 months     Diagnoses and all orders for this visit:    Ingrown toenail    Paresthesia of both feet    Diabetic polyneuropathy associated with type 2 diabetes mellitus (HCC)    Pain in both feet          Subjective:      Patient ID: Luis Linn is a 80 y o  male  Patient  Has been taking gabapentin 400 mg twice a day  Says he has significant improvement in burning and   Pain in feet  Still having some pain and tingling mostly at night rates pain between 2 and 4/10 on a pain scale  Patient does get recurrent ingrown nails bilateral great toes  Patient has not noticed any redness or signs of infection  Past Medical History:   Diagnosis Date    Acquired ankle/foot deformity     last assessed: 05/30/2017    Acute myocardial infarction [facility])     last assessed: 12/03/2013    Anemia     last assessed: 12/03/2013    Anxiety     last assessed: 12/03/2013    Cardiomyopathy ([facility] )     last assessed: 12/03/2013    Congestive heart failure (CHF) ([facility] )     onset: 02/29/2012    Coronary artery disease     Diabetes mellitus ([facility] )     Dysesthesia     last assessed: 09/10/2014    Exposure to hepatitis     last assessed: 11/15/2016    Foot slap     last assessed: 03/17/2016    Hypertension     Myocardial infarction [facility])     Palsy of left sciatic nerve     last assessed: 03/17/2016    Subconjunctival hemorrhage     last assessed: 03/03/2014    Xerosis cutis     last assessed: 10/25/2016       Past Surgical History:   Procedure Laterality Date    ANGIOPLASTY      2 coronary stents    CHOLECYSTECTOMY      COLONOSCOPY N/A 3/17/2016    Procedure: COLONOSCOPY;  Surgeon: Sarthak Schrader MD;  Location: [place] [address] GI LAB;   Service:     HERNIA REPAIR      right inguinal        No Known Allergies      Current Outpatient Prescriptions:     aspirin 325 mg tablet, Take 325 mg by mouth daily  , Disp: , Rfl:     LUIS FERNANDO CONTOUR NEXT TEST test strip, USE TO TEST BLOOD GLUCOSE ONCE DAILY, Disp: 100 each, Rfl: 3    LUIS FERNANDO MICROLET LANCETS lancets, Test glucose once daily DX: E11 42, Disp: 100 each, Rfl: 3    benazepril (LOTENSIN) 5 mg tablet, Take 1 tablet (5 mg total) by mouth daily, Disp: 90 tablet, Rfl: 1    clopidogrel (PLAVIX) 75 mg tablet, Take 75 mg by mouth daily Indications: Heart Attack  , Disp: , Rfl:     gabapentin (NEURONTIN) 100 mg capsule, TAKE ONE CAPSULE BY MOUTH TWICE A DAY, Disp: 180 capsule, Rfl: 1    gabapentin (NEURONTIN) 400 mg capsule, TAKE ONE CAPSULE BY MOUTH FOUR TIMES A DAY, Disp: 360 capsule, Rfl: 1    glimepiride (AMARYL) 2 mg tablet, TAKE ONE TABLET BY MOUTH EVERY DAY AS DIRECTED, Disp: 90 tablet, Rfl: 1    metoprolol succinate (TOPROL-XL) 25 mg 24 hr tablet, Take 25 mg by mouth daily Indications: High Blood Pressure  12 5 mg am / 12 5 mg pm, Disp: , Rfl:     pravastatin (PRAVACHOL) 40 mg tablet, TAKE ONE TABLET BY MOUTH EVERY DAY, Disp: 90 tablet, Rfl: 1    Patient Active Problem List   Diagnosis    Benign essential hypertension    Diabetic polyneuropathy associated with type 2 diabetes mellitus (HCC)    Mixed hyperlipidemia    Impotence    Myocardial infarction (HCC)    Nerve palsy    Onychomycosis    Peripheral neuropathy    Reflex sympathetic dystrophy    Tabes dorsalis    Atherosclerosis of arteries of extremities (HCC)       Review of Systems   Constitutional: Negative  HENT: Negative  Eyes: Negative  Respiratory: Negative  Cardiovascular: Negative  Gastrointestinal: Negative  Endocrine: Negative  Genitourinary: Negative  Musculoskeletal: Negative  Skin: Negative  Allergic/Immunologic: Negative  Neurological: Negative  Hematological: Negative      Psychiatric/Behavioral: Negative  Objective:      /84   Ht 5' 9 5" (1 765 m)   Wt 102 kg (225 lb)   BMI 32 75 kg/m²          Physical Exam   Cardiovascular: Pulses are weak pulses  Pulses:       Dorsalis pedis pulses are 1+ on the right side, and 1+ on the left side  Posterior tibial pulses are 1+ on the right side, and 1+ on the left side  Feet:   Right Foot:   Skin Integrity: Positive for callus and dry skin  Left Foot:   Skin Integrity: Positive for callus and dry skin  Patient's shoes and socks removed  Right Foot/Ankle   Right Foot Inspection  Skin Exam: dry skin, callus and callus                            Sensory   Vibration: absent  Proprioception: diminished   Monofilament testing: diminished  Vascular  Capillary refills: elevated  The right DP pulse is 1+  The right PT pulse is 1+  Left Foot/Ankle  Left Foot Inspection  Skin Exam: dry skin and callus                                         Sensory   Vibration: absent  Proprioception: diminished  Monofilament: diminished  Vascular  Capillary refills: elevated  The left DP pulse is 1+  The left PT pulse is 1+  Assign Risk Category:  Deformity present; Loss of protective sensation; Weak pulses       Risk: 2       Constitutional: no acute distress, well appearing and well nourished       Orthopedic/Biomechanical: ingrown nails       Skin: Left hallux, decreased erythema decreased exudate, resolving, infection       Neurologic: decreased vibration sensation       Psychiatric: oriented to person, place, and time     Left Foot: Appearance: Normal except as noted: excessive pronation-- and-- pes planus  Great toe deformities include a bunion  Second toe deformities include hammer toe  Third toe deformities include hammer toe  Forth toe deformities include hammer toe  Fifth toe deformities include hammer toe  Evaluation of the great toe nail demonstrates paronychia-- and-- an ingrown nail   Evaluation of the fourth toe nail demonstrates subungual hematoma  Monia Le +1 pitting edema bilateral feet and ankles negative calf tenderness negative Homans sign  ROM: Full  Special Tests: Negative erythema, mild edema, no signs of infection no open wounds     Right Foot: Appearance: Normal except as noted: excessive pronation-- and-- pes planus  Second toe deformities include hammer toe  Third toe deformities include hammer toe  Forth toe deformities include hammer toe  Fifth toe deformities include hammer toe  Left Ankle: ROM: limited ROM in all planes Motor: diffuse weakness     Right Ankle: ROM: limited ROM in all planes Motor: diffuse weakness     Neurological Exam: performed  Light touch was intact bilaterally  Vibratory sensation was decreased in the right ankle-- and-- decreased in the left first metatarsophalangeal joint  Response to monofilament test was intact bilaterally     Hyperkeratosis: present on both second toes,-- present on the right second toe,-- present on both first sub metatarsals-- and-- present on both fifth sub metatarsals     Shoe Gear Evaluation: performed ()  Right Foot: width: e-- and-- length: 11  Left Foot: width: e-- and-- length: 11  Recommendation(s): extra depth diabetic shoes-- and-- SAS style  Preulcerative lesions on distal aspect of the 2nd digit right worse than left  Skin is hairless and atrophic  Negative calf tenderness mild edema  No signs of infection    Negative Tinel sign tarsal tunnel bilateral  Muscle strength 5/5 all groups bilateral feet and ankles  Significantly reduced sensation bilateral   Negative erythema no signs of infection          Foot Exam    General  General Appearance: appears stated age and healthy   Orientation: alert and oriented to person, place, and time   Affect: appropriate   Gait: unimpaired       Right Foot/Ankle     Inspection and Palpation  Skin Exam: callus and dry skin;     Neurovascular  Dorsalis pedis: 1+  Posterior tibial: 1+      Left Foot/Ankle      Inspection and Palpation  Skin Exam: callus and dry skin;     Neurovascular  Dorsalis pedis: 1+  Posterior tibial: 1+            Nail is thickened dystrophic  Ingrown medial border right hallux    Mild swelling no paronychia no erythema positive pain on palpation no exudate   moderate edema bilateral feet and  ankles   moderate xerosis   negative Tinel sign tarsal tunnel bilateral   muscle strength 5/5 all groups bilateral feet and ankles

## 2019-02-18 DIAGNOSIS — E78.2 MIXED HYPERLIPIDEMIA: ICD-10-CM

## 2019-02-18 RX ORDER — PRAVASTATIN SODIUM 40 MG
TABLET ORAL
Qty: 90 TABLET | Refills: 1 | Status: SHIPPED | OUTPATIENT
Start: 2019-02-18 | End: 2019-04-10 | Stop reason: ALTCHOICE

## 2019-02-22 DIAGNOSIS — G60.9 IDIOPATHIC PERIPHERAL NEUROPATHY: ICD-10-CM

## 2019-02-22 RX ORDER — GABAPENTIN 400 MG/1
400 CAPSULE ORAL 4 TIMES DAILY
Qty: 360 CAPSULE | Refills: 1 | Status: SHIPPED | OUTPATIENT
Start: 2019-02-22 | End: 2019-08-20 | Stop reason: SDUPTHER

## 2019-02-22 RX ORDER — GABAPENTIN 100 MG/1
CAPSULE ORAL
Qty: 180 CAPSULE | Refills: 1 | Status: SHIPPED | OUTPATIENT
Start: 2019-02-22 | End: 2019-08-20 | Stop reason: SDUPTHER

## 2019-02-22 NOTE — TELEPHONE ENCOUNTER
Before I can refill his gabapentin dose please call Laura Méndez and confirm what he is taking    Dr Thad Davis

## 2019-02-22 NOTE — TELEPHONE ENCOUNTER
Pt states taking 400mg 4 times daily and 100mg 2 times daily   Amanda Jessica, [address] [place]

## 2019-03-06 ENCOUNTER — TELEPHONE (OUTPATIENT)
Dept: FAMILY MEDICINE CLINIC | Facility: CLINIC | Age: 69
End: 2019-03-06

## 2019-03-06 DIAGNOSIS — I21.9 MYOCARDIAL INFARCTION, UNSPECIFIED MI TYPE, UNSPECIFIED ARTERY (HCC): Primary | ICD-10-CM

## 2019-03-06 NOTE — TELEPHONE ENCOUNTER
Pt would like Dr Arelis Cassidy to call him back  His Cardiologist left practice and they were told to either come to their primary to manage their meds and so forth or go to a new Cardiologist  Please call him back to let him know what Dr Koehler would prefer him to do?

## 2019-03-07 NOTE — TELEPHONE ENCOUNTER
3/7/2019 11:32 AM Returned call to Hank Rogers  His doctor was with the [facility]  They are no longer going to have an office in [place]  Due to insurance reasons he must see doctors that are in state  Discussed his current medication  I told him that I would refill his clopidogrel and metoprolol for him  Though I do think it would be marie for him to follow up with a cardiologist since he has a history of a myocardial infarction  He did agree with that and is going to check with his insurance regarding local cardiologist   I did recommend that he see Dr Archana Jeffries  He is going to check with his insurance regarding the status of him in his network  He has an appoint with me this month  He will follow up as appointment and we will discuss cardiologist at that time      Message berny Gooden, DO

## 2019-03-16 LAB
ALBUMIN SERPL-MCNC: 4.6 G/DL (ref 3.6–4.8)
ALBUMIN/CREAT UR: 3.8 MG/G CREAT (ref 0–30)
ALBUMIN/GLOB SERPL: 1.8 {RATIO} (ref 1.2–2.2)
ALP SERPL-CCNC: 58 IU/L (ref 39–117)
ALT SERPL-CCNC: 22 IU/L (ref 0–44)
AST SERPL-CCNC: 21 IU/L (ref 0–40)
BILIRUB SERPL-MCNC: 0.5 MG/DL (ref 0–1.2)
BUN SERPL-MCNC: 13 MG/DL (ref 8–27)
BUN/CREAT SERPL: 13 (ref 10–24)
CALCIUM SERPL-MCNC: 9.8 MG/DL (ref 8.6–10.2)
CHLORIDE SERPL-SCNC: 101 MMOL/L (ref 96–106)
CO2 SERPL-SCNC: 25 MMOL/L (ref 20–29)
CREAT SERPL-MCNC: 1.02 MG/DL (ref 0.76–1.27)
CREAT UR-MCNC: 106 MG/DL
GLOBULIN SER-MCNC: 2.6 G/DL (ref 1.5–4.5)
GLUCOSE SERPL-MCNC: 82 MG/DL (ref 65–99)
HBA1C MFR BLD: 6.5 % (ref 4.8–5.6)
LABCORP COMMENT: NORMAL
MICROALBUMIN UR-MCNC: 4 UG/ML
POTASSIUM SERPL-SCNC: 4.8 MMOL/L (ref 3.5–5.2)
PROT SERPL-MCNC: 7.2 G/DL (ref 6–8.5)
SL AMB EGFR AFRICAN AMERICAN: 88 ML/MIN/1.73
SL AMB EGFR NON AFRICAN AMERICAN: 76 ML/MIN/1.73
SODIUM SERPL-SCNC: 141 MMOL/L (ref 134–144)

## 2019-03-19 ENCOUNTER — OFFICE VISIT (OUTPATIENT)
Dept: FAMILY MEDICINE CLINIC | Facility: CLINIC | Age: 69
End: 2019-03-19
Payer: MEDICARE

## 2019-03-19 VITALS
TEMPERATURE: 97 F | RESPIRATION RATE: 16 BRPM | HEIGHT: 70 IN | DIASTOLIC BLOOD PRESSURE: 78 MMHG | SYSTOLIC BLOOD PRESSURE: 130 MMHG | WEIGHT: 222 LBS | BODY MASS INDEX: 31.78 KG/M2 | HEART RATE: 64 BPM

## 2019-03-19 DIAGNOSIS — E78.2 MIXED HYPERLIPIDEMIA: ICD-10-CM

## 2019-03-19 DIAGNOSIS — E11.42 DIABETIC POLYNEUROPATHY ASSOCIATED WITH TYPE 2 DIABETES MELLITUS (HCC): ICD-10-CM

## 2019-03-19 DIAGNOSIS — I21.9 MYOCARDIAL INFARCTION, UNSPECIFIED MI TYPE, UNSPECIFIED ARTERY (HCC): ICD-10-CM

## 2019-03-19 DIAGNOSIS — I10 BENIGN ESSENTIAL HYPERTENSION: Primary | ICD-10-CM

## 2019-03-19 PROCEDURE — 99214 OFFICE O/P EST MOD 30 MIN: CPT | Performed by: FAMILY MEDICINE

## 2019-03-19 NOTE — PATIENT INSTRUCTIONS
Recent Results (from the past 672 hour(s))   Comprehensive metabolic panel    Collection Time: 03/15/19  7:52 AM   Result Value Ref Range    Glucose, Random 82 65 - 99 mg/dL    BUN 13 8 - 27 mg/dL    Creatinine 1 02 0 76 - 1 27 mg/dL    eGFR Non  76 >59 mL/min/1 73    eGFR  88 >59 mL/min/1 73    SL AMB BUN/CREATININE RATIO 13 10 - 24    Sodium 141 134 - 144 mmol/L    Potassium 4 8 3 5 - 5 2 mmol/L    Chloride 101 96 - 106 mmol/L    CO2 25 20 - 29 mmol/L    CALCIUM 9 8 8 6 - 10 2 mg/dL    Protein, Total 7 2 6 0 - 8 5 g/dL    Albumin 4 6 3 6 - 4 8 g/dL    Globulin, Total 2 6 1 5 - 4 5 g/dL    Albumin/Globulin Ratio 1 8 1 2 - 2 2    TOTAL BILIRUBIN 0 5 0 0 - 1 2 mg/dL    Alk Phos Isoenzymes 58 39 - 117 IU/L    AST 21 0 - 40 IU/L    ALT 22 0 - 44 IU/L   Microalbumin / creatinine urine ratio    Collection Time: 03/15/19  7:52 AM   Result Value Ref Range    Creatinine, Urine 106 0 Not Estab  mg/dL    Microalbum  ,U,Random 4 0 Not Estab  ug/mL    Microalb/Creat Ratio 3 8 0 0 - 30 0 mg/g creat   Hemoglobin A1c (w/out EAG)    Collection Time: 03/15/19  7:52 AM   Result Value Ref Range    Hemoglobin A1C 6 5 (H) 4 8 - 5 6 %   Specimen Status Report    Collection Time: 03/15/19  7:52 AM   Result Value Ref Range    Comment Comment

## 2019-03-19 NOTE — PROGRESS NOTES
Assessment/Plan:    Problem List Items Addressed This Visit     Benign essential hypertension - Primary     Well controlled  Continue benazepril 5 mg and metoprolol 25 mg a day         Relevant Orders    Comprehensive metabolic panel    Lipid Panel with Direct LDL reflex    Diabetic polyneuropathy associated with type 2 diabetes mellitus ([address] )     Lab Results   Component Value Date    HGBA1C 6 5 (H) 03/15/2019       No results for input(s): POCGLU in the last 72 hours  Blood Sugar Average: Last 72 hrs:    A1c has improved           Relevant Orders    Hemoglobin A1C    Mixed hyperlipidemia     Stable   Continue pravastatin 40 mg         Relevant Orders    Comprehensive metabolic panel    Lipid Panel with Direct LDL reflex    Myocardial infarction [facility])    Relevant Orders    Ambulatory referral to Cardiology          BMI Counseling: Body mass index is 32 31 kg/m²  Discussed with patient's BMI with him  The BMI is above average  BMI counseling and education was provided to the patient  Nutrition recommendations include 3-5 servings of fruits/vegetables daily        Patient Instructions     Recent Results (from the past 672 hour(s))   Comprehensive metabolic panel    Collection Time: 03/15/19  7:52 AM   Result Value Ref Range    Glucose, Random 82 65 - 99 mg/dL    BUN 13 8 - 27 mg/dL    Creatinine 1 02 0 76 - 1 27 mg/dL    eGFR Non  76 >59 mL/min/1 73    eGFR  88 >59 mL/min/1 73    SL AMB BUN/CREATININE RATIO 13 10 - 24    Sodium 141 134 - 144 mmol/L    Potassium 4 8 3 5 - 5 2 mmol/L    Chloride 101 96 - 106 mmol/L    CO2 25 20 - 29 mmol/L    CALCIUM 9 8 8 6 - 10 2 mg/dL    Protein, Total 7 2 6 0 - 8 5 g/dL    Albumin 4 6 3 6 - 4 8 g/dL    Globulin, Total 2 6 1 5 - 4 5 g/dL    Albumin/Globulin Ratio 1 8 1 2 - 2 2    TOTAL BILIRUBIN 0 5 0 0 - 1 2 mg/dL    Alk Phos Isoenzymes 58 39 - 117 IU/L    AST 21 0 - 40 IU/L    ALT 22 0 - 44 IU/L   Microalbumin / creatinine urine ratio    Collection Time: 03/15/19  7:52 AM   Result Value Ref Range    Creatinine, Urine 106 0 Not Estab  mg/dL    Microalbum  ,U,Random 4 0 Not Estab  ug/mL    Microalb/Creat Ratio 3 8 0 0 - 30 0 mg/g creat   Hemoglobin A1c (w/out EAG)    Collection Time: 03/15/19  7:52 AM   Result Value Ref Range    Hemoglobin A1C 6 5 (H) 4 8 - 5 6 %   Specimen Status Report    Collection Time: 03/15/19  7:52 AM   Result Value Ref Range    Comment Comment            Return in about 3 months (around 6/21/2019) for Annual Wellness Visit  Subjective:      Patient ID: Armaan Young is a 80 y o  male  Chief Complaint   Patient presents with    Follow-up     3 month f/u prcma       He is staying active  Hypertension   This is a chronic problem  The current episode started more than 1 year ago  The problem is controlled  Past treatments include beta blockers and ACE inhibitors  The current treatment provides moderate improvement  There are no compliance problems  The following portions of the patient's history were reviewed and updated as appropriate:  past social history    Review of Systems   Respiratory: Negative  Cardiovascular: Negative  Current Outpatient Medications   Medication Sig Dispense Refill    aspirin 325 mg tablet Take 325 mg by mouth daily   LUIS FERNANDO CONTOUR NEXT TEST test strip USE TO TEST BLOOD GLUCOSE ONCE DAILY 100 each 3    LUIS FERNANDO MICROLET LANCETS lancets Test glucose once daily DX: E11 42 100 each 3    benazepril (LOTENSIN) 5 mg tablet Take 1 tablet (5 mg total) by mouth daily 90 tablet 1    clopidogrel (PLAVIX) 75 mg tablet Take 75 mg by mouth daily Indications: Heart Attack        gabapentin (NEURONTIN) 100 mg capsule TAKE ONE CAPSULE BY MOUTH TWICE A  capsule 1    gabapentin (NEURONTIN) 400 mg capsule Take 1 capsule (400 mg total) by mouth 4 (four) times a day 360 capsule 1    glimepiride (AMARYL) 2 mg tablet TAKE ONE TABLET BY MOUTH EVERY DAY AS DIRECTED 90 tablet 1    metoprolol succinate (TOPROL-XL) 25 mg 24 hr tablet Take 25 mg by mouth daily Indications: High Blood Pressure  12 5 mg am / 12 5 mg pm      pravastatin (PRAVACHOL) 40 mg tablet TAKE ONE TABLET BY MOUTH EVERY DAY 90 tablet 1     No current facility-administered medications for this visit  Objective:    /78   Pulse 64   Temp (!) 97 °F (36 1 °C)   Resp 16   Ht 5' 9 5" (1 765 m)   Wt 101 kg (222 lb)   BMI 32 31 kg/m²        Physical Exam   Constitutional: He appears well-developed and well-nourished  HENT:   Head: Normocephalic and atraumatic  Right Ear: External ear normal    Left Ear: External ear normal    Mouth/Throat: Oropharynx is clear and moist    Cardiovascular: Normal rate, regular rhythm and normal heart sounds  No murmur heard  Pulmonary/Chest: Effort normal and breath sounds normal  No respiratory distress  He has no wheezes  He has no rales  Musculoskeletal: He exhibits no edema or tenderness  Nursing note and vitals reviewed               Fredi Hoyos DO

## 2019-03-19 NOTE — ASSESSMENT & PLAN NOTE
Lab Results   Component Value Date    HGBA1C 6 5 (H) 03/15/2019       No results for input(s): POCGLU in the last 72 hours      Blood Sugar Average: Last 72 hrs:    A1c has improved

## 2019-03-26 ENCOUNTER — OFFICE VISIT (OUTPATIENT)
Dept: FAMILY MEDICINE CLINIC | Facility: CLINIC | Age: 69
End: 2019-03-26
Payer: MEDICARE

## 2019-03-26 VITALS
HEART RATE: 80 BPM | BODY MASS INDEX: 31.92 KG/M2 | WEIGHT: 223 LBS | TEMPERATURE: 100 F | RESPIRATION RATE: 16 BRPM | HEIGHT: 70 IN | DIASTOLIC BLOOD PRESSURE: 80 MMHG | SYSTOLIC BLOOD PRESSURE: 140 MMHG

## 2019-03-26 DIAGNOSIS — J01.00 ACUTE NON-RECURRENT MAXILLARY SINUSITIS: Primary | ICD-10-CM

## 2019-03-26 DIAGNOSIS — I10 BENIGN ESSENTIAL HYPERTENSION: ICD-10-CM

## 2019-03-26 DIAGNOSIS — E11.42 DIABETIC POLYNEUROPATHY ASSOCIATED WITH TYPE 2 DIABETES MELLITUS (HCC): ICD-10-CM

## 2019-03-26 PROCEDURE — 99214 OFFICE O/P EST MOD 30 MIN: CPT | Performed by: FAMILY MEDICINE

## 2019-03-26 RX ORDER — AMOXICILLIN 875 MG/1
875 TABLET, COATED ORAL 2 TIMES DAILY
Qty: 20 TABLET | Refills: 0 | Status: SHIPPED | OUTPATIENT
Start: 2019-03-26 | End: 2019-04-05

## 2019-03-26 NOTE — PATIENT INSTRUCTIONS
Over-the-counter products can be useful for your symptoms:                      <<GUAIFENESIN>> is an expectorant that is useful for thick mucus  Often found in Robitussin and Mucinex products  <<DEXTROMETHORPHAN>> is a cough suppressant that works in the brain   to help reduce bothersome cough  Use with caution with other medications that work in the brain  <<ANTI-HISTAMINES>> are useful as allergy medications and to help dry up   bothersome thin secretions  Less sedating options include   Claritin, Zyrtec, Allegra and Xyzal and have generic OTC forms  <<PSEUDOEPHEDRINE and PHENYLEPHRINE>> are stimulant decongestants   that can be used for congestion and sinus pressure  Avoid or use with caution with high blood pressure or heart conditions  <<NASAL SPRAYS>> Steroid nasal sprays (flonase, nasacort) are used for allergies but   can be used for congestion also  Safe for high blood pressure  Afrin is a decongestant that works quickly but for up to 3 days        mucinex DM is a suggestion today  flonase can be used also for congestion (spray)

## 2019-03-26 NOTE — PROGRESS NOTES
Assessment/Plan:    No problem-specific Assessment & Plan notes found for this encounter  Sinusitis new  htn stable  otc advised also  DM stable with a1c 6 5     Diagnoses and all orders for this visit:    Acute non-recurrent maxillary sinusitis  -     amoxicillin (AMOXIL) 875 mg tablet; Take 1 tablet (875 mg total) by mouth 2 (two) times a day for 10 days    Benign essential hypertension    Diabetic polyneuropathy associated with type 2 diabetes mellitus ([address] )              Return if symptoms worsen or fail to improve  Subjective:      Patient ID: Ai Peña is a 80 y o  male  Chief Complaint   Patient presents with    Headache    Earache     clogged    Nasal Congestion     -wmcma       HPI   Headache, nasal congestion, nasal dc white, creamy, ears clogged, lips dry, no sore throat, about 4d, bad at night, had fever, no sick contacts, [place] exposure  Goes to [place] every day  No sob  No cp  No otc except advil    The following portions of the patient's history were reviewed and updated as appropriate: allergies, current medications, past family history, past medical history, past social history, past surgical history and problem list     Review of Systems   Respiratory: Negative for shortness of breath  Cardiovascular: Negative for chest pain  Current Outpatient Medications   Medication Sig Dispense Refill    aspirin 325 mg tablet Take 325 mg by mouth daily   LUIS FERNANDO CONTOUR NEXT TEST test strip USE TO TEST BLOOD GLUCOSE ONCE DAILY 100 each 3    LUIS FERNANDO MICROLET LANCETS lancets Test glucose once daily DX: E11 42 100 each 3    benazepril (LOTENSIN) 5 mg tablet Take 1 tablet (5 mg total) by mouth daily 90 tablet 1    clopidogrel (PLAVIX) 75 mg tablet Take 75 mg by mouth daily Indications: Heart Attack        gabapentin (NEURONTIN) 100 mg capsule TAKE ONE CAPSULE BY MOUTH TWICE A  capsule 1    gabapentin (NEURONTIN) 400 mg capsule Take 1 capsule (400 mg total) by mouth 4 (four) times a day 360 capsule 1    glimepiride (AMARYL) 2 mg tablet TAKE ONE TABLET BY MOUTH EVERY DAY AS DIRECTED 90 tablet 1    metoprolol succinate (TOPROL-XL) 25 mg 24 hr tablet Take 25 mg by mouth daily Indications: High Blood Pressure  12 5 mg am / 12 5 mg pm      pravastatin (PRAVACHOL) 40 mg tablet TAKE ONE TABLET BY MOUTH EVERY DAY 90 tablet 1    amoxicillin (AMOXIL) 875 mg tablet Take 1 tablet (875 mg total) by mouth 2 (two) times a day for 10 days 20 tablet 0     No current facility-administered medications for this visit  Objective:    /80   Pulse 80   Temp 100 °F (37 8 °C)   Resp 16   Ht 5' 9 5" (1 765 m)   Wt 101 kg (223 lb)   BMI 32 46 kg/m²        Physical Exam   Constitutional: He appears well-developed  No distress  HENT:   Head: Normocephalic  Right Ear: External ear normal    Left Ear: External ear normal    Nose: Nose normal    Mouth/Throat: Oropharynx is clear and moist  No oropharyngeal exudate  Sinuses tender to percussion, nasal turbinates visualized and appear red and swollen     Eyes: Conjunctivae are normal  No scleral icterus  Neck: Neck supple  Cardiovascular: Normal rate, regular rhythm, normal heart sounds and intact distal pulses  No murmur heard  Pulmonary/Chest: Effort normal  No respiratory distress  He has no wheezes  Abdominal: Soft  There is no tenderness  There is no guarding  Musculoskeletal: He exhibits no edema or deformity  Lymphadenopathy:     He has no cervical adenopathy  Neurological: He is alert  He exhibits normal muscle tone  Skin: Skin is warm and dry  No rash noted  No pallor  Psychiatric: His behavior is normal  Thought content normal    Nursing note and vitals reviewed               Manuel Flores DO

## 2019-04-05 ENCOUNTER — TELEPHONE (OUTPATIENT)
Dept: FAMILY MEDICINE CLINIC | Facility: CLINIC | Age: 69
End: 2019-04-05

## 2019-04-05 DIAGNOSIS — J01.00 ACUTE NON-RECURRENT MAXILLARY SINUSITIS: Primary | ICD-10-CM

## 2019-04-05 RX ORDER — AZITHROMYCIN 250 MG/1
TABLET, FILM COATED ORAL
Qty: 6 TABLET | Refills: 0 | Status: SHIPPED | OUTPATIENT
Start: 2019-04-05 | End: 2019-04-10

## 2019-04-10 ENCOUNTER — OFFICE VISIT (OUTPATIENT)
Dept: CARDIOLOGY CLINIC | Facility: CLINIC | Age: 69
End: 2019-04-10
Payer: MEDICARE

## 2019-04-10 VITALS
DIASTOLIC BLOOD PRESSURE: 84 MMHG | OXYGEN SATURATION: 96 % | WEIGHT: 223 LBS | SYSTOLIC BLOOD PRESSURE: 156 MMHG | HEART RATE: 55 BPM | HEIGHT: 70 IN | BODY MASS INDEX: 31.92 KG/M2

## 2019-04-10 DIAGNOSIS — I25.10 CORONARY ARTERY DISEASE INVOLVING NATIVE CORONARY ARTERY OF NATIVE HEART WITHOUT ANGINA PECTORIS: Primary | ICD-10-CM

## 2019-04-10 DIAGNOSIS — E78.2 MIXED HYPERLIPIDEMIA: ICD-10-CM

## 2019-04-10 DIAGNOSIS — I25.2 HISTORY OF MYOCARDIAL INFARCTION IN ADULTHOOD: ICD-10-CM

## 2019-04-10 DIAGNOSIS — I10 ESSENTIAL HYPERTENSION: ICD-10-CM

## 2019-04-10 DIAGNOSIS — E11.42 DIABETIC POLYNEUROPATHY ASSOCIATED WITH TYPE 2 DIABETES MELLITUS (HCC): ICD-10-CM

## 2019-04-10 PROCEDURE — 99203 OFFICE O/P NEW LOW 30 MIN: CPT | Performed by: INTERNAL MEDICINE

## 2019-04-10 PROCEDURE — 93000 ELECTROCARDIOGRAM COMPLETE: CPT | Performed by: INTERNAL MEDICINE

## 2019-04-10 RX ORDER — ASPIRIN 81 MG/1
81 TABLET ORAL DAILY
Qty: 30 TABLET | Refills: 5 | Status: SHIPPED | COMMUNITY
Start: 2019-04-10

## 2019-04-10 RX ORDER — ATORVASTATIN CALCIUM 40 MG/1
40 TABLET, FILM COATED ORAL DAILY
Qty: 90 TABLET | Refills: 0
Start: 2019-04-10 | End: 2019-05-23 | Stop reason: SDUPTHER

## 2019-04-22 ENCOUNTER — HOSPITAL ENCOUNTER (OUTPATIENT)
Dept: NON INVASIVE DIAGNOSTICS | Facility: HOSPITAL | Age: 69
Discharge: HOME | End: 2019-04-22
Attending: INTERNAL MEDICINE
Payer: MEDICARE

## 2019-04-22 DIAGNOSIS — I25.10 CORONARY ARTERY DISEASE INVOLVING NATIVE CORONARY ARTERY OF NATIVE HEART WITHOUT ANGINA PECTORIS: ICD-10-CM

## 2019-04-22 PROCEDURE — 93017 CV STRESS TEST TRACING ONLY: CPT

## 2019-04-23 ENCOUNTER — OFFICE VISIT (OUTPATIENT)
Dept: PODIATRY | Facility: CLINIC | Age: 69
End: 2019-04-23
Payer: MEDICARE

## 2019-04-23 VITALS
SYSTOLIC BLOOD PRESSURE: 155 MMHG | BODY MASS INDEX: 31.92 KG/M2 | WEIGHT: 223 LBS | DIASTOLIC BLOOD PRESSURE: 95 MMHG | HEIGHT: 70 IN

## 2019-04-23 DIAGNOSIS — R20.2 PARESTHESIA: ICD-10-CM

## 2019-04-23 DIAGNOSIS — M79.672 PAIN IN BOTH FEET: ICD-10-CM

## 2019-04-23 DIAGNOSIS — E11.42 DIABETIC POLYNEUROPATHY ASSOCIATED WITH TYPE 2 DIABETES MELLITUS (HCC): Primary | ICD-10-CM

## 2019-04-23 DIAGNOSIS — I70.209 ATHEROSCLEROSIS OF ARTERIES OF EXTREMITIES (HCC): ICD-10-CM

## 2019-04-23 DIAGNOSIS — M79.671 PAIN IN BOTH FEET: ICD-10-CM

## 2019-04-23 DIAGNOSIS — B35.1 ONYCHOMYCOSIS: ICD-10-CM

## 2019-04-23 LAB
CHEST PAIN STATEMENT: NORMAL
MAX DIASTOLIC BP: 84 MMHG
MAX HEART RATE: 144 BPM
MAX PREDICTED HEART RATE: 153 BPM
MAX. SYSTOLIC BP: 200 MMHG
PROTOCOL NAME: NORMAL
REASON FOR TERMINATION: NORMAL
TARGET HR FORMULA: NORMAL
TEST INDICATION: NORMAL
TIME IN EXERCISE PHASE: NORMAL

## 2019-04-23 PROCEDURE — 93018 CV STRESS TEST I&R ONLY: CPT | Performed by: INTERNAL MEDICINE

## 2019-04-23 PROCEDURE — 93016 CV STRESS TEST SUPVJ ONLY: CPT | Performed by: INTERNAL MEDICINE

## 2019-04-23 PROCEDURE — 99212 OFFICE O/P EST SF 10 MIN: CPT | Performed by: PODIATRIST

## 2019-05-19 DIAGNOSIS — E11.42 DIABETIC POLYNEUROPATHY ASSOCIATED WITH TYPE 2 DIABETES MELLITUS (HCC): ICD-10-CM

## 2019-05-19 RX ORDER — GLIMEPIRIDE 2 MG/1
TABLET ORAL
Qty: 90 TABLET | Refills: 1 | Status: SHIPPED | OUTPATIENT
Start: 2019-05-19 | End: 2019-06-26 | Stop reason: ALTCHOICE

## 2019-05-20 DIAGNOSIS — I10 ESSENTIAL HYPERTENSION: Primary | ICD-10-CM

## 2019-05-20 RX ORDER — METOPROLOL SUCCINATE 25 MG/1
25 TABLET, EXTENDED RELEASE ORAL DAILY
Qty: 90 TABLET | Refills: 1 | Status: SHIPPED | OUTPATIENT
Start: 2019-05-20 | End: 2019-05-23 | Stop reason: ALTCHOICE

## 2019-05-21 ENCOUNTER — TELEPHONE (OUTPATIENT)
Dept: FAMILY MEDICINE CLINIC | Facility: CLINIC | Age: 69
End: 2019-05-21

## 2019-05-23 ENCOUNTER — TELEPHONE (OUTPATIENT)
Dept: FAMILY MEDICINE CLINIC | Facility: CLINIC | Age: 69
End: 2019-05-23

## 2019-05-23 DIAGNOSIS — I25.10 CORONARY ARTERY DISEASE INVOLVING NATIVE CORONARY ARTERY OF NATIVE HEART WITHOUT ANGINA PECTORIS: ICD-10-CM

## 2019-05-23 DIAGNOSIS — I10 ESSENTIAL HYPERTENSION: ICD-10-CM

## 2019-05-23 DIAGNOSIS — E78.2 MIXED HYPERLIPIDEMIA: ICD-10-CM

## 2019-05-23 RX ORDER — METOPROLOL SUCCINATE 25 MG/1
25 TABLET, EXTENDED RELEASE ORAL
Qty: 90 TABLET | Refills: 1
Start: 2019-05-23 | End: 2019-05-29 | Stop reason: SDUPTHER

## 2019-05-23 RX ORDER — ATORVASTATIN CALCIUM 40 MG/1
40 TABLET, FILM COATED ORAL DAILY
Qty: 90 TABLET | Refills: 1 | Status: SHIPPED | OUTPATIENT
Start: 2019-05-23 | End: 2019-11-13 | Stop reason: SDUPTHER

## 2019-05-25 DIAGNOSIS — E11.42 DIABETIC POLYNEUROPATHY ASSOCIATED WITH TYPE 2 DIABETES MELLITUS (HCC): ICD-10-CM

## 2019-05-25 RX ORDER — BLOOD SUGAR DIAGNOSTIC
STRIP MISCELLANEOUS
Qty: 100 EACH | Refills: 3 | Status: SHIPPED | OUTPATIENT
Start: 2019-05-25 | End: 2020-06-09

## 2019-05-29 ENCOUNTER — TELEPHONE (OUTPATIENT)
Dept: FAMILY MEDICINE CLINIC | Facility: CLINIC | Age: 69
End: 2019-05-29

## 2019-05-29 DIAGNOSIS — I10 ESSENTIAL HYPERTENSION: ICD-10-CM

## 2019-05-29 RX ORDER — METOPROLOL SUCCINATE 25 MG/1
25 TABLET, EXTENDED RELEASE ORAL
Qty: 90 TABLET | Refills: 1 | Status: SHIPPED | OUTPATIENT
Start: 2019-05-29 | End: 2020-05-15

## 2019-06-10 DIAGNOSIS — I10 BENIGN ESSENTIAL HYPERTENSION: ICD-10-CM

## 2019-06-10 DIAGNOSIS — E11.42 DIABETIC POLYNEUROPATHY ASSOCIATED WITH TYPE 2 DIABETES MELLITUS (HCC): ICD-10-CM

## 2019-06-10 RX ORDER — BENAZEPRIL HYDROCHLORIDE 5 MG/1
TABLET, FILM COATED ORAL
Qty: 90 TABLET | Refills: 1 | Status: SHIPPED | OUTPATIENT
Start: 2019-06-10 | End: 2019-11-13 | Stop reason: SDUPTHER

## 2019-06-22 LAB
ALBUMIN SERPL-MCNC: 4.5 G/DL (ref 3.6–4.8)
ALBUMIN/GLOB SERPL: 1.9 {RATIO} (ref 1.2–2.2)
ALP SERPL-CCNC: 57 IU/L (ref 39–117)
ALT SERPL-CCNC: 19 IU/L (ref 0–44)
AST SERPL-CCNC: 14 IU/L (ref 0–40)
BILIRUB SERPL-MCNC: 0.5 MG/DL (ref 0–1.2)
BUN SERPL-MCNC: 10 MG/DL (ref 8–27)
BUN/CREAT SERPL: 10 (ref 10–24)
CALCIUM SERPL-MCNC: 9.1 MG/DL (ref 8.6–10.2)
CHLORIDE SERPL-SCNC: 99 MMOL/L (ref 96–106)
CHOLEST SERPL-MCNC: 138 MG/DL (ref 100–199)
CO2 SERPL-SCNC: 24 MMOL/L (ref 20–29)
CREAT SERPL-MCNC: 1.01 MG/DL (ref 0.76–1.27)
GLOBULIN SER-MCNC: 2.4 G/DL (ref 1.5–4.5)
GLUCOSE SERPL-MCNC: 77 MG/DL (ref 65–99)
HBA1C MFR BLD: 6.6 % (ref 4.8–5.6)
HDLC SERPL-MCNC: 42 MG/DL
LABCORP COMMENT: NORMAL
LDLC SERPL CALC-MCNC: 74 MG/DL (ref 0–99)
MICRODELETION SYND BLD/T FISH: NORMAL
POTASSIUM SERPL-SCNC: 4.8 MMOL/L (ref 3.5–5.2)
PROT SERPL-MCNC: 6.9 G/DL (ref 6–8.5)
SL AMB EGFR AFRICAN AMERICAN: 88 ML/MIN/1.73
SL AMB EGFR NON AFRICAN AMERICAN: 76 ML/MIN/1.73
SODIUM SERPL-SCNC: 139 MMOL/L (ref 134–144)
TRIGL SERPL-MCNC: 111 MG/DL (ref 0–149)

## 2019-06-26 ENCOUNTER — TELEPHONE (OUTPATIENT)
Dept: FAMILY MEDICINE CLINIC | Facility: CLINIC | Age: 69
End: 2019-06-26

## 2019-06-26 ENCOUNTER — OFFICE VISIT (OUTPATIENT)
Dept: FAMILY MEDICINE CLINIC | Facility: CLINIC | Age: 69
End: 2019-06-26
Payer: MEDICARE

## 2019-06-26 VITALS
TEMPERATURE: 97.9 F | SYSTOLIC BLOOD PRESSURE: 142 MMHG | DIASTOLIC BLOOD PRESSURE: 82 MMHG | HEART RATE: 60 BPM | HEIGHT: 69 IN | RESPIRATION RATE: 16 BRPM | WEIGHT: 228 LBS | BODY MASS INDEX: 33.77 KG/M2

## 2019-06-26 DIAGNOSIS — Z00.00 MEDICARE ANNUAL WELLNESS VISIT, SUBSEQUENT: Primary | ICD-10-CM

## 2019-06-26 DIAGNOSIS — E11.42 DIABETIC POLYNEUROPATHY ASSOCIATED WITH TYPE 2 DIABETES MELLITUS (HCC): Primary | ICD-10-CM

## 2019-06-26 DIAGNOSIS — I10 ESSENTIAL HYPERTENSION: ICD-10-CM

## 2019-06-26 DIAGNOSIS — Z12.5 PROSTATE CANCER SCREENING: ICD-10-CM

## 2019-06-26 DIAGNOSIS — E11.42 DIABETIC POLYNEUROPATHY ASSOCIATED WITH TYPE 2 DIABETES MELLITUS (HCC): ICD-10-CM

## 2019-06-26 PROCEDURE — G0439 PPPS, SUBSEQ VISIT: HCPCS | Performed by: FAMILY MEDICINE

## 2019-07-23 ENCOUNTER — OFFICE VISIT (OUTPATIENT)
Dept: PODIATRY | Facility: CLINIC | Age: 69
End: 2019-07-23
Payer: MEDICARE

## 2019-07-23 VITALS
DIASTOLIC BLOOD PRESSURE: 87 MMHG | HEIGHT: 69 IN | SYSTOLIC BLOOD PRESSURE: 144 MMHG | BODY MASS INDEX: 33.77 KG/M2 | WEIGHT: 228 LBS

## 2019-07-23 DIAGNOSIS — M79.671 PAIN IN BOTH FEET: ICD-10-CM

## 2019-07-23 DIAGNOSIS — E11.42 DIABETIC POLYNEUROPATHY ASSOCIATED WITH TYPE 2 DIABETES MELLITUS (HCC): Primary | ICD-10-CM

## 2019-07-23 DIAGNOSIS — I70.209 ATHEROSCLEROSIS OF ARTERIES OF EXTREMITIES (HCC): ICD-10-CM

## 2019-07-23 DIAGNOSIS — B35.1 ONYCHOMYCOSIS: ICD-10-CM

## 2019-07-23 DIAGNOSIS — M79.672 PAIN IN BOTH FEET: ICD-10-CM

## 2019-07-23 PROCEDURE — 11721 DEBRIDE NAIL 6 OR MORE: CPT | Performed by: PODIATRIST

## 2019-07-23 NOTE — PROGRESS NOTES
Assessment/Plan:      Aseptic debridement and planning of nails x10 and manually and mechanically  Daily foot checks monitor for signs of infection  Follow-up 2 months     Diagnoses and all orders for this visit:    Diabetic polyneuropathy associated with type 2 diabetes mellitus ([facility] )    Atherosclerosis of arteries of extremities ([place] [address] )    Pain in both feet    Onychomycosis          Subjective:      Patient ID: Jesus De Leon is a 77 y o  male  Patient thick painful nails that hurt when walking wearing shoes  Patient has not noticed any redness or signs of infection  Past Medical History:   Diagnosis Date    Acquired ankle/foot deformity     last assessed: 05/30/2017    Acute myocardial infarction [facility])     last assessed: 12/03/2013    Anemia     last assessed: 12/03/2013    Anxiety     last assessed: 12/03/2013    Cardiomyopathy ([place] [address] )     last assessed: 12/03/2013    Congestive heart failure (CHF) ([place] [address] )     onset: 02/29/2012    Coronary artery disease     Diabetes mellitus ([place] [address] )     Dysesthesia     last assessed: 09/10/2014    Exposure to hepatitis     last assessed: 11/15/2016    Foot slap     last assessed: 03/17/2016    Hypertension     Myocardial infarction [facility])     Palsy of left sciatic nerve     last assessed: 03/17/2016    Subconjunctival hemorrhage     last assessed: 03/03/2014    Xerosis cutis     last assessed: 10/25/2016       Past Surgical History:   Procedure Laterality Date    ANGIOPLASTY      2 coronary stents    CHOLECYSTECTOMY      COLONOSCOPY N/A 3/17/2016    Procedure: COLONOSCOPY;  Surgeon: Jax Novoa MD;  Location[facility];   Service:     HERNIA REPAIR      right inguinal        No Known Allergies      Current Outpatient Medications:     aspirin (ECOTRIN LOW STRENGTH) 81 mg EC tablet, Take 1 tablet (81 mg total) by mouth daily, Disp: 30 tablet, Rfl: 5    atorvastatin (LIPITOR) 40 mg tablet, Take 1 tablet (40 mg total) by mouth daily, Disp: 90 tablet, Rfl: 1    LUIS FERNANDO MICROLET LANCETS lancets, Test glucose once daily DX: E11 42, Disp: 100 each, Rfl: 3    benazepril (LOTENSIN) 5 mg tablet, TAKE ONE TABLET BY MOUTH EVERY DAY (GENERIC FOR LOTENSIN), Disp: 90 tablet, Rfl: 1    clopidogrel (PLAVIX) 75 mg tablet, Take 75 mg by mouth daily Indications: Heart Attack  , Disp: , Rfl:     CONTOUR NEXT TEST test strip, USE TO TEST BLOOD GLUCOSE ONCE DAILY, Disp: 100 each, Rfl: 3    gabapentin (NEURONTIN) 100 mg capsule, TAKE ONE CAPSULE BY MOUTH TWICE A DAY, Disp: 180 capsule, Rfl: 1    gabapentin (NEURONTIN) 400 mg capsule, Take 1 capsule (400 mg total) by mouth 4 (four) times a day, Disp: 360 capsule, Rfl: 1    metFORMIN (GLUCOPHAGE) 500 mg tablet, Take 1 tablet (500 mg total) by mouth daily with breakfast, Disp: 30 tablet, Rfl: 3    metoprolol succinate (TOPROL-XL) 25 mg 24 hr tablet, Take 1 tablet (25 mg total) by mouth daily at bedtime, Disp: 90 tablet, Rfl: 1    Patient Active Problem List   Diagnosis    Essential hypertension    Diabetic polyneuropathy associated with type 2 diabetes mellitus (HCC)    Mixed hyperlipidemia    Impotence    Myocardial infarction (HCC)    Nerve palsy    Onychomycosis    Peripheral neuropathy    Reflex sympathetic dystrophy    Tabes dorsalis    Atherosclerosis of arteries of extremities (HCC)    Acute non-recurrent maxillary sinusitis       Review of Systems   Constitutional: Negative  HENT: Negative  Eyes: Negative  Respiratory: Negative  Cardiovascular: Negative  Gastrointestinal: Negative  Endocrine: Negative  Genitourinary: Negative  Musculoskeletal: Negative  Skin: Negative  Allergic/Immunologic: Negative  Neurological: Negative  Hematological: Negative  Psychiatric/Behavioral: Negative  Objective:      /87   Ht 5' 9" (1 753 m)   Wt 103 kg (228 lb)   BMI 33 67 kg/m²          Physical Exam   Cardiovascular: Pulses are weak pulses     Pulses:       Dorsalis pedis pulses are 1+ on the right side, and 1+ on the left side  Posterior tibial pulses are 1+ on the right side, and 1+ on the left side  Musculoskeletal:        Feet:    Feet:   Right Foot:   Skin Integrity: Positive for callus and dry skin  Left Foot:   Skin Integrity: Positive for callus and dry skin  Patient's shoes and socks removed  Right Foot/Ankle   Right Foot Inspection  Skin Exam: dry skin, callus and callus                            Sensory   Vibration: absent  Proprioception: diminished   Monofilament testing: diminished  Vascular  Capillary refills: elevated  The right DP pulse is 1+  The right PT pulse is 1+  Left Foot/Ankle  Left Foot Inspection  Skin Exam: dry skin and callus                                         Sensory   Vibration: absent  Proprioception: diminished  Monofilament: diminished  Vascular  Capillary refills: elevated  The left DP pulse is 1+  The left PT pulse is 1+  Assign Risk Category:  Deformity present; Loss of protective sensation; Weak pulses       Risk: 2         Constitutional: no acute distress, well appearing and well nourished       Orthopedic/Biomechanical: ingrown nails       Skin: Left hallux, decreased erythema decreased exudate, resolving, infection       Neurologic: decreased vibration sensation       Psychiatric: oriented to person, place, and time     Left Foot: Appearance: Normal except as noted: excessive pronation-- and-- pes planus  Great toe deformities include a bunion  Second toe deformities include hammer toe  Third toe deformities include hammer toe  Forth toe deformities include hammer toe  Fifth toe deformities include hammer toe  Evaluation of the great toe nail demonstrates paronychia-- and-- an ingrown nail  Evaluation of the fourth toe nail demonstrates subungual hematoma  Selam Cunning +1 pitting edema bilateral feet and ankles negative calf tenderness negative Homans sign  ROM: Full   Special Tests: Negative erythema, mild edema, no signs of infection no open wounds     Right Foot: Appearance: Normal except as noted: excessive pronation-- and-- pes planus  Second toe deformities include hammer toe  Third toe deformities include hammer toe  Forth toe deformities include hammer toe  Fifth toe deformities include hammer toe  Left Ankle: ROM: limited ROM in all planes Motor: diffuse weakness     Right Ankle: ROM: limited ROM in all planes Motor: diffuse weakness     Neurological Exam: performed  Light touch was intact bilaterally  Vibratory sensation was decreased in the right ankle-- and-- decreased in the left first metatarsophalangeal joint  Response to monofilament test was intact bilaterally     Hyperkeratosis: present on both second toes,-- present on the right second toe,-- present on both first sub metatarsals-- and-- present on both fifth sub metatarsals     Shoe Gear Evaluation: performed ()  Right Foot: width: e-- and-- length: 11  Left Foot: width: e-- and-- length: 11  Recommendation(s): extra depth diabetic shoes-- and-- SAS style  Preulcerative lesions on distal aspect of the 2nd digit right worse than left  Skin is hairless and atrophic  Negative calf tenderness mild edema  No signs of infection    Negative Tinel sign tarsal tunnel bilateral  Muscle strength 5/5 all groups bilateral feet and ankles  Significantly reduced sensation bilateral   Negative erythema no signs of infection  Foot Exam    General  General Appearance: appears stated age and healthy   Orientation: alert and oriented to person, place, and time   Affect: appropriate   Gait: unimpaired       Right Foot/Ankle     Inspection and Palpation  Skin Exam: callus and dry skin;     Neurovascular  Dorsalis pedis: 1+  Posterior tibial: 1+      Left Foot/Ankle      Inspection and Palpation  Skin Exam: callus and dry skin;     Neurovascular  Dorsalis pedis: 1+  Posterior tibial: 1+              Nail is thickened dystrophic    moderate edema bilateral feet and ankles         muscle strength 5/5 all groups bilateral feet and ankles  No open wound or signs of infection  Mild swelling/ edema bilateral feet and ankles  Negative Tinel sign tarsal tunnel bilateral  Decreased vibratory sensation bilateral feet and ankles  Monofilament sensation 4/10 absent bilateral foot forefoot  Mild ingrown right hallux tibial border no sign of infection positive pain on palpation

## 2019-08-20 DIAGNOSIS — G60.9 IDIOPATHIC PERIPHERAL NEUROPATHY: ICD-10-CM

## 2019-08-20 RX ORDER — GABAPENTIN 100 MG/1
CAPSULE ORAL
Qty: 180 CAPSULE | Refills: 0 | Status: SHIPPED | OUTPATIENT
Start: 2019-08-20 | End: 2019-11-13 | Stop reason: SDUPTHER

## 2019-08-20 RX ORDER — GABAPENTIN 400 MG/1
CAPSULE ORAL
Qty: 360 CAPSULE | Refills: 0 | Status: SHIPPED | OUTPATIENT
Start: 2019-08-20 | End: 2019-11-13 | Stop reason: SDUPTHER

## 2019-09-12 ENCOUNTER — TELEPHONE (OUTPATIENT)
Dept: FAMILY MEDICINE CLINIC | Facility: CLINIC | Age: 69
End: 2019-09-12

## 2019-09-12 DIAGNOSIS — I21.9 MYOCARDIAL INFARCTION, UNSPECIFIED MI TYPE, UNSPECIFIED ARTERY (HCC): Primary | ICD-10-CM

## 2019-09-12 RX ORDER — CLOPIDOGREL BISULFATE 75 MG/1
75 TABLET ORAL DAILY
Qty: 90 TABLET | Refills: 1 | Status: SHIPPED | OUTPATIENT
Start: 2019-09-12 | End: 2020-02-26

## 2019-09-21 LAB
ALBUMIN SERPL-MCNC: 4.6 G/DL (ref 3.6–4.8)
ALBUMIN/GLOB SERPL: 1.9 {RATIO} (ref 1.2–2.2)
ALP SERPL-CCNC: 60 IU/L (ref 39–117)
ALT SERPL-CCNC: 22 IU/L (ref 0–44)
AST SERPL-CCNC: 17 IU/L (ref 0–40)
BILIRUB SERPL-MCNC: 0.7 MG/DL (ref 0–1.2)
BUN SERPL-MCNC: 12 MG/DL (ref 8–27)
BUN/CREAT SERPL: 12 (ref 10–24)
CALCIUM SERPL-MCNC: 9.8 MG/DL (ref 8.6–10.2)
CHLORIDE SERPL-SCNC: 101 MMOL/L (ref 96–106)
CO2 SERPL-SCNC: 26 MMOL/L (ref 20–29)
CREAT SERPL-MCNC: 0.99 MG/DL (ref 0.76–1.27)
GLOBULIN SER-MCNC: 2.4 G/DL (ref 1.5–4.5)
GLUCOSE SERPL-MCNC: 112 MG/DL (ref 65–99)
HBA1C MFR BLD: 7 % (ref 4.8–5.6)
POTASSIUM SERPL-SCNC: 5.3 MMOL/L (ref 3.5–5.2)
PROT SERPL-MCNC: 7 G/DL (ref 6–8.5)
PSA SERPL-MCNC: 1.3 NG/ML (ref 0–4)
SL AMB EGFR AFRICAN AMERICAN: 90 ML/MIN/1.73
SL AMB EGFR NON AFRICAN AMERICAN: 78 ML/MIN/1.73
SODIUM SERPL-SCNC: 141 MMOL/L (ref 134–144)
TSH SERPL DL<=0.005 MIU/L-ACNC: 1.39 UIU/ML (ref 0.45–4.5)

## 2019-09-26 ENCOUNTER — OFFICE VISIT (OUTPATIENT)
Dept: FAMILY MEDICINE CLINIC | Facility: CLINIC | Age: 69
End: 2019-09-26
Payer: MEDICARE

## 2019-09-26 ENCOUNTER — TELEPHONE (OUTPATIENT)
Dept: FAMILY MEDICINE CLINIC | Facility: CLINIC | Age: 69
End: 2019-09-26

## 2019-09-26 VITALS
BODY MASS INDEX: 31.99 KG/M2 | SYSTOLIC BLOOD PRESSURE: 120 MMHG | TEMPERATURE: 96.5 F | HEIGHT: 69 IN | DIASTOLIC BLOOD PRESSURE: 60 MMHG | RESPIRATION RATE: 16 BRPM | HEART RATE: 60 BPM | WEIGHT: 216 LBS

## 2019-09-26 DIAGNOSIS — E11.42 DIABETIC POLYNEUROPATHY ASSOCIATED WITH TYPE 2 DIABETES MELLITUS (HCC): Primary | ICD-10-CM

## 2019-09-26 DIAGNOSIS — E78.2 MIXED HYPERLIPIDEMIA: ICD-10-CM

## 2019-09-26 DIAGNOSIS — I10 ESSENTIAL HYPERTENSION: ICD-10-CM

## 2019-09-26 DIAGNOSIS — E87.5 HYPERKALEMIA: ICD-10-CM

## 2019-09-26 PROCEDURE — 99214 OFFICE O/P EST MOD 30 MIN: CPT | Performed by: FAMILY MEDICINE

## 2019-09-26 NOTE — ASSESSMENT & PLAN NOTE
Lab Results   Component Value Date    HGBA1C 7 0 (H) 09/20/2019     a1c is up to 7 0  Dose of metformin increased from 500 once a day to twice a day

## 2019-09-26 NOTE — PROGRESS NOTES
Assessment/Plan:    1  Diabetic polyneuropathy associated with type 2 diabetes mellitus (HCC)  Assessment & Plan:    Lab Results   Component Value Date    HGBA1C 7 0 (H) 09/20/2019     a1c is up to 7 0  Dose of metformin increased from 500 once a day to twice a day    Orders:  -     metFORMIN (GLUCOPHAGE) 500 mg tablet; Take 1 tablet (500 mg total) by mouth 2 (two) times a day  -     Hemoglobin A1C; Future; Expected date: 12/10/2019  -     Comprehensive metabolic panel; Future; Expected date: 12/10/2019  -     Hemoglobin A1C  -     Comprehensive metabolic panel    2  Essential hypertension  Assessment & Plan:  Well controlled     Orders:  -     CBC; Future; Expected date: 12/10/2019  -     Comprehensive metabolic panel; Future; Expected date: 12/10/2019  -     Lipid Panel with Direct LDL reflex; Future; Expected date: 12/10/2019  -     TSH, 3rd generation; Future; Expected date: 12/10/2019  -     CBC  -     Comprehensive metabolic panel  -     Lipid Panel with Direct LDL reflex  -     TSH, 3rd generation    3  Mixed hyperlipidemia  Assessment & Plan:  Has been controlled  Will check levels in 3 months    Orders:  -     Comprehensive metabolic panel; Future; Expected date: 12/10/2019  -     Lipid Panel with Direct LDL reflex; Future; Expected date: 12/10/2019  -     Comprehensive metabolic panel  -     Lipid Panel with Direct LDL reflex    4  Hyperkalemia  Assessment & Plan:  New, potassium is up minimally  Will recheck BMP in 2 weeks     Orders:  -     Basic metabolic panel; Future; Expected date: 10/10/2019  -     Basic metabolic panel        BMI Counseling: Body mass index is 31 9 kg/m²  Discussed the patient's BMI with him  The BMI is above normal  Exercise recommendations include exercising 3-5 times per week          Patient Instructions     Recent Results (from the past 672 hour(s))   Comprehensive metabolic panel    Collection Time: 09/20/19  7:48 AM   Result Value Ref Range    Glucose, Random 112 (H) 65 - 99 mg/dL    BUN 12 8 - 27 mg/dL    Creatinine 0 99 0 76 - 1 27 mg/dL    eGFR Non African American 78 >59 mL/min/1 73    eGFR  90 >59 mL/min/1 73    SL AMB BUN/CREATININE RATIO 12 10 - 24    Sodium 141 134 - 144 mmol/L    Potassium 5 3 (H) 3 5 - 5 2 mmol/L    Chloride 101 96 - 106 mmol/L    CO2 26 20 - 29 mmol/L    CALCIUM 9 8 8 6 - 10 2 mg/dL    Protein, Total 7 0 6 0 - 8 5 g/dL    Albumin 4 6 3 6 - 4 8 g/dL    Globulin, Total 2 4 1 5 - 4 5 g/dL    Albumin/Globulin Ratio 1 9 1 2 - 2 2    TOTAL BILIRUBIN 0 7 0 0 - 1 2 mg/dL    Alk Phos Isoenzymes 60 39 - 117 IU/L    AST 17 0 - 40 IU/L    ALT 22 0 - 44 IU/L   Hemoglobin A1c (w/out EAG)    Collection Time: 09/20/19  7:48 AM   Result Value Ref Range    Hemoglobin A1C 7 0 (H) 4 8 - 5 6 %   TSH, 3rd generation    Collection Time: 09/20/19  7:48 AM   Result Value Ref Range    TSH 1 390 0 450 - 4 500 uIU/mL   PSA Total, Diagnostic    Collection Time: 09/20/19  7:48 AM   Result Value Ref Range    Prostate Specific Antigen Total 1 3 0 0 - 4 0 ng/mL           Return in about 3 months (around 12/26/2019) for Next scheduled follow up  Subjective:      Patient ID: Misbah Valerio is a 77 y o  male  Chief Complaint   Patient presents with    Follow-up     medication ac/cma     Results    Blood Pressure Check    Diabetes       Diabetes-His sugars at home have been going up  He is concerned that the dose of metformin is not strong enough  He has lost weight  He is seeing a lady and they have a healthy breakfast every day  Hyperlipidemia-patient is taking his atorvastatin daily  Denies any muscle aches from medication  We discussed his recent visit to the cardiologist   He has been taking his 81 mg of aspirin daily  It is hard for him to change from the higher dose but understands now a needs to be on the lower dose            The following portions of the patient's history were reviewed and updated as appropriate:  past social history    Review of Systems   Respiratory: Negative  Cardiovascular: Negative  Current Outpatient Medications   Medication Sig Dispense Refill    aspirin (ECOTRIN LOW STRENGTH) 81 mg EC tablet Take 1 tablet (81 mg total) by mouth daily 30 tablet 5    atorvastatin (LIPITOR) 40 mg tablet Take 1 tablet (40 mg total) by mouth daily 90 tablet 1    LUIS FERNANDO MICROLET LANCETS lancets Test glucose once daily DX: E11 42 100 each 3    benazepril (LOTENSIN) 5 mg tablet TAKE ONE TABLET BY MOUTH EVERY DAY (GENERIC FOR LOTENSIN) 90 tablet 1    clopidogrel (PLAVIX) 75 mg tablet Take 1 tablet (75 mg total) by mouth daily 90 tablet 1    CONTOUR NEXT TEST test strip USE TO TEST BLOOD GLUCOSE ONCE DAILY 100 each 3    gabapentin (NEURONTIN) 100 mg capsule TAKE ONE CAPSULE BY MOUTH TWICE A  capsule 0    gabapentin (NEURONTIN) 400 mg capsule TAKE ONE CAPSULE BY MOUTH FOUR TIMES A  capsule 0    metFORMIN (GLUCOPHAGE) 500 mg tablet Take 1 tablet (500 mg total) by mouth 2 (two) times a day 180 tablet 1    metoprolol succinate (TOPROL-XL) 25 mg 24 hr tablet Take 1 tablet (25 mg total) by mouth daily at bedtime 90 tablet 1     No current facility-administered medications for this visit  Objective:    /60   Pulse 60   Temp (!) 96 5 °F (35 8 °C)   Resp 16   Ht 5' 9" (1 753 m)   Wt 98 kg (216 lb)   BMI 31 90 kg/m²        Physical Exam   Constitutional: He appears well-developed and well-nourished  HENT:   Head: Normocephalic and atraumatic  Right Ear: External ear normal    Left Ear: External ear normal    Mouth/Throat: Oropharynx is clear and moist    Cardiovascular: Normal rate, regular rhythm and normal heart sounds  No murmur heard  Pulmonary/Chest: Effort normal and breath sounds normal  No respiratory distress  He has no wheezes  He has no rales  Musculoskeletal: He exhibits no edema or tenderness  Nursing note and vitals reviewed               Ko Holt DO

## 2019-09-26 NOTE — PATIENT INSTRUCTIONS
Recent Results (from the past 672 hour(s))   Comprehensive metabolic panel    Collection Time: 09/20/19  7:48 AM   Result Value Ref Range    Glucose, Random 112 (H) 65 - 99 mg/dL    BUN 12 8 - 27 mg/dL    Creatinine 0 99 0 76 - 1 27 mg/dL    eGFR Non African American 78 >59 mL/min/1 73    eGFR  90 >59 mL/min/1 73    SL AMB BUN/CREATININE RATIO 12 10 - 24    Sodium 141 134 - 144 mmol/L    Potassium 5 3 (H) 3 5 - 5 2 mmol/L    Chloride 101 96 - 106 mmol/L    CO2 26 20 - 29 mmol/L    CALCIUM 9 8 8 6 - 10 2 mg/dL    Protein, Total 7 0 6 0 - 8 5 g/dL    Albumin 4 6 3 6 - 4 8 g/dL    Globulin, Total 2 4 1 5 - 4 5 g/dL    Albumin/Globulin Ratio 1 9 1 2 - 2 2    TOTAL BILIRUBIN 0 7 0 0 - 1 2 mg/dL    Alk Phos Isoenzymes 60 39 - 117 IU/L    AST 17 0 - 40 IU/L    ALT 22 0 - 44 IU/L   Hemoglobin A1c (w/out EAG)    Collection Time: 09/20/19  7:48 AM   Result Value Ref Range    Hemoglobin A1C 7 0 (H) 4 8 - 5 6 %   TSH, 3rd generation    Collection Time: 09/20/19  7:48 AM   Result Value Ref Range    TSH 1 390 0 450 - 4 500 uIU/mL   PSA Total, Diagnostic    Collection Time: 09/20/19  7:48 AM   Result Value Ref Range    Prostate Specific Antigen Total 1 3 0 0 - 4 0 ng/mL

## 2019-09-26 NOTE — TELEPHONE ENCOUNTER
Please call back today, he forgot to ask Dr Susan Ramos about a pneumonia vaccine and when to get it and which he had before

## 2019-10-03 LAB
BUN SERPL-MCNC: 15 MG/DL (ref 8–27)
BUN/CREAT SERPL: 15 (ref 10–24)
CALCIUM SERPL-MCNC: 9.8 MG/DL (ref 8.6–10.2)
CHLORIDE SERPL-SCNC: 100 MMOL/L (ref 96–106)
CO2 SERPL-SCNC: 24 MMOL/L (ref 20–29)
CREAT SERPL-MCNC: 1.03 MG/DL (ref 0.76–1.27)
GLUCOSE SERPL-MCNC: 94 MG/DL (ref 65–99)
POTASSIUM SERPL-SCNC: 4.8 MMOL/L (ref 3.5–5.2)
SL AMB EGFR AFRICAN AMERICAN: 86 ML/MIN/1.73
SL AMB EGFR NON AFRICAN AMERICAN: 74 ML/MIN/1.73
SODIUM SERPL-SCNC: 140 MMOL/L (ref 134–144)

## 2019-10-07 ENCOUNTER — OFFICE VISIT (OUTPATIENT)
Dept: CARDIOLOGY CLINIC | Facility: CLINIC | Age: 69
End: 2019-10-07
Payer: MEDICARE

## 2019-10-07 VITALS
OXYGEN SATURATION: 96 % | SYSTOLIC BLOOD PRESSURE: 120 MMHG | BODY MASS INDEX: 31.7 KG/M2 | HEIGHT: 69 IN | DIASTOLIC BLOOD PRESSURE: 64 MMHG | WEIGHT: 214 LBS | HEART RATE: 56 BPM

## 2019-10-07 DIAGNOSIS — E78.2 MIXED HYPERLIPIDEMIA: ICD-10-CM

## 2019-10-07 DIAGNOSIS — I25.10 CORONARY ARTERY DISEASE INVOLVING NATIVE CORONARY ARTERY OF NATIVE HEART WITHOUT ANGINA PECTORIS: Primary | ICD-10-CM

## 2019-10-07 DIAGNOSIS — G60.9 IDIOPATHIC PERIPHERAL NEUROPATHY: ICD-10-CM

## 2019-10-07 DIAGNOSIS — E11.42 DIABETIC POLYNEUROPATHY ASSOCIATED WITH TYPE 2 DIABETES MELLITUS (HCC): ICD-10-CM

## 2019-10-07 DIAGNOSIS — I10 ESSENTIAL HYPERTENSION: ICD-10-CM

## 2019-10-07 DIAGNOSIS — I70.209 ATHEROSCLEROSIS OF ARTERIES OF EXTREMITIES (HCC): ICD-10-CM

## 2019-10-07 PROCEDURE — 99214 OFFICE O/P EST MOD 30 MIN: CPT | Performed by: INTERNAL MEDICINE

## 2019-10-07 NOTE — PROGRESS NOTES
Cardiology Followup     Monico Jones  [id number]  1950  [facility]ERR[facility] [place] [address] [address]    Consult for: CAD    Interval History: Monioc Jones is a 77y o  year old male  who is here for followup of CAD  In February 2012, he had a myocardial infarction where he felt a band like pain across the front of his chest   No associated dyspnea  He underwent PCI *2 to mid LAD with Xience 3 5mm * 12mm  and 4 0 mm * 12mm stents  Since then, he has been feeling well without any chest pain or shortness of breath  Stress test was done last month which was normal without any ischemia present  He has been eating well with smaller portion size  He has lost almost 10 pounds since his last visit  He denies tobacco use and drinks occasionally        Past Medical History:   Diagnosis Date    Acquired ankle/foot deformity     last assessed: 05/30/2017    Acute myocardial infarction [facility])     last assessed: 12/03/2013    Anemia     last assessed: 12/03/2013    Anxiety     last assessed: 12/03/2013    Cardiomyopathy ([facility] [address] )     last assessed: 12/03/2013    Congestive heart failure (CHF) ([facility] [address] )     onset: 02/29/2012    Coronary artery disease     Diabetes mellitus ([facility] [address] )     Dysesthesia     last assessed: 09/10/2014    Exposure to hepatitis     last assessed: 11/15/2016    Foot slap     last assessed: 03/17/2016    Hypertension     Myocardial infarction [facility])     Palsy of left sciatic nerve     last assessed: 03/17/2016    Subconjunctival hemorrhage     last assessed: 03/03/2014    Xerosis cutis     last assessed: 10/25/2016     Social History     Socioeconomic History    Marital status:      Spouse name: Not on file    Number of children: Not on file    Years of education: Not on file    Highest education level: Not on file   Occupational History    Not on file   Social Needs    Financial resource strain: Not on file    Food insecurity:     Worry: Not on file     Inability: Not on file    Transportation needs:     Medical: Not on file     Non-medical: Not on file   Tobacco Use    Smoking status: Never Smoker    Smokeless tobacco: Never Used   Substance and Sexual Activity    Alcohol use: No    Drug use: No    Sexual activity: Not on file   Lifestyle    Physical activity:     Days per week: Not on file     Minutes per session: Not on file    Stress: Not on file   Relationships    Social connections:     Talks on phone: Not on file     Gets together: Not on file     Attends Yarsani service: Not on file     Active member of club or organization: Not on file     Attends meetings of clubs or organizations: Not on file     Relationship status: Not on file    Intimate partner violence:     Fear of current or ex partner: Not on file     Emotionally abused: Not on file     Physically abused: Not on file     Forced sexual activity: Not on file   Other Topics Concern    Not on file   Social History Narrative    Not on file      Family History   Problem Relation Age of Onset    Heart disease Mother         cardiac disorder    Leukemia Father     Ovarian cancer Sister      Past Surgical History:   Procedure Laterality Date    ANGIOPLASTY      2 coronary stents    CHOLECYSTECTOMY      COLONOSCOPY N/A 3/17/2016    Procedure: COLONOSCOPY;  Surgeon: Lennox Iba, MD;  Location: [facility];   Service:     HERNIA REPAIR      right inguinal        Current Outpatient Medications:     aspirin (ECOTRIN LOW STRENGTH) 81 mg EC tablet, Take 1 tablet (81 mg total) by mouth daily, Disp: 30 tablet, Rfl: 5    atorvastatin (LIPITOR) 40 mg tablet, Take 1 tablet (40 mg total) by mouth daily, Disp: 90 tablet, Rfl: 1    LUIS FERNANDO MICROLET LANCETS lancets, Test glucose once daily DX: E11 42, Disp: 100 each, Rfl: 3    benazepril (LOTENSIN) 5 mg tablet, TAKE ONE TABLET BY MOUTH EVERY DAY (GENERIC FOR LOTENSIN), Disp: 90 tablet, Rfl: 1    clopidogrel (PLAVIX) 75 mg tablet, Take 1 tablet (75 mg total) by mouth daily, Disp: 90 tablet, Rfl: 1    CONTOUR NEXT TEST test strip, USE TO TEST BLOOD GLUCOSE ONCE DAILY, Disp: 100 each, Rfl: 3    gabapentin (NEURONTIN) 100 mg capsule, TAKE ONE CAPSULE BY MOUTH TWICE A DAY, Disp: 180 capsule, Rfl: 0    gabapentin (NEURONTIN) 400 mg capsule, TAKE ONE CAPSULE BY MOUTH FOUR TIMES A DAY, Disp: 360 capsule, Rfl: 0    metFORMIN (GLUCOPHAGE) 500 mg tablet, Take 1 tablet (500 mg total) by mouth 2 (two) times a day, Disp: 180 tablet, Rfl: 1    metoprolol succinate (TOPROL-XL) 25 mg 24 hr tablet, Take 1 tablet (25 mg total) by mouth daily at bedtime, Disp: 90 tablet, Rfl: 1  No Known Allergies      Review of Systems:  Review of Systems   Constitutional: Negative for chills, fatigue and fever  HENT: Negative for congestion, nosebleeds and postnasal drip  Respiratory: Negative for cough, chest tightness and shortness of breath  Cardiovascular: Negative for chest pain, palpitations and leg swelling  Gastrointestinal: Negative for abdominal distention, abdominal pain, diarrhea, nausea and vomiting  Endocrine: Negative for polydipsia, polyphagia and polyuria  Musculoskeletal: Negative for gait problem and myalgias  Skin: Negative for color change, pallor and rash  Allergic/Immunologic: Negative for environmental allergies, food allergies and immunocompromised state  Neurological: Negative for dizziness, seizures, syncope and light-headedness  Hematological: Negative for adenopathy  Does not bruise/bleed easily  Psychiatric/Behavioral: Negative for dysphoric mood  The patient is not nervous/anxious          Physical Exam:  Vitals:    10/07/19 0817   BP: 120/64   BP Location: Left arm   Patient Position: Sitting   Cuff Size: Standard   Pulse: 56   SpO2: 96%   Weight: 97 1 kg (214 lb)   Height: 5' 9" (1 753 m)     Physical Exam   Constitutional: He is oriented to person, place, and time  He appears well-developed  No distress  HENT:   Head: Normocephalic and atraumatic  Eyes: Pupils are equal, round, and reactive to light  Conjunctivae and EOM are normal    Neck: Neck supple  No JVD present  No thyromegaly present  Cardiovascular: Normal rate, regular rhythm and normal heart sounds  Exam reveals no gallop and no friction rub  No murmur heard  Pulmonary/Chest: Effort normal and breath sounds normal    Abdominal: Soft  He exhibits no distension  There is no tenderness  Musculoskeletal: He exhibits no edema  Neurological: He is alert and oriented to person, place, and time  No cranial nerve deficit  Skin: Skin is warm and dry  No rash noted  He is not diaphoretic  No erythema  Psychiatric: He has a normal mood and affect  His behavior is normal  Judgment and thought content normal        Labs:  Lab Results   Component Value Date     12/16/2017    K 4 8 10/02/2019     10/02/2019    CO2 24 10/02/2019    BUN 15 10/02/2019    CREATININE 1 03 10/02/2019    CREATININE 0 97 12/16/2017    GLUCOSE 93 12/16/2017    CALCIUM 9 7 12/16/2017     Lab Results   Component Value Date    WBC 6 8 12/14/2018    WBC 8 3 12/09/2016    HGB 16 6 12/14/2018    HGB 16 0 12/09/2016    HCT 49 4 12/14/2018    HCT 48 4 12/09/2016    MCV 87 12/14/2018    MCV 89 12/09/2016     12/14/2018     12/09/2016     Lab Results   Component Value Date    CHOL 153 12/16/2017    TRIG 111 06/21/2019    HDL 42 06/21/2019    LDLDIRECT 80 10/14/2014     Imaging: No results found  EKG (independently reviewed):  Sinus bradycardia at 55 beats per minute with nonspecific T-wave abnormalities    Discussion/Summary:  1  Coronary artery disease involving native coronary artery of native heart without angina pectoris  - Stress test was normal without any ischemia  Good functional capacity and normal BP response  2  Essential hypertension  - Continue benazepril   = No proteinuria on last urine study      3  Diabetic polyneuropathy associated with type 2 diabetes mellitus ([address] )  - Consider switch of current DM medications to Jardiance (generic name empagliflozin) or Victoza (liraglutide) for cardiovascular benefit  - Recently increased metformin    4  Mixed hyperlipidemia  - Last LDL was 75  Was switched to atorvastatin 40 mg daily  Given history of CAD, ideal LDL should be < 70 (or <40)         5  History of myocardial infarction in adulthood

## 2019-10-11 DIAGNOSIS — E11.42 DIABETIC POLYNEUROPATHY ASSOCIATED WITH TYPE 2 DIABETES MELLITUS (HCC): ICD-10-CM

## 2019-10-11 RX ORDER — LANCETS
EACH MISCELLANEOUS
Qty: 100 EACH | Refills: 3 | Status: SHIPPED | OUTPATIENT
Start: 2019-10-11 | End: 2020-11-06

## 2019-10-17 ENCOUNTER — TELEPHONE (OUTPATIENT)
Dept: FAMILY MEDICINE CLINIC | Facility: CLINIC | Age: 69
End: 2019-10-17

## 2019-10-17 NOTE — TELEPHONE ENCOUNTER
He was born in 1950, he doesn't need the MMR vaccine  He was already exposed to everything in it      Dioni Dudley, DO

## 2019-10-17 NOTE — TELEPHONE ENCOUNTER
Torrie Santiago calling to see if Dr Mae Morrow thinks he needs his MMR vaccine  He said with his health issues, he wasn't sure what is best for him  Aware that DR Koehler won't be in until tomorrow

## 2019-11-08 LAB
LEFT EYE DIABETIC RETINOPATHY: NORMAL
RIGHT EYE DIABETIC RETINOPATHY: NORMAL

## 2019-11-13 DIAGNOSIS — E78.2 MIXED HYPERLIPIDEMIA: ICD-10-CM

## 2019-11-13 DIAGNOSIS — G60.9 IDIOPATHIC PERIPHERAL NEUROPATHY: ICD-10-CM

## 2019-11-13 DIAGNOSIS — I25.10 CORONARY ARTERY DISEASE INVOLVING NATIVE CORONARY ARTERY OF NATIVE HEART WITHOUT ANGINA PECTORIS: ICD-10-CM

## 2019-11-13 DIAGNOSIS — E11.42 DIABETIC POLYNEUROPATHY ASSOCIATED WITH TYPE 2 DIABETES MELLITUS (HCC): ICD-10-CM

## 2019-11-13 DIAGNOSIS — I10 BENIGN ESSENTIAL HYPERTENSION: ICD-10-CM

## 2019-11-13 RX ORDER — BENAZEPRIL HYDROCHLORIDE 5 MG/1
TABLET, FILM COATED ORAL
Qty: 90 TABLET | Refills: 1 | Status: SHIPPED | OUTPATIENT
Start: 2019-11-13 | End: 2020-06-03

## 2019-11-13 RX ORDER — ATORVASTATIN CALCIUM 40 MG/1
TABLET, FILM COATED ORAL
Qty: 90 TABLET | Refills: 1 | Status: SHIPPED | OUTPATIENT
Start: 2019-11-13 | End: 2020-05-15

## 2019-11-13 RX ORDER — GABAPENTIN 100 MG/1
CAPSULE ORAL
Qty: 180 CAPSULE | Refills: 1 | Status: SHIPPED | OUTPATIENT
Start: 2019-11-13 | End: 2020-05-15

## 2019-11-13 RX ORDER — GABAPENTIN 400 MG/1
CAPSULE ORAL
Qty: 360 CAPSULE | Refills: 1 | Status: SHIPPED | OUTPATIENT
Start: 2019-11-13 | End: 2020-05-20 | Stop reason: SDUPTHER

## 2019-12-29 LAB
ALBUMIN SERPL-MCNC: 4.5 G/DL (ref 3.6–4.8)
ALBUMIN/GLOB SERPL: 2.1 {RATIO} (ref 1.2–2.2)
ALP SERPL-CCNC: 64 IU/L (ref 39–117)
ALT SERPL-CCNC: 27 IU/L (ref 0–44)
AST SERPL-CCNC: 17 IU/L (ref 0–40)
BILIRUB SERPL-MCNC: 0.7 MG/DL (ref 0–1.2)
BUN SERPL-MCNC: 15 MG/DL (ref 8–27)
BUN/CREAT SERPL: 18 (ref 10–24)
CALCIUM SERPL-MCNC: 9.4 MG/DL (ref 8.6–10.2)
CHLORIDE SERPL-SCNC: 101 MMOL/L (ref 96–106)
CHOLEST SERPL-MCNC: 141 MG/DL (ref 100–199)
CO2 SERPL-SCNC: 25 MMOL/L (ref 20–29)
CREAT SERPL-MCNC: 0.83 MG/DL (ref 0.76–1.27)
ERYTHROCYTE [DISTWIDTH] IN BLOOD BY AUTOMATED COUNT: 13.7 % (ref 12.3–15.4)
EST. AVERAGE GLUCOSE BLD GHB EST-MCNC: 146 MG/DL
GLOBULIN SER-MCNC: 2.1 G/DL (ref 1.5–4.5)
GLUCOSE SERPL-MCNC: 113 MG/DL (ref 65–99)
HBA1C MFR BLD: 6.7 % (ref 4.8–5.6)
HCT VFR BLD AUTO: 44.3 % (ref 37.5–51)
HDLC SERPL-MCNC: 48 MG/DL
HGB BLD-MCNC: 15.3 G/DL (ref 13–17.7)
LDLC SERPL CALC-MCNC: 76 MG/DL (ref 0–99)
LDLC/HDLC SERPL: 1.6 RATIO (ref 0–3.6)
MCH RBC QN AUTO: 29.7 PG (ref 26.6–33)
MCHC RBC AUTO-ENTMCNC: 34.5 G/DL (ref 31.5–35.7)
MCV RBC AUTO: 86 FL (ref 79–97)
MICRODELETION SYND BLD/T FISH: NORMAL
PLATELET # BLD AUTO: 246 X10E3/UL (ref 150–450)
POTASSIUM SERPL-SCNC: 4.8 MMOL/L (ref 3.5–5.2)
PROT SERPL-MCNC: 6.6 G/DL (ref 6–8.5)
RBC # BLD AUTO: 5.16 X10E6/UL (ref 4.14–5.8)
SL AMB EGFR AFRICAN AMERICAN: 105 ML/MIN/1.73
SL AMB EGFR NON AFRICAN AMERICAN: 90 ML/MIN/1.73
SL AMB VLDL CHOLESTEROL CALC: 17 MG/DL (ref 5–40)
SODIUM SERPL-SCNC: 139 MMOL/L (ref 134–144)
TRIGL SERPL-MCNC: 83 MG/DL (ref 0–149)
TSH SERPL DL<=0.005 MIU/L-ACNC: 1.41 UIU/ML (ref 0.45–4.5)
WBC # BLD AUTO: 7.2 X10E3/UL (ref 3.4–10.8)

## 2019-12-31 NOTE — ASSESSMENT & PLAN NOTE
Lab Results   Component Value Date    HGBA1C 6 3 (H) 09/17/2018       No results for input(s): POCGLU in the last 72 hours  Blood Sugar Average: Last 72 hrs: Well controlled  31-Dec-2019 04:30

## 2020-01-02 ENCOUNTER — OFFICE VISIT (OUTPATIENT)
Dept: FAMILY MEDICINE CLINIC | Facility: CLINIC | Age: 70
End: 2020-01-02
Payer: MEDICARE

## 2020-01-02 VITALS
RESPIRATION RATE: 16 BRPM | HEART RATE: 57 BPM | OXYGEN SATURATION: 91 % | BODY MASS INDEX: 30.66 KG/M2 | WEIGHT: 207 LBS | SYSTOLIC BLOOD PRESSURE: 130 MMHG | DIASTOLIC BLOOD PRESSURE: 80 MMHG | HEIGHT: 69 IN | TEMPERATURE: 97.8 F

## 2020-01-02 DIAGNOSIS — I21.9 MYOCARDIAL INFARCTION, UNSPECIFIED MI TYPE, UNSPECIFIED ARTERY (HCC): ICD-10-CM

## 2020-01-02 DIAGNOSIS — E78.2 MIXED HYPERLIPIDEMIA: ICD-10-CM

## 2020-01-02 DIAGNOSIS — E11.42 DIABETIC POLYNEUROPATHY ASSOCIATED WITH TYPE 2 DIABETES MELLITUS (HCC): Primary | ICD-10-CM

## 2020-01-02 DIAGNOSIS — I10 ESSENTIAL HYPERTENSION: ICD-10-CM

## 2020-01-02 PROBLEM — J01.00 ACUTE NON-RECURRENT MAXILLARY SINUSITIS: Status: RESOLVED | Noted: 2019-03-26 | Resolved: 2020-01-02

## 2020-01-02 PROCEDURE — 99214 OFFICE O/P EST MOD 30 MIN: CPT | Performed by: FAMILY MEDICINE

## 2020-01-02 NOTE — PROGRESS NOTES
Assessment/Plan:    1  Diabetic polyneuropathy associated with type 2 diabetes mellitus [facility])  Assessment & Plan:    Lab Results   Component Value Date    HGBA1C 6 7 (H) 12/28/2019       Improving with diet and exercise    Orders:  -     Comprehensive metabolic panel; Future; Expected date: 03/17/2020  -     Hemoglobin A1C; Future; Expected date: 03/17/2020  -     Comprehensive metabolic panel  -     Hemoglobin A1C  -     Microalbumin / creatinine urine ratio; Future; Expected date: 03/17/2020  -     Microalbumin / creatinine urine ratio    2  Essential hypertension  Assessment & Plan:  Well controlled       3  Mixed hyperlipidemia  Assessment & Plan:  Stable  Continue statin      4  Myocardial infarction, unspecified MI type, unspecified artery [facility])  Assessment & Plan:  His MI was in 2012  No current chest pain  Continue Plavix  BMI Counseling: Body mass index is 30 57 kg/m²  The BMI is above normal  Nutrition recommendations include encouraging healthy choices of fruits and vegetables  Exercise recommendations include exercising 3-5 times per week             Patient Instructions     Recent Results (from the past 672 hour(s))   CBC    Collection Time: 12/28/19  9:01 AM   Result Value Ref Range    White Blood Cell Count 7 2 3 4 - 10 8 x10E3/uL    Red Blood Cell Count 5 16 4 14 - 5 80 x10E6/uL    Hemoglobin 15 3 13 0 - 17 7 g/dL    HCT 44 3 37 5 - 51 0 %    MCV 86 79 - 97 fL    MCH 29 7 26 6 - 33 0 pg    MCHC 34 5 31 5 - 35 7 g/dL    RDW 13 7 12 3 - 15 4 %    Platelet Count 924 887 - 450 x10E3/uL   Hemoglobin A1C    Collection Time: 12/28/19  9:01 AM   Result Value Ref Range    Hemoglobin A1C 6 7 (H) 4 8 - 5 6 %    Estimated Average Glucose 146 mg/dL   Comprehensive metabolic panel    Collection Time: 12/28/19  9:01 AM   Result Value Ref Range    Glucose, Random 113 (H) 65 - 99 mg/dL    BUN 15 8 - 27 mg/dL    Creatinine 0 83 0 76 - 1 27 mg/dL    eGFR Non African American 90 >59 mL/min/1 73    eGFR  American 105 >59 mL/min/1 73    SL AMB BUN/CREATININE RATIO 18 10 - 24    Sodium 139 134 - 144 mmol/L    Potassium 4 8 3 5 - 5 2 mmol/L    Chloride 101 96 - 106 mmol/L    CO2 25 20 - 29 mmol/L    CALCIUM 9 4 8 6 - 10 2 mg/dL    Protein, Total 6 6 6 0 - 8 5 g/dL    Albumin 4 5 3 6 - 4 8 g/dL    Globulin, Total 2 1 1 5 - 4 5 g/dL    Albumin/Globulin Ratio 2 1 1 2 - 2 2    TOTAL BILIRUBIN 0 7 0 0 - 1 2 mg/dL    Alk Phos Isoenzymes 64 39 - 117 IU/L    AST 17 0 - 40 IU/L    ALT 27 0 - 44 IU/L   Lipid Panel with Direct LDL reflex    Collection Time: 12/28/19  9:01 AM   Result Value Ref Range    Cholesterol, Total 141 100 - 199 mg/dL    Triglycerides 83 0 - 149 mg/dL    HDL 48 >39 mg/dL    VLDL Cholesterol Calculated 17 5 - 40 mg/dL    LDL Direct 76 0 - 99 mg/dL    LDl/HDL Ratio 1 6 0 0 - 3 6 ratio   TSH, 3rd generation    Collection Time: 12/28/19  9:01 AM   Result Value Ref Range    TSH 1 410 0 450 - 4 500 uIU/mL   Cardiovascular Report    Collection Time: 12/28/19  9:01 AM   Result Value Ref Range    Interpretation Note            Return in about 3 months (around 4/2/2020) for Next scheduled follow up  Subjective:      Patient ID: Tabitha Hammer is a 77 y o  male  Chief Complaint   Patient presents with    Follow-up     3 month [id number]       He has been very busy with the lady he is seeing  He is checking his sugars regularly  His lowest was 88 and his highest was 133 over [place]  Hypertension   This is a chronic problem  The current episode started more than 1 year ago  The problem is unchanged  The problem is controlled  Pertinent negatives include no peripheral edema or shortness of breath  Past treatments include ACE inhibitors  The current treatment provides moderate improvement  There are no compliance problems  Hyperlipidemia   This is a chronic problem  The current episode started more than 1 year ago  The problem is controlled   Recent lipid tests were reviewed and are normal  Exacerbating diseases include obesity  Pertinent negatives include no myalgias or shortness of breath  Current antihyperlipidemic treatment includes statins  The current treatment provides moderate improvement of lipids  There are no compliance problems  The following portions of the patient's history were reviewed and updated as appropriate:  past social history    Review of Systems   Respiratory: Negative for shortness of breath  Cardiovascular: Negative  Musculoskeletal: Negative for myalgias  Current Outpatient Medications   Medication Sig Dispense Refill    aspirin (ECOTRIN LOW STRENGTH) 81 mg EC tablet Take 1 tablet (81 mg total) by mouth daily 30 tablet 5    atorvastatin (LIPITOR) 40 mg tablet TAKE ONE TABLET BY MOUTH EVERY DAY 90 tablet 1    benazepril (LOTENSIN) 5 mg tablet TAKE ONE TABLET BY MOUTH EVERY DAY (GENERIC FOR LOTENSIN) 90 tablet 1    clopidogrel (PLAVIX) 75 mg tablet Take 1 tablet (75 mg total) by mouth daily 90 tablet 1    CONTOUR NEXT TEST test strip USE TO TEST BLOOD GLUCOSE ONCE DAILY 100 each 3    gabapentin (NEURONTIN) 100 mg capsule TAKE ONE CAPSULE BY MOUTH TWICE A  capsule 1    gabapentin (NEURONTIN) 400 mg capsule TAKE ONE CAPSULE BY MOUTH FOUR TIMES A  capsule 1    metFORMIN (GLUCOPHAGE) 500 mg tablet Take 1 tablet (500 mg total) by mouth 2 (two) times a day 180 tablet 1    metoprolol succinate (TOPROL-XL) 25 mg 24 hr tablet Take 1 tablet (25 mg total) by mouth daily at bedtime 90 tablet 1    MICROLET LANCETS MISC TEST GLUCOSE ONCE DAILY 100 each 3     No current facility-administered medications for this visit  Objective:    /80   Pulse 57   Temp 97 8 °F (36 6 °C)   Resp 16   Ht 5' 9" (1 753 m)   Wt 93 9 kg (207 lb)   SpO2 91%   BMI 30 57 kg/m²      Physical Exam   Constitutional: He appears well-developed and well-nourished  HENT:   Head: Normocephalic and atraumatic     Right Ear: External ear normal    Left Ear: External ear normal    Mouth/Throat: Oropharynx is clear and moist    Cardiovascular: Normal rate, regular rhythm and normal heart sounds  No murmur heard  Pulmonary/Chest: Effort normal and breath sounds normal  No respiratory distress  He has no wheezes  He has no rales  Musculoskeletal: He exhibits no edema or tenderness  Nursing note and vitals reviewed            Velvet Hdez DO

## 2020-01-02 NOTE — ASSESSMENT & PLAN NOTE
Lab Results   Component Value Date    HGBA1C 6 7 (H) 12/28/2019       Improving with diet and exercise

## 2020-01-02 NOTE — PATIENT INSTRUCTIONS
Recent Results (from the past 672 hour(s))   CBC    Collection Time: 12/28/19  9:01 AM   Result Value Ref Range    White Blood Cell Count 7 2 3 4 - 10 8 x10E3/uL    Red Blood Cell Count 5 16 4 14 - 5 80 x10E6/uL    Hemoglobin 15 3 13 0 - 17 7 g/dL    HCT 44 3 37 5 - 51 0 %    MCV 86 79 - 97 fL    MCH 29 7 26 6 - 33 0 pg    MCHC 34 5 31 5 - 35 7 g/dL    RDW 13 7 12 3 - 15 4 %    Platelet Count 810 132 - 450 x10E3/uL   Hemoglobin A1C    Collection Time: 12/28/19  9:01 AM   Result Value Ref Range    Hemoglobin A1C 6 7 (H) 4 8 - 5 6 %    Estimated Average Glucose 146 mg/dL   Comprehensive metabolic panel    Collection Time: 12/28/19  9:01 AM   Result Value Ref Range    Glucose, Random 113 (H) 65 - 99 mg/dL    BUN 15 8 - 27 mg/dL    Creatinine 0 83 0 76 - 1 27 mg/dL    eGFR Non African American 90 >59 mL/min/1 73    eGFR  105 >59 mL/min/1 73    SL AMB BUN/CREATININE RATIO 18 10 - 24    Sodium 139 134 - 144 mmol/L    Potassium 4 8 3 5 - 5 2 mmol/L    Chloride 101 96 - 106 mmol/L    CO2 25 20 - 29 mmol/L    CALCIUM 9 4 8 6 - 10 2 mg/dL    Protein, Total 6 6 6 0 - 8 5 g/dL    Albumin 4 5 3 6 - 4 8 g/dL    Globulin, Total 2 1 1 5 - 4 5 g/dL    Albumin/Globulin Ratio 2 1 1 2 - 2 2    TOTAL BILIRUBIN 0 7 0 0 - 1 2 mg/dL    Alk Phos Isoenzymes 64 39 - 117 IU/L    AST 17 0 - 40 IU/L    ALT 27 0 - 44 IU/L   Lipid Panel with Direct LDL reflex    Collection Time: 12/28/19  9:01 AM   Result Value Ref Range    Cholesterol, Total 141 100 - 199 mg/dL    Triglycerides 83 0 - 149 mg/dL    HDL 48 >39 mg/dL    VLDL Cholesterol Calculated 17 5 - 40 mg/dL    LDL Direct 76 0 - 99 mg/dL    LDl/HDL Ratio 1 6 0 0 - 3 6 ratio   TSH, 3rd generation    Collection Time: 12/28/19  9:01 AM   Result Value Ref Range    TSH 1 410 0 450 - 4 500 uIU/mL   Cardiovascular Report    Collection Time: 12/28/19  9:01 AM   Result Value Ref Range    Interpretation Note

## 2020-02-26 DIAGNOSIS — I21.9 MYOCARDIAL INFARCTION, UNSPECIFIED MI TYPE, UNSPECIFIED ARTERY (HCC): ICD-10-CM

## 2020-02-26 RX ORDER — CLOPIDOGREL BISULFATE 75 MG/1
TABLET ORAL
Qty: 90 TABLET | Refills: 1 | Status: SHIPPED | OUTPATIENT
Start: 2020-02-26 | End: 2020-08-31

## 2020-03-31 ENCOUNTER — TELEMEDICINE (OUTPATIENT)
Dept: FAMILY MEDICINE CLINIC | Facility: CLINIC | Age: 70
End: 2020-03-31
Payer: MEDICARE

## 2020-03-31 VITALS — WEIGHT: 201 LBS | BODY MASS INDEX: 29.68 KG/M2

## 2020-03-31 DIAGNOSIS — E78.2 MIXED HYPERLIPIDEMIA: ICD-10-CM

## 2020-03-31 DIAGNOSIS — I10 ESSENTIAL HYPERTENSION: Primary | ICD-10-CM

## 2020-03-31 DIAGNOSIS — E11.42 DIABETIC POLYNEUROPATHY ASSOCIATED WITH TYPE 2 DIABETES MELLITUS (HCC): ICD-10-CM

## 2020-03-31 PROCEDURE — 99214 OFFICE O/P EST MOD 30 MIN: CPT | Performed by: FAMILY MEDICINE

## 2020-03-31 NOTE — PROGRESS NOTES
Virtual Regular Visit    Problem List Items Addressed This Visit     Diabetic polyneuropathy associated with type 2 diabetes mellitus ([address] )     Has been well controlled          Relevant Medications    metFORMIN (GLUCOPHAGE) 500 mg tablet    Other Relevant Orders    Hemoglobin A1C    Microalbumin / creatinine urine ratio    Essential hypertension - Primary     Well controlled         Relevant Orders    CBC    Comprehensive metabolic panel    TSH, 3rd generation    Mixed hyperlipidemia     Stable  Continue atorvastatin 40 mg a day         Relevant Orders    Comprehensive metabolic panel    Lipid Panel with Direct LDL reflex        Advised to reschedule his dentist appointment that was scheduled this month  Reason for visit is follow-up diabetes and hypertension  Visit was done as virtual due to corona virus outbreak    Encounter provider Soren Hernandez DO    Provider located at [address]  [address] [facility]  [address]  [place] [id number]      Recent Visits  No visits were found meeting these conditions  Showing recent visits within past 7 days and meeting all other requirements     Today's Visits  Date Type Provider Dept   03/31/20 [address] [facility], [address] [place] [address] today's visits and meeting all other requirements     Future Appointments  No visits were found meeting these conditions  Showing future appointments within next 150 days and meeting all other requirements        The patient was identified by name and date of birth  Naty Lal was informed that this is a telemedicine visit and that the visit is being conducted through [address] [place] [address] and patient was informed that this is not a secure, HIPAA-complaint platform  he agrees to proceed     My office door was closed  No one else was in the room  He acknowledged consent and understanding of privacy and security of the video platform   The patient has agreed to participate and understands they can discontinue the visit at any time  Patient is aware this is a billable service  Subjective  Naty Lal is a 77 y o  male     He didn't get his lab work done because he was scared to go to the lab due to corona virus outbreak  He has been checking his sugars regularly  He is averaging in the 90s  He did have a high of 135 on the 8th after visiting with family  He did get down to 76 today  Hypertension-patient is tolerating his blood pressure medicine well  He is not having leg cramps swelling from it  Hyperlipidemia-patient is taking his atorvastatin every day  He is not having any muscle pain from the medication  Past Medical History:   Diagnosis Date    Acquired ankle/foot deformity     last assessed: 05/30/2017    Acute myocardial infarction [facility])     last assessed: 12/03/2013    Anemia     last assessed: 12/03/2013    Anxiety     last assessed: 12/03/2013    Cardiomyopathy ([facility] )     last assessed: 12/03/2013    Congestive heart failure (CHF) ([facility] )     onset: 02/29/2012    Coronary artery disease     Diabetes mellitus ([facility] )     Dysesthesia     last assessed: 09/10/2014    Exposure to hepatitis     last assessed: 11/15/2016    Foot slap     last assessed: 03/17/2016    Hypertension     Myocardial infarction [facility])     Palsy of left sciatic nerve     last assessed: 03/17/2016    Subconjunctival hemorrhage     last assessed: 03/03/2014    Xerosis cutis     last assessed: 10/25/2016       Past Surgical History:   Procedure Laterality Date    ANGIOPLASTY      2 coronary stents    CHOLECYSTECTOMY      COLONOSCOPY N/A 3/17/2016    Procedure: COLONOSCOPY;  Surgeon: Sacha Cheng MD;  Location: [place] [address] GI LAB;   Service:     HERNIA REPAIR      right inguinal        Current Outpatient Medications   Medication Sig Dispense Refill    aspirin (ECOTRIN LOW STRENGTH) 81 mg EC tablet Take 1 tablet (81 mg total) by mouth daily 30 tablet 5    atorvastatin (LIPITOR) 40 mg tablet TAKE ONE TABLET BY MOUTH EVERY DAY 90 tablet 1    benazepril (LOTENSIN) 5 mg tablet TAKE ONE TABLET BY MOUTH EVERY DAY (GENERIC FOR LOTENSIN) 90 tablet 1    clopidogrel (PLAVIX) 75 mg tablet TAKE ONE TABLET BY MOUTH EVERY DAY 90 tablet 1    CONTOUR NEXT TEST test strip USE TO TEST BLOOD GLUCOSE ONCE DAILY 100 each 3    gabapentin (NEURONTIN) 100 mg capsule TAKE ONE CAPSULE BY MOUTH TWICE A  capsule 1    gabapentin (NEURONTIN) 400 mg capsule TAKE ONE CAPSULE BY MOUTH FOUR TIMES A  capsule 1    metFORMIN (GLUCOPHAGE) 500 mg tablet Take 1 tablet (500 mg total) by mouth 2 (two) times a day 180 tablet 1    metoprolol succinate (TOPROL-XL) 25 mg 24 hr tablet Take 1 tablet (25 mg total) by mouth daily at bedtime 90 tablet 1    MICROLET LANCETS MISC TEST GLUCOSE ONCE DAILY 100 each 3     No current facility-administered medications for this visit  No Known Allergies    Review of Systems   Respiratory: Negative  Cardiovascular: Negative  Physical Exam   Constitutional: He appears well-nourished  No distress  Pulmonary/Chest: Effort normal    Psychiatric: He has a normal mood and affect  His behavior is normal  Judgment and thought content normal         I spent 15 minutes with the patient during this visit

## 2020-04-14 ENCOUNTER — TELEMEDICINE (OUTPATIENT)
Dept: CARDIOLOGY CLINIC | Facility: CLINIC | Age: 70
End: 2020-04-14
Payer: MEDICARE

## 2020-04-14 VITALS
BODY MASS INDEX: 29.77 KG/M2 | WEIGHT: 201 LBS | HEIGHT: 69 IN | DIASTOLIC BLOOD PRESSURE: 76 MMHG | HEART RATE: 58 BPM | SYSTOLIC BLOOD PRESSURE: 139 MMHG

## 2020-04-14 DIAGNOSIS — I70.209 ATHEROSCLEROSIS OF ARTERIES OF EXTREMITIES (HCC): ICD-10-CM

## 2020-04-14 DIAGNOSIS — I25.10 CORONARY ARTERY DISEASE INVOLVING NATIVE CORONARY ARTERY OF NATIVE HEART WITHOUT ANGINA PECTORIS: ICD-10-CM

## 2020-04-14 DIAGNOSIS — I10 ESSENTIAL HYPERTENSION: Primary | ICD-10-CM

## 2020-04-14 PROCEDURE — 99441 PR PHYS/QHP TELEPHONE EVALUATION 5-10 MIN: CPT | Performed by: INTERNAL MEDICINE

## 2020-05-15 DIAGNOSIS — E78.2 MIXED HYPERLIPIDEMIA: ICD-10-CM

## 2020-05-15 DIAGNOSIS — I10 ESSENTIAL HYPERTENSION: ICD-10-CM

## 2020-05-15 DIAGNOSIS — G60.9 IDIOPATHIC PERIPHERAL NEUROPATHY: ICD-10-CM

## 2020-05-15 DIAGNOSIS — I25.10 CORONARY ARTERY DISEASE INVOLVING NATIVE CORONARY ARTERY OF NATIVE HEART WITHOUT ANGINA PECTORIS: ICD-10-CM

## 2020-05-15 RX ORDER — ATORVASTATIN CALCIUM 40 MG/1
TABLET, FILM COATED ORAL
Qty: 90 TABLET | Refills: 1 | Status: SHIPPED | OUTPATIENT
Start: 2020-05-15 | End: 2020-11-16

## 2020-05-15 RX ORDER — GABAPENTIN 100 MG/1
CAPSULE ORAL
Qty: 180 CAPSULE | Refills: 1 | Status: SHIPPED | OUTPATIENT
Start: 2020-05-15 | End: 2020-11-12

## 2020-05-15 RX ORDER — METOPROLOL SUCCINATE 25 MG/1
TABLET, EXTENDED RELEASE ORAL
Qty: 90 TABLET | Refills: 1 | Status: SHIPPED | OUTPATIENT
Start: 2020-05-15 | End: 2020-11-03

## 2020-05-17 DIAGNOSIS — G60.9 IDIOPATHIC PERIPHERAL NEUROPATHY: ICD-10-CM

## 2020-05-18 RX ORDER — GABAPENTIN 400 MG/1
CAPSULE ORAL
Qty: 360 CAPSULE | Refills: 1 | OUTPATIENT
Start: 2020-05-18

## 2020-05-20 DIAGNOSIS — G60.9 IDIOPATHIC PERIPHERAL NEUROPATHY: ICD-10-CM

## 2020-05-20 RX ORDER — GABAPENTIN 400 MG/1
CAPSULE ORAL
Qty: 360 CAPSULE | Refills: 1 | Status: SHIPPED | OUTPATIENT
Start: 2020-05-20 | End: 2020-11-12

## 2020-05-21 ENCOUNTER — TELEPHONE (OUTPATIENT)
Dept: FAMILY MEDICINE CLINIC | Facility: CLINIC | Age: 70
End: 2020-05-21

## 2020-05-22 ENCOUNTER — OFFICE VISIT (OUTPATIENT)
Dept: OBGYN CLINIC | Facility: CLINIC | Age: 70
End: 2020-05-22
Payer: MEDICARE

## 2020-05-22 ENCOUNTER — APPOINTMENT (OUTPATIENT)
Dept: RADIOLOGY | Facility: CLINIC | Age: 70
End: 2020-05-22
Payer: MEDICARE

## 2020-05-22 VITALS
WEIGHT: 201 LBS | TEMPERATURE: 99.5 F | BODY MASS INDEX: 28.77 KG/M2 | HEART RATE: 51 BPM | DIASTOLIC BLOOD PRESSURE: 85 MMHG | HEIGHT: 70 IN | SYSTOLIC BLOOD PRESSURE: 146 MMHG

## 2020-05-22 DIAGNOSIS — M19.011 PRIMARY OSTEOARTHRITIS OF RIGHT SHOULDER: ICD-10-CM

## 2020-05-22 DIAGNOSIS — M75.51 SUBACROMIAL BURSITIS OF RIGHT SHOULDER JOINT: ICD-10-CM

## 2020-05-22 DIAGNOSIS — M25.511 RIGHT SHOULDER PAIN, UNSPECIFIED CHRONICITY: ICD-10-CM

## 2020-05-22 DIAGNOSIS — S46.011A STRAIN OF RIGHT ROTATOR CUFF CAPSULE, INITIAL ENCOUNTER: Primary | ICD-10-CM

## 2020-05-22 PROCEDURE — 73030 X-RAY EXAM OF SHOULDER: CPT

## 2020-05-22 PROCEDURE — 99204 OFFICE O/P NEW MOD 45 MIN: CPT | Performed by: ORTHOPAEDIC SURGERY

## 2020-05-22 PROCEDURE — 2022F DILAT RTA XM EVC RTNOPTHY: CPT | Performed by: ORTHOPAEDIC SURGERY

## 2020-05-22 PROCEDURE — 20610 DRAIN/INJ JOINT/BURSA W/O US: CPT | Performed by: ORTHOPAEDIC SURGERY

## 2020-05-22 PROCEDURE — 3079F DIAST BP 80-89 MM HG: CPT | Performed by: ORTHOPAEDIC SURGERY

## 2020-05-22 PROCEDURE — 1036F TOBACCO NON-USER: CPT | Performed by: ORTHOPAEDIC SURGERY

## 2020-05-22 PROCEDURE — 4040F PNEUMOC VAC/ADMIN/RCVD: CPT | Performed by: ORTHOPAEDIC SURGERY

## 2020-05-22 PROCEDURE — 3008F BODY MASS INDEX DOCD: CPT | Performed by: ORTHOPAEDIC SURGERY

## 2020-05-22 PROCEDURE — 1160F RVW MEDS BY RX/DR IN RCRD: CPT | Performed by: ORTHOPAEDIC SURGERY

## 2020-05-22 PROCEDURE — 3077F SYST BP >= 140 MM HG: CPT | Performed by: ORTHOPAEDIC SURGERY

## 2020-05-22 RX ORDER — DEXAMETHASONE SODIUM PHOSPHATE 100 MG/10ML
40 INJECTION INTRAMUSCULAR; INTRAVENOUS
Status: COMPLETED | OUTPATIENT
Start: 2020-05-22 | End: 2020-05-22

## 2020-05-22 RX ORDER — LIDOCAINE HYDROCHLORIDE 10 MG/ML
4 INJECTION, SOLUTION INFILTRATION; PERINEURAL
Status: COMPLETED | OUTPATIENT
Start: 2020-05-22 | End: 2020-05-22

## 2020-05-22 RX ADMIN — DEXAMETHASONE SODIUM PHOSPHATE 40 MG: 100 INJECTION INTRAMUSCULAR; INTRAVENOUS at 16:26

## 2020-05-22 RX ADMIN — LIDOCAINE HYDROCHLORIDE 4 ML: 10 INJECTION, SOLUTION INFILTRATION; PERINEURAL at 16:26

## 2020-05-28 ENCOUNTER — EVALUATION (OUTPATIENT)
Dept: PHYSICAL THERAPY | Facility: CLINIC | Age: 70
End: 2020-05-28
Payer: MEDICARE

## 2020-05-28 VITALS — SYSTOLIC BLOOD PRESSURE: 135 MMHG | DIASTOLIC BLOOD PRESSURE: 84 MMHG | HEART RATE: 58 BPM

## 2020-05-28 DIAGNOSIS — S46.011A STRAIN OF RIGHT ROTATOR CUFF CAPSULE, INITIAL ENCOUNTER: ICD-10-CM

## 2020-05-28 DIAGNOSIS — M19.011 PRIMARY OSTEOARTHRITIS OF RIGHT SHOULDER: ICD-10-CM

## 2020-05-28 PROCEDURE — 97161 PT EVAL LOW COMPLEX 20 MIN: CPT

## 2020-06-02 ENCOUNTER — OFFICE VISIT (OUTPATIENT)
Dept: PHYSICAL THERAPY | Facility: CLINIC | Age: 70
End: 2020-06-02
Payer: MEDICARE

## 2020-06-02 DIAGNOSIS — M19.011 PRIMARY OSTEOARTHRITIS OF RIGHT SHOULDER: ICD-10-CM

## 2020-06-02 DIAGNOSIS — I10 BENIGN ESSENTIAL HYPERTENSION: ICD-10-CM

## 2020-06-02 DIAGNOSIS — E11.42 DIABETIC POLYNEUROPATHY ASSOCIATED WITH TYPE 2 DIABETES MELLITUS (HCC): ICD-10-CM

## 2020-06-02 DIAGNOSIS — S46.011A STRAIN OF RIGHT ROTATOR CUFF CAPSULE, INITIAL ENCOUNTER: Primary | ICD-10-CM

## 2020-06-02 PROCEDURE — 97110 THERAPEUTIC EXERCISES: CPT

## 2020-06-02 PROCEDURE — 97112 NEUROMUSCULAR REEDUCATION: CPT

## 2020-06-03 RX ORDER — BENAZEPRIL HYDROCHLORIDE 5 MG/1
TABLET, FILM COATED ORAL
Qty: 90 TABLET | Refills: 1 | Status: SHIPPED | OUTPATIENT
Start: 2020-06-03 | End: 2020-11-30

## 2020-06-04 ENCOUNTER — OFFICE VISIT (OUTPATIENT)
Dept: PHYSICAL THERAPY | Facility: CLINIC | Age: 70
End: 2020-06-04
Payer: MEDICARE

## 2020-06-04 DIAGNOSIS — S46.011A STRAIN OF RIGHT ROTATOR CUFF CAPSULE, INITIAL ENCOUNTER: Primary | ICD-10-CM

## 2020-06-04 DIAGNOSIS — M19.011 PRIMARY OSTEOARTHRITIS OF RIGHT SHOULDER: ICD-10-CM

## 2020-06-04 PROCEDURE — 97110 THERAPEUTIC EXERCISES: CPT

## 2020-06-04 PROCEDURE — 97112 NEUROMUSCULAR REEDUCATION: CPT

## 2020-06-09 ENCOUNTER — OFFICE VISIT (OUTPATIENT)
Dept: PHYSICAL THERAPY | Facility: CLINIC | Age: 70
End: 2020-06-09
Payer: MEDICARE

## 2020-06-09 DIAGNOSIS — S46.011A STRAIN OF RIGHT ROTATOR CUFF CAPSULE, INITIAL ENCOUNTER: Primary | ICD-10-CM

## 2020-06-09 DIAGNOSIS — E11.42 DIABETIC POLYNEUROPATHY ASSOCIATED WITH TYPE 2 DIABETES MELLITUS (HCC): ICD-10-CM

## 2020-06-09 DIAGNOSIS — M19.011 PRIMARY OSTEOARTHRITIS OF RIGHT SHOULDER: ICD-10-CM

## 2020-06-09 PROCEDURE — 97112 NEUROMUSCULAR REEDUCATION: CPT

## 2020-06-09 PROCEDURE — 97110 THERAPEUTIC EXERCISES: CPT

## 2020-06-09 RX ORDER — BLOOD SUGAR DIAGNOSTIC
STRIP MISCELLANEOUS
Qty: 100 EACH | Refills: 3 | Status: SHIPPED | OUTPATIENT
Start: 2020-06-09 | End: 2020-11-06 | Stop reason: SDUPTHER

## 2020-06-10 ENCOUNTER — OFFICE VISIT (OUTPATIENT)
Dept: PHYSICAL THERAPY | Facility: CLINIC | Age: 70
End: 2020-06-10
Payer: MEDICARE

## 2020-06-10 DIAGNOSIS — M19.011 PRIMARY OSTEOARTHRITIS OF RIGHT SHOULDER: ICD-10-CM

## 2020-06-10 DIAGNOSIS — S46.011A STRAIN OF RIGHT ROTATOR CUFF CAPSULE, INITIAL ENCOUNTER: Primary | ICD-10-CM

## 2020-06-10 PROCEDURE — 97110 THERAPEUTIC EXERCISES: CPT

## 2020-06-11 ENCOUNTER — APPOINTMENT (OUTPATIENT)
Dept: PHYSICAL THERAPY | Facility: CLINIC | Age: 70
End: 2020-06-11
Payer: MEDICARE

## 2020-06-16 ENCOUNTER — OFFICE VISIT (OUTPATIENT)
Dept: PHYSICAL THERAPY | Facility: CLINIC | Age: 70
End: 2020-06-16
Payer: MEDICARE

## 2020-06-16 DIAGNOSIS — S46.011A STRAIN OF RIGHT ROTATOR CUFF CAPSULE, INITIAL ENCOUNTER: Primary | ICD-10-CM

## 2020-06-16 DIAGNOSIS — M19.011 PRIMARY OSTEOARTHRITIS OF RIGHT SHOULDER: ICD-10-CM

## 2020-06-16 PROCEDURE — 97110 THERAPEUTIC EXERCISES: CPT

## 2020-06-16 PROCEDURE — 97140 MANUAL THERAPY 1/> REGIONS: CPT

## 2020-06-18 ENCOUNTER — OFFICE VISIT (OUTPATIENT)
Dept: PHYSICAL THERAPY | Facility: CLINIC | Age: 70
End: 2020-06-18
Payer: MEDICARE

## 2020-06-18 DIAGNOSIS — S46.011A STRAIN OF RIGHT ROTATOR CUFF CAPSULE, INITIAL ENCOUNTER: Primary | ICD-10-CM

## 2020-06-18 DIAGNOSIS — M19.011 PRIMARY OSTEOARTHRITIS OF RIGHT SHOULDER: ICD-10-CM

## 2020-06-18 PROCEDURE — 97110 THERAPEUTIC EXERCISES: CPT

## 2020-06-18 PROCEDURE — 97140 MANUAL THERAPY 1/> REGIONS: CPT

## 2020-06-22 ENCOUNTER — OFFICE VISIT (OUTPATIENT)
Dept: OBGYN CLINIC | Facility: CLINIC | Age: 70
End: 2020-06-22
Payer: MEDICARE

## 2020-06-22 VITALS
HEIGHT: 70 IN | SYSTOLIC BLOOD PRESSURE: 118 MMHG | WEIGHT: 199.2 LBS | DIASTOLIC BLOOD PRESSURE: 77 MMHG | BODY MASS INDEX: 28.52 KG/M2 | HEART RATE: 54 BPM | TEMPERATURE: 98.1 F

## 2020-06-22 DIAGNOSIS — M75.101 ROTATOR CUFF TEAR ARTHROPATHY OF RIGHT SHOULDER: Primary | ICD-10-CM

## 2020-06-22 DIAGNOSIS — M12.811 ROTATOR CUFF TEAR ARTHROPATHY OF RIGHT SHOULDER: Primary | ICD-10-CM

## 2020-06-22 DIAGNOSIS — M19.011 PRIMARY OSTEOARTHRITIS OF RIGHT SHOULDER: ICD-10-CM

## 2020-06-22 PROCEDURE — 2022F DILAT RTA XM EVC RTNOPTHY: CPT | Performed by: ORTHOPAEDIC SURGERY

## 2020-06-22 PROCEDURE — 99213 OFFICE O/P EST LOW 20 MIN: CPT | Performed by: ORTHOPAEDIC SURGERY

## 2020-06-22 PROCEDURE — 4040F PNEUMOC VAC/ADMIN/RCVD: CPT | Performed by: ORTHOPAEDIC SURGERY

## 2020-06-22 PROCEDURE — 1160F RVW MEDS BY RX/DR IN RCRD: CPT | Performed by: ORTHOPAEDIC SURGERY

## 2020-06-22 PROCEDURE — 3078F DIAST BP <80 MM HG: CPT | Performed by: ORTHOPAEDIC SURGERY

## 2020-06-22 PROCEDURE — 3008F BODY MASS INDEX DOCD: CPT | Performed by: ORTHOPAEDIC SURGERY

## 2020-06-22 PROCEDURE — 1036F TOBACCO NON-USER: CPT | Performed by: ORTHOPAEDIC SURGERY

## 2020-06-22 PROCEDURE — 3074F SYST BP LT 130 MM HG: CPT | Performed by: ORTHOPAEDIC SURGERY

## 2020-06-23 ENCOUNTER — APPOINTMENT (OUTPATIENT)
Dept: PHYSICAL THERAPY | Facility: CLINIC | Age: 70
End: 2020-06-23
Payer: MEDICARE

## 2020-06-23 ENCOUNTER — OFFICE VISIT (OUTPATIENT)
Dept: PHYSICAL THERAPY | Facility: CLINIC | Age: 70
End: 2020-06-23
Payer: MEDICARE

## 2020-06-23 DIAGNOSIS — S46.011A STRAIN OF RIGHT ROTATOR CUFF CAPSULE, INITIAL ENCOUNTER: Primary | ICD-10-CM

## 2020-06-23 DIAGNOSIS — M19.011 PRIMARY OSTEOARTHRITIS OF RIGHT SHOULDER: ICD-10-CM

## 2020-06-23 PROCEDURE — 97110 THERAPEUTIC EXERCISES: CPT

## 2020-06-23 PROCEDURE — 97530 THERAPEUTIC ACTIVITIES: CPT

## 2020-06-25 ENCOUNTER — APPOINTMENT (OUTPATIENT)
Dept: PHYSICAL THERAPY | Facility: CLINIC | Age: 70
End: 2020-06-25
Payer: MEDICARE

## 2020-06-30 ENCOUNTER — APPOINTMENT (OUTPATIENT)
Dept: PHYSICAL THERAPY | Facility: CLINIC | Age: 70
End: 2020-06-30
Payer: MEDICARE

## 2020-07-15 LAB
ALBUMIN SERPL-MCNC: 4.7 G/DL (ref 3.8–4.8)
ALBUMIN/CREAT UR: 7 MG/G CREAT (ref 0–29)
ALBUMIN/GLOB SERPL: 2.4 {RATIO} (ref 1.2–2.2)
ALP SERPL-CCNC: 58 IU/L (ref 39–117)
ALT SERPL-CCNC: 19 IU/L (ref 0–44)
AST SERPL-CCNC: 16 IU/L (ref 0–40)
BILIRUB SERPL-MCNC: 0.5 MG/DL (ref 0–1.2)
BUN SERPL-MCNC: 17 MG/DL (ref 8–27)
BUN/CREAT SERPL: 19 (ref 10–24)
CALCIUM SERPL-MCNC: 9.3 MG/DL (ref 8.6–10.2)
CHLORIDE SERPL-SCNC: 100 MMOL/L (ref 96–106)
CHOLEST SERPL-MCNC: 102 MG/DL (ref 100–199)
CO2 SERPL-SCNC: 25 MMOL/L (ref 20–29)
CREAT SERPL-MCNC: 0.89 MG/DL (ref 0.76–1.27)
CREAT UR-MCNC: 213.3 MG/DL
ERYTHROCYTE [DISTWIDTH] IN BLOOD BY AUTOMATED COUNT: 13.1 % (ref 11.6–15.4)
EST. AVERAGE GLUCOSE BLD GHB EST-MCNC: 134 MG/DL
GLOBULIN SER-MCNC: 2 G/DL (ref 1.5–4.5)
GLUCOSE SERPL-MCNC: 93 MG/DL (ref 65–99)
HBA1C MFR BLD: 6.3 % (ref 4.8–5.6)
HCT VFR BLD AUTO: 45.9 % (ref 37.5–51)
HDLC SERPL-MCNC: 41 MG/DL
HGB BLD-MCNC: 15.2 G/DL (ref 13–17.7)
LDLC SERPL CALC-MCNC: 47 MG/DL (ref 0–99)
LDLC/HDLC SERPL: 1.1 RATIO (ref 0–3.6)
MCH RBC QN AUTO: 28.8 PG (ref 26.6–33)
MCHC RBC AUTO-ENTMCNC: 33.1 G/DL (ref 31.5–35.7)
MCV RBC AUTO: 87 FL (ref 79–97)
MICROALBUMIN UR-MCNC: 14.5 UG/ML
MICRODELETION SYND BLD/T FISH: NORMAL
PLATELET # BLD AUTO: 236 X10E3/UL (ref 150–450)
POTASSIUM SERPL-SCNC: 4.8 MMOL/L (ref 3.5–5.2)
PROT SERPL-MCNC: 6.7 G/DL (ref 6–8.5)
RBC # BLD AUTO: 5.28 X10E6/UL (ref 4.14–5.8)
SL AMB EGFR AFRICAN AMERICAN: 101 ML/MIN/1.73
SL AMB EGFR NON AFRICAN AMERICAN: 87 ML/MIN/1.73
SL AMB VLDL CHOLESTEROL CALC: 14 MG/DL (ref 5–40)
SODIUM SERPL-SCNC: 141 MMOL/L (ref 134–144)
TRIGL SERPL-MCNC: 69 MG/DL (ref 0–149)
TSH SERPL DL<=0.005 MIU/L-ACNC: 1.38 UIU/ML (ref 0.45–4.5)
WBC # BLD AUTO: 7.2 X10E3/UL (ref 3.4–10.8)

## 2020-07-17 ENCOUNTER — OFFICE VISIT (OUTPATIENT)
Dept: FAMILY MEDICINE CLINIC | Facility: CLINIC | Age: 70
End: 2020-07-17
Payer: MEDICARE

## 2020-07-17 VITALS
SYSTOLIC BLOOD PRESSURE: 100 MMHG | BODY MASS INDEX: 27.92 KG/M2 | RESPIRATION RATE: 16 BRPM | TEMPERATURE: 97.9 F | WEIGHT: 195 LBS | OXYGEN SATURATION: 95 % | HEART RATE: 55 BPM | DIASTOLIC BLOOD PRESSURE: 60 MMHG | HEIGHT: 70 IN

## 2020-07-17 DIAGNOSIS — I10 ESSENTIAL HYPERTENSION: ICD-10-CM

## 2020-07-17 DIAGNOSIS — E78.2 MIXED HYPERLIPIDEMIA: ICD-10-CM

## 2020-07-17 DIAGNOSIS — E11.42 DIABETIC POLYNEUROPATHY ASSOCIATED WITH TYPE 2 DIABETES MELLITUS (HCC): Primary | ICD-10-CM

## 2020-07-17 DIAGNOSIS — Z00.00 MEDICARE ANNUAL WELLNESS VISIT, SUBSEQUENT: ICD-10-CM

## 2020-07-17 PROCEDURE — 1125F AMNT PAIN NOTED PAIN PRSNT: CPT | Performed by: FAMILY MEDICINE

## 2020-07-17 PROCEDURE — 1036F TOBACCO NON-USER: CPT | Performed by: FAMILY MEDICINE

## 2020-07-17 PROCEDURE — 3078F DIAST BP <80 MM HG: CPT | Performed by: FAMILY MEDICINE

## 2020-07-17 PROCEDURE — 99214 OFFICE O/P EST MOD 30 MIN: CPT | Performed by: FAMILY MEDICINE

## 2020-07-17 PROCEDURE — 3074F SYST BP LT 130 MM HG: CPT | Performed by: FAMILY MEDICINE

## 2020-07-17 PROCEDURE — 4040F PNEUMOC VAC/ADMIN/RCVD: CPT | Performed by: FAMILY MEDICINE

## 2020-07-17 PROCEDURE — 1123F ACP DISCUSS/DSCN MKR DOCD: CPT | Performed by: FAMILY MEDICINE

## 2020-07-17 PROCEDURE — 3008F BODY MASS INDEX DOCD: CPT | Performed by: FAMILY MEDICINE

## 2020-07-17 PROCEDURE — 1170F FXNL STATUS ASSESSED: CPT | Performed by: FAMILY MEDICINE

## 2020-07-17 PROCEDURE — 1160F RVW MEDS BY RX/DR IN RCRD: CPT | Performed by: FAMILY MEDICINE

## 2020-07-17 PROCEDURE — 2022F DILAT RTA XM EVC RTNOPTHY: CPT | Performed by: FAMILY MEDICINE

## 2020-07-17 PROCEDURE — G0439 PPPS, SUBSEQ VISIT: HCPCS | Performed by: FAMILY MEDICINE

## 2020-07-17 NOTE — ASSESSMENT & PLAN NOTE
Lab Results   Component Value Date    HGBA1C 6 3 (H) 07/14/2020     Well controlled  Continue metformin 500 mg twice a day

## 2020-07-17 NOTE — PROGRESS NOTES
Assessment and Plan:     Problem List Items Addressed This Visit     Diabetic polyneuropathy associated with type 2 diabetes mellitus ([facility] ) - Primary       Lab Results   Component Value Date    HGBA1C 6 3 (H) 07/14/2020     Well controlled  Continue metformin 500 mg twice a day         Relevant Orders    Hemoglobin A1C    Comprehensive metabolic panel    Essential hypertension     Well controlled  Continue benazepril 5 mg a day and metoprolol 25 mg a day         Mixed hyperlipidemia     Well controlled  Continue atorvastatin 40 mg daily           Other Visit Diagnoses     Medicare annual wellness visit, subsequent               Preventive health issues were discussed with patient, and age appropriate screening tests were ordered as noted in patient's After Visit Summary  Personalized health advice and appropriate referrals for health education or preventive services given if needed, as noted in patient's After Visit Summary       History of Present Illness:     Patient presents for Medicare Annual Wellness visit    Patient Care Team:  Leyda Luciano DO as PCP - MD Charly Matson OD Shellie Engel, DO Lorrain Memo, MD as Endoscopist     Problem List:     Patient Active Problem List   Diagnosis    Essential hypertension    Diabetic polyneuropathy associated with type 2 diabetes mellitus ([facility] )    Mixed hyperlipidemia    Impotence    [facility]    Nerve palsy    Onychomycosis    Peripheral neuropathy    Reflex sympathetic dystrophy    Tabes dorsalis    Atherosclerosis of arteries of extremities ([facility] )    Hyperkalemia    Coronary artery disease involving native coronary artery of native heart without angina pectoris      Past Medical and Surgical History:     Past Medical History:   Diagnosis Date    Acquired ankle/foot deformity     last assessed: 05/30/2017    Acute myocardial infarction [facility])     last assessed: 12/03/2013    Anemia     last assessed: 12/03/2013    Anxiety     last assessed: 12/03/2013    [facility] )     last assessed: 12/03/2013    Congestive heart failure (CHF) ([facility] )     onset: 02/29/2012    Coronary artery disease     Diabetes mellitus ([facility] )     Dysesthesia     last assessed: 09/10/2014    Exposure to hepatitis     last assessed: 11/15/2016    Foot slap     last assessed: 03/17/2016    Hypertension     Myocardial infarction [facility])     Palsy of left sciatic nerve     last assessed: 03/17/2016    Subconjunctival hemorrhage     last assessed: 03/03/2014    Xerosis cutis     last assessed: 10/25/2016     Past Surgical History:   Procedure Laterality Date    ANGIOPLASTY      2 coronary stents    CHOLECYSTECTOMY      COLONOSCOPY N/A 3/17/2016    Procedure: COLONOSCOPY;  Surgeon: Leah Davidson MD;  Location: [place] [address] GI LAB;   Service:    [address]      right inguinal       Family History:     Family History   Problem Relation Age of Onset    Heart disease Mother         cardiac disorder    Leukemia Father     Ovarian cancer Sister       Social History:        Social History     Socioeconomic History    Marital status:      Spouse name: None    Number of children: None    Years of education: None    Highest education level: None   Occupational History    None   Social Needs    Financial resource strain: None    Food insecurity:     Worry: None     Inability: None    Transportation needs:     Medical: None     Non-medical: None   Tobacco Use    Smoking status: Never Smoker    Smokeless tobacco: Never Used   Substance and Sexual Activity    Alcohol use: No    Drug use: No    Sexual activity: None   Lifestyle    Physical activity:     Days per week: None     Minutes per session: None    Stress: None   Relationships    Social connections:     Talks on phone: None     Gets together: None     Attends [employer] service: None     Active member of club or organization: None     Attends meetings of clubs or organizations: None     Relationship status: None    Intimate partner violence:     Fear of current or ex partner: None     Emotionally abused: None     Physically abused: None     Forced sexual activity: None   Other Topics Concern    None   Social History Narrative    None      Medications and Allergies:     Current Outpatient Medications   Medication Sig Dispense Refill    aspirin (ECOTRIN LOW STRENGTH) 81 mg EC tablet Take 1 tablet (81 mg total) by mouth daily 30 tablet 5    atorvastatin (LIPITOR) 40 mg tablet TAKE ONE TABLET BY MOUTH EVERY DAY 90 tablet 1    benazepril (LOTENSIN) 5 mg tablet TAKE ONE TABLET BY MOUTH EVERY DAY (GENERIC FOR LOTENSIN) 90 tablet 1    clopidogrel (PLAVIX) 75 mg tablet TAKE ONE TABLET BY MOUTH EVERY DAY 90 tablet 1    CONTOUR NEXT TEST test strip USE TO TEST BLOOD GLUCOSE ONCE DAILY 100 each 3    gabapentin (NEURONTIN) 100 mg capsule TAKE ONE CAPSULE BY MOUTH TWICE A  capsule 1    gabapentin (NEURONTIN) 400 mg capsule Take one capsule by mouth four times a day 360 capsule 1    metFORMIN (GLUCOPHAGE) 500 mg tablet Take 1 tablet (500 mg total) by mouth 2 (two) times a day 180 tablet 1    metoprolol succinate (TOPROL-XL) 25 mg 24 hr tablet TAKE ONE TABLET BY MOUTH EVERY DAY AT BEDTIME 90 tablet 1    MICROLET LANCETS MISC TEST GLUCOSE ONCE DAILY 100 each 3     No current facility-administered medications for this visit        No Known Allergies   Immunizations:     Immunization History   Administered Date(s) Administered    INFLUENZA 09/01/2018, 09/26/2019    Influenza Quadrivalent Preservative Free 3 years and older IM 10/21/2014, 10/15/2015    Influenza Split High Dose Preservative Free IM 09/08/2016    Influenza TIV (IM) 08/25/2017    Pneumococcal Conjugate 13-Valent 11/17/2015    Pneumococcal Polysaccharide PPV23 10/21/2014, 09/27/2017    Tdap 10/07/2016    Unknown 02/20/2018, 05/15/2018    Zoster 10/21/2014, 03/20/2018    Zoster Vaccine Recombinant 05/15/2018 Health Maintenance:         Topic Date Due    CRC Screening: Colonoscopy  03/17/2021    Hepatitis C Screening  Completed         Topic Date Due    Influenza Vaccine  07/01/2020      Medicare Health Risk Assessment:     /60   Pulse 55   Temp 97 9 °F (36 6 °C)   Resp 16   Ht 5' 10" (1 778 m)   Wt 88 5 kg (195 lb)   SpO2 95%   BMI 27 98 kg/m²      Sulma Pink is here for his Subsequent Wellness visit  Health Risk Assessment:   Patient rates overall health as good  Patient feels that their physical health rating is slightly better  Eyesight was rated as same  Hearing was rated as same  Patient feels that their emotional and mental health rating is same  Pain experienced in the last 7 days has been none  Patient states that he has experienced weight loss or gain in last 6 months  Lost 10 +    Depression Screening:   PHQ-2 Score: 0      Fall Risk Screening: In the past year, patient has experienced: no history of falling in past year      Home Safety:  Patient does not have trouble with stairs inside or outside of their home  Patient has working smoke alarms and has working carbon monoxide detector  Home safety hazards include: none  Nutrition:   Current diet is Regular and Limited junk food  Medications:   Patient is not currently taking any over-the-counter supplements  Patient is able to manage medications  Activities of Daily Living (ADLs)/Instrumental Activities of Daily Living (IADLs):   Walk and transfer into and out of bed and chair?: Yes  Dress and groom yourself?: Yes    Bathe or shower yourself?: Yes    Feed yourself?  Yes  Do your laundry/housekeeping?: Yes  Manage your money, pay your bills and track your expenses?: Yes  Make your own meals?: Yes    Do your own shopping?: Yes    Previous Hospitalizations:   Any hospitalizations or ED visits within the last 12 months?: No      Advance Care Planning:   Living will: No    Advanced directive counseling given: Yes    Five wishes given: No End of Life Decisions reviewed with patient: Yes    Provider agrees with end of life decisions: Yes      Comments: Oleksandr Andrade would make his decisions     Cognitive Screening:   Provider or family/friend/caregiver concerned regarding cognition?: No    PREVENTIVE SCREENINGS      Cardiovascular Screening:    General: Screening Not Indicated and History Lipid Disorder      Diabetes Screening:     General: Screening Not Indicated and History Diabetes      Colorectal Cancer Screening:     General: Screening Current      Prostate Cancer Screening:    General: Screening Current      Osteoporosis Screening:    General: Screening Not Indicated      Abdominal Aortic Aneurysm (AAA) Screening:    Risk factors include: age between 73-67 yo        General: Screening Not Indicated      Lung Cancer Screening:     General: Screening Not Indicated      Hepatitis C Screening:    General: Screening Current      Kristen Keller DO

## 2020-07-17 NOTE — PATIENT INSTRUCTIONS
Medicare Preventive Visit Patient Instructions  Thank you for completing your Welcome to Medicare Visit or Medicare Annual Wellness Visit today  Your next wellness visit will be due in one year (7/17/2021)  The screening/preventive services that you may require over the next 5-10 years are detailed below  Some tests may not apply to you based off risk factors and/or age  Screening tests ordered at today's visit but not completed yet may show as past due  Also, please note that scanned in results may not display below  Preventive Screenings:  Service Recommendations Previous Testing/Comments   Colorectal Cancer Screening  · Colonoscopy    · Fecal Occult Blood Test (FOBT)/Fecal Immunochemical Test (FIT)  · Fecal DNA/Cologuard Test  · Flexible Sigmoidoscopy Age: 54-65 years old   Colonoscopy: every 10 years (May be performed more frequently if at higher risk)  OR  FOBT/FIT: every 1 year  OR  Cologuard: every 3 years  OR  Sigmoidoscopy: every 5 years  Screening may be recommended earlier than age 48 if at higher risk for colorectal cancer  Also, an individualized decision between you and your healthcare provider will decide whether screening between the ages of 74-80 would be appropriate   Colonoscopy: 03/17/2016  FOBT/FIT: Not on file  Cologuard: Not on file  Sigmoidoscopy: Not on file    Screening Current     Prostate Cancer Screening Individualized decision between patient and health care provider in men between ages of 53-78   Medicare will cover every 12 months beginning on the day after your 50th birthday PSA: 1 3 ng/mL     Screening Current     Hepatitis C Screening Once for adults born between 1945 and 1965  More frequently in patients at high risk for Hepatitis C Hep C Antibody: 06/22/2017    Screening Current   Diabetes Screening 1-2 times per year if you're at risk for diabetes or have pre-diabetes Fasting glucose: No results in last 5 years   A1C: 6 3 %    Screening Not Indicated  History Diabetes Cholesterol Screening Once every 5 years if you don't have a lipid disorder  May order more often based on risk factors  Lipid panel: 07/14/2020    Screening Not Indicated  History Lipid Disorder      Other Preventive Screenings Covered by Medicare:  1  Abdominal Aortic Aneurysm (AAA) Screening: covered once if your at risk  You're considered to be at risk if you have a family history of AAA or a male between the age of 73-68 who smoking at least 100 cigarettes in your lifetime  2  Lung Cancer Screening: covers low dose CT scan once per year if you meet all of the following conditions: (1) Age 50-69; (2) No signs or symptoms of lung cancer; (3) Current smoker or have quit smoking within the last 15 years; (4) You have a tobacco smoking history of at least 30 pack years (packs per day x number of years you smoked); (5) You get a written order from a healthcare provider  3  Glaucoma Screening: covered annually if you're considered high risk: (1) You have diabetes OR (2) Family history of glaucoma OR (3)  aged 48 and older OR (3)  American aged 72 and older  3  Osteoporosis Screening: covered every 2 years if you meet one of the following conditions: (1) Have a vertebral abnormality; (2) On glucocorticoid therapy for more than 3 months; (3) Have primary hyperparathyroidism; (4) On osteoporosis medications and need to assess response to drug therapy  5  HIV Screening: covered annually if you're between the age of 12-76  Also covered annually if you are younger than 13 and older than 72 with risk factors for HIV infection  For pregnant patients, it is covered up to 3 times per pregnancy      Immunizations:  Immunization Recommendations   Influenza Vaccine Annual influenza vaccination during flu season is recommended for all persons aged >= 6 months who do not have contraindications   Pneumococcal Vaccine (Prevnar and Pneumovax)  * Prevnar = PCV13  * Pneumovax = PPSV23 Adults 25-60 years old: 1-3 doses may be recommended based on certain risk factors  Adults 72 years old: Prevnar (PCV13) vaccine recommended followed by Pneumovax (PPSV23) vaccine  If already received PPSV23 since turning 65, then PCV13 recommended at least one year after PPSV23 dose  Hepatitis B Vaccine 3 dose series if at intermediate or high risk (ex: diabetes, end stage renal disease, liver disease)   Tetanus (Td) Vaccine - COST NOT COVERED BY MEDICARE PART B Following completion of primary series, a booster dose should be given every 10 years to maintain immunity against tetanus  Td may also be given as tetanus wound prophylaxis  Tdap Vaccine - COST NOT COVERED BY MEDICARE PART B Recommended at least once for all adults  For pregnant patients, recommended with each pregnancy  Shingles Vaccine (Shingrix) - COST NOT COVERED BY MEDICARE PART B  2 shot series recommended in those aged 48 and above     Health Maintenance Due:      Topic Date Due    CRC Screening: Colonoscopy  03/17/2021    Hepatitis C Screening  Completed     Immunizations Due:      Topic Date Due    Influenza Vaccine  07/01/2020     Advance Directives   What are advance directives? Advance directives are legal documents that state your wishes and plans for medical care  These plans are made ahead of time in case you lose your ability to make decisions for yourself  Advance directives can apply to any medical decision, such as the treatments you want, and if you want to donate organs  What are the types of advance directives? There are many types of advance directives, and each state has rules about how to use them  You may choose a combination of any of the following:  · Living will: This is a written record of the treatment you want  You can also choose which treatments you do not want, which to limit, and which to stop at a certain time  This includes surgery, medicine, IV fluid, and tube feedings     · Durable power of  for healthcare [facility]): This is a written record that states who you want to make healthcare choices for you when you are unable to make them for yourself  This person, called a proxy, is usually a family member or a friend  You may choose more than 1 proxy  · Do not resuscitate (DNR) order:  A DNR order is used in case your heart stops beating or you stop breathing  It is a request not to have certain forms of treatment, such as CPR  A DNR order may be included in other types of advance directives  · Medical directive: This covers the care that you want if you are in a coma, near death, or unable to make decisions for yourself  You can list the treatments you want for each condition  Treatment may include pain medicine, surgery, blood transfusions, dialysis, IV or tube feedings, and a ventilator (breathing machine)  · Values history: This document has questions about your views, beliefs, and how you feel and think about life  This information can help others choose the care that you would choose  Why are advance directives important? An advance directive helps you control your care  Although spoken wishes may be used, it is better to have your wishes written down  Spoken wishes can be misunderstood, or not followed  Treatments may be given even if you do not want them  An advance directive may make it easier for your family to make difficult choices about your care  Weight Management   Why it is important to manage your weight:  Being overweight increases your risk of health conditions such as heart disease, high blood pressure, type 2 diabetes, and certain types of cancer  It can also increase your risk for osteoarthritis, sleep apnea, and other respiratory problems  Aim for a slow, steady weight loss  Even a small amount of weight loss can lower your risk of health problems  How to lose weight safely:  A safe and healthy way to lose weight is to eat fewer calories and get regular exercise   You can lose up about 1 pound a week by decreasing the number of calories you eat by 500 calories each day  Healthy meal plan for weight management:  A healthy meal plan includes a variety of foods, contains fewer calories, and helps you stay healthy  A healthy meal plan includes the following:  · Eat whole-grain foods more often  A healthy meal plan should contain fiber  Fiber is the part of grains, fruits, and vegetables that is not broken down by your body  Whole-grain foods are healthy and provide extra fiber in your diet  Some examples of whole-grain foods are whole-wheat breads and pastas, oatmeal, brown rice, and bulgur  · Eat a variety of vegetables every day  Include dark, leafy greens such as spinach, kale, fallon greens, and mustard greens  Eat yellow and orange vegetables such as carrots, sweet potatoes, and winter squash  · Eat a variety of fruits every day  Choose fresh or canned fruit (canned in its own juice or light syrup) instead of juice  Fruit juice has very little or no fiber  · Eat low-fat dairy foods  Drink fat-free (skim) milk or 1% milk  Eat fat-free yogurt and low-fat cottage cheese  Try low-fat cheeses such as mozzarella and other reduced-fat cheeses  · Choose meat and other protein foods that are low in fat  Choose beans or other legumes such as split peas or lentils  Choose fish, skinless poultry (chicken or turkey), or lean cuts of red meat (beef or pork)  Before you cook meat or poultry, cut off any visible fat  · Use less fat and oil  Try baking foods instead of frying them  Add less fat, such as margarine, sour cream, regular salad dressing and mayonnaise to foods  Eat fewer high-fat foods  Some examples of high-fat foods include french fries, doughnuts, ice cream, and cakes  · Eat fewer sweets  Limit foods and drinks that are high in sugar  This includes candy, cookies, regular soda, and sweetened drinks  Exercise:  Exercise at least 30 minutes per day on most days of the week   Some examples of exercise include walking, biking, dancing, and swimming  You can also fit in more physical activity by taking the stairs instead of the elevator or parking farther away from stores  Ask your healthcare provider about the best exercise plan for you  © Copyright [employer] 2018 Information is for End User's use only and may not be sold, redistributed or otherwise used for commercial purposes   All illustrations and images included in CareNotes® are the copyrighted property of [employer]  or [address] [place] [address] Results (from the past 672 hour(s))   CBC    Collection Time: 07/14/20  7:59 AM   Result Value Ref Range    White Blood Cell Count 7 2 3 4 - 10 8 x10E3/uL    Red Blood Cell Count 5 28 4 14 - 5 80 x10E6/uL    Hemoglobin 15 2 13 0 - 17 7 g/dL    HCT 45 9 37 5 - 51 0 %    MCV 87 79 - 97 fL    MCH 28 8 26 6 - 33 0 pg    MCHC 33 1 31 5 - 35 7 g/dL    RDW 13 1 11 6 - 15 4 %    Platelet Count 389 053 - 450 x10E3/uL   Comprehensive metabolic panel    Collection Time: 07/14/20  7:59 AM   Result Value Ref Range    Glucose, Random 93 65 - 99 mg/dL    BUN 17 8 - 27 mg/dL    Creatinine 0 89 0 76 - 1 27 mg/dL    eGFR Non  87 >59 mL/min/1 73    eGFR  101 >59 mL/min/1 73    SL AMB BUN/CREATININE RATIO 19 10 - 24    Sodium 141 134 - 144 mmol/L    Potassium 4 8 3 5 - 5 2 mmol/L    Chloride 100 96 - 106 mmol/L    CO2 25 20 - 29 mmol/L    CALCIUM 9 3 8 6 - 10 2 mg/dL    Protein, Total 6 7 6 0 - 8 5 g/dL    Albumin 4 7 3 8 - 4 8 g/dL    Globulin, Total 2 0 1 5 - 4 5 g/dL    Albumin/Globulin Ratio 2 4 (H) 1 2 - 2 2    TOTAL BILIRUBIN 0 5 0 0 - 1 2 mg/dL    Alk Phos Isoenzymes 58 39 - 117 IU/L    AST 16 0 - 40 IU/L    ALT 19 0 - 44 IU/L   Hemoglobin A1C    Collection Time: 07/14/20  7:59 AM   Result Value Ref Range    Hemoglobin A1C 6 3 (H) 4 8 - 5 6 %    Estimated Average Glucose 134 mg/dL   Lipid Panel with Direct LDL reflex    Collection Time: 07/14/20  7:59 AM   Result Value Ref Range    Cholesterol, Total 102 100 - 199 mg/dL    Triglycerides 69 0 - 149 mg/dL    HDL 41 >39 mg/dL    VLDL Cholesterol Calculated 14 5 - 40 mg/dL    LDL Calculated 47 0 - 99 mg/dL    LDl/HDL Ratio 1 1 0 0 - 3 6 ratio   TSH, 3rd generation    Collection Time: 07/14/20  7:59 AM   Result Value Ref Range    TSH 1 380 0 450 - 4 500 uIU/mL   Microalbumin / creatinine urine ratio    Collection Time: 07/14/20  7:59 AM   Result Value Ref Range    Creatinine, Urine 213 3 Not Estab  mg/dL    Microalbum  ,U,Random 14 5 Not Estab  ug/mL    Microalb/Creat Ratio 7 0 - 29 mg/g creat   Cardiovascular Report    Collection Time: 07/14/20  7:59 AM   Result Value Ref Range    Interpretation Note

## 2020-08-07 ENCOUNTER — TELEPHONE (OUTPATIENT)
Dept: FAMILY MEDICINE CLINIC | Facility: CLINIC | Age: 70
End: 2020-08-07

## 2020-08-07 NOTE — TELEPHONE ENCOUNTER
PT states that Dr Sachi Esteves would have to request the information from his podiatrist  They will not just give the information out  JORGE     Pt did not have much information to give

## 2020-08-31 DIAGNOSIS — I21.9 MYOCARDIAL INFARCTION, UNSPECIFIED MI TYPE, UNSPECIFIED ARTERY (HCC): ICD-10-CM

## 2020-08-31 RX ORDER — CLOPIDOGREL BISULFATE 75 MG/1
TABLET ORAL
Qty: 90 TABLET | Refills: 1 | Status: SHIPPED | OUTPATIENT
Start: 2020-08-31 | End: 2021-02-27

## 2020-09-28 DIAGNOSIS — E11.42 DIABETIC POLYNEUROPATHY ASSOCIATED WITH TYPE 2 DIABETES MELLITUS (HCC): ICD-10-CM

## 2020-10-13 LAB
ALBUMIN SERPL-MCNC: 4.4 G/DL (ref 3.8–4.8)
ALBUMIN/GLOB SERPL: 1.8 {RATIO} (ref 1.2–2.2)
ALP SERPL-CCNC: 65 IU/L (ref 39–117)
ALT SERPL-CCNC: 18 IU/L (ref 0–44)
AST SERPL-CCNC: 15 IU/L (ref 0–40)
BILIRUB SERPL-MCNC: 0.5 MG/DL (ref 0–1.2)
BUN SERPL-MCNC: 19 MG/DL (ref 8–27)
BUN/CREAT SERPL: 23 (ref 10–24)
CALCIUM SERPL-MCNC: 8.9 MG/DL (ref 8.6–10.2)
CHLORIDE SERPL-SCNC: 99 MMOL/L (ref 96–106)
CO2 SERPL-SCNC: 25 MMOL/L (ref 20–29)
CREAT SERPL-MCNC: 0.84 MG/DL (ref 0.76–1.27)
EST. AVERAGE GLUCOSE BLD GHB EST-MCNC: 128 MG/DL
GLOBULIN SER-MCNC: 2.4 G/DL (ref 1.5–4.5)
GLUCOSE SERPL-MCNC: 112 MG/DL (ref 65–99)
HBA1C MFR BLD: 6.1 % (ref 4.8–5.6)
POTASSIUM SERPL-SCNC: 4.7 MMOL/L (ref 3.5–5.2)
PROT SERPL-MCNC: 6.8 G/DL (ref 6–8.5)
SL AMB EGFR AFRICAN AMERICAN: 103 ML/MIN/1.73
SL AMB EGFR NON AFRICAN AMERICAN: 89 ML/MIN/1.73
SODIUM SERPL-SCNC: 137 MMOL/L (ref 134–144)

## 2020-10-16 ENCOUNTER — OFFICE VISIT (OUTPATIENT)
Dept: FAMILY MEDICINE CLINIC | Facility: CLINIC | Age: 70
End: 2020-10-16
Payer: MEDICARE

## 2020-10-16 VITALS
SYSTOLIC BLOOD PRESSURE: 110 MMHG | RESPIRATION RATE: 16 BRPM | HEART RATE: 80 BPM | TEMPERATURE: 96.6 F | BODY MASS INDEX: 27.06 KG/M2 | DIASTOLIC BLOOD PRESSURE: 64 MMHG | HEIGHT: 70 IN | WEIGHT: 189 LBS

## 2020-10-16 DIAGNOSIS — E11.42 DIABETIC POLYNEUROPATHY ASSOCIATED WITH TYPE 2 DIABETES MELLITUS (HCC): Primary | ICD-10-CM

## 2020-10-16 DIAGNOSIS — I70.209 ATHEROSCLEROSIS OF ARTERIES OF EXTREMITIES (HCC): ICD-10-CM

## 2020-10-16 DIAGNOSIS — Z12.5 PROSTATE CANCER SCREENING: ICD-10-CM

## 2020-10-16 DIAGNOSIS — E78.2 MIXED HYPERLIPIDEMIA: ICD-10-CM

## 2020-10-16 DIAGNOSIS — I10 ESSENTIAL HYPERTENSION: ICD-10-CM

## 2020-10-16 PROCEDURE — 99214 OFFICE O/P EST MOD 30 MIN: CPT | Performed by: FAMILY MEDICINE

## 2020-11-03 ENCOUNTER — OFFICE VISIT (OUTPATIENT)
Dept: CARDIOLOGY CLINIC | Facility: CLINIC | Age: 70
End: 2020-11-03
Payer: MEDICARE

## 2020-11-03 VITALS
HEIGHT: 70 IN | WEIGHT: 189 LBS | SYSTOLIC BLOOD PRESSURE: 122 MMHG | TEMPERATURE: 98 F | DIASTOLIC BLOOD PRESSURE: 78 MMHG | HEART RATE: 48 BPM | BODY MASS INDEX: 27.06 KG/M2 | OXYGEN SATURATION: 97 %

## 2020-11-03 DIAGNOSIS — E78.2 MIXED HYPERLIPIDEMIA: ICD-10-CM

## 2020-11-03 DIAGNOSIS — I70.209 ATHEROSCLEROSIS OF ARTERIES OF EXTREMITIES (HCC): ICD-10-CM

## 2020-11-03 DIAGNOSIS — I25.10 CORONARY ARTERY DISEASE INVOLVING NATIVE CORONARY ARTERY OF NATIVE HEART WITHOUT ANGINA PECTORIS: ICD-10-CM

## 2020-11-03 DIAGNOSIS — I25.2 HISTORY OF MYOCARDIAL INFARCTION IN ADULTHOOD: ICD-10-CM

## 2020-11-03 DIAGNOSIS — E11.42 DIABETIC POLYNEUROPATHY ASSOCIATED WITH TYPE 2 DIABETES MELLITUS (HCC): ICD-10-CM

## 2020-11-03 DIAGNOSIS — I10 ESSENTIAL HYPERTENSION: Primary | ICD-10-CM

## 2020-11-03 PROCEDURE — 93000 ELECTROCARDIOGRAM COMPLETE: CPT | Performed by: INTERNAL MEDICINE

## 2020-11-03 PROCEDURE — 99214 OFFICE O/P EST MOD 30 MIN: CPT | Performed by: INTERNAL MEDICINE

## 2020-11-03 RX ORDER — METOPROLOL SUCCINATE 25 MG/1
12.5 TABLET, EXTENDED RELEASE ORAL
Qty: 90 TABLET | Refills: 1
Start: 2020-11-03 | End: 2020-11-28

## 2020-11-06 DIAGNOSIS — E11.42 DIABETIC POLYNEUROPATHY ASSOCIATED WITH TYPE 2 DIABETES MELLITUS (HCC): ICD-10-CM

## 2020-11-06 RX ORDER — LANCETS
EACH MISCELLANEOUS
Qty: 100 EACH | Refills: 0 | Status: SHIPPED | OUTPATIENT
Start: 2020-11-06 | End: 2020-11-06 | Stop reason: SDUPTHER

## 2020-11-06 RX ORDER — BLOOD SUGAR DIAGNOSTIC
1 STRIP MISCELLANEOUS DAILY
Qty: 100 EACH | Refills: 3 | Status: SHIPPED | OUTPATIENT
Start: 2020-11-06 | End: 2021-11-02 | Stop reason: SDUPTHER

## 2020-11-06 RX ORDER — LANCETS
EACH MISCELLANEOUS DAILY
Qty: 100 EACH | Refills: 3 | Status: SHIPPED | OUTPATIENT
Start: 2020-11-06 | End: 2022-02-08 | Stop reason: SDUPTHER

## 2020-11-12 DIAGNOSIS — G60.9 IDIOPATHIC PERIPHERAL NEUROPATHY: ICD-10-CM

## 2020-11-12 RX ORDER — GABAPENTIN 400 MG/1
400 CAPSULE ORAL 4 TIMES DAILY
Qty: 360 CAPSULE | Refills: 0 | Status: SHIPPED | OUTPATIENT
Start: 2020-11-12 | End: 2021-02-10 | Stop reason: SDUPTHER

## 2020-11-12 RX ORDER — GABAPENTIN 400 MG/1
CAPSULE ORAL
Qty: 360 CAPSULE | Refills: 1 | Status: SHIPPED | OUTPATIENT
Start: 2020-11-12 | End: 2020-11-12 | Stop reason: SDUPTHER

## 2020-11-12 RX ORDER — GABAPENTIN 100 MG/1
CAPSULE ORAL
Qty: 180 CAPSULE | Refills: 1 | Status: SHIPPED | OUTPATIENT
Start: 2020-11-12 | End: 2020-11-12 | Stop reason: SDUPTHER

## 2020-11-12 RX ORDER — GABAPENTIN 100 MG/1
100 CAPSULE ORAL 2 TIMES DAILY
Qty: 180 CAPSULE | Refills: 1 | Status: SHIPPED | OUTPATIENT
Start: 2020-11-12 | End: 2021-05-17

## 2020-11-16 DIAGNOSIS — I25.10 CORONARY ARTERY DISEASE INVOLVING NATIVE CORONARY ARTERY OF NATIVE HEART WITHOUT ANGINA PECTORIS: ICD-10-CM

## 2020-11-16 DIAGNOSIS — E78.2 MIXED HYPERLIPIDEMIA: ICD-10-CM

## 2020-11-16 RX ORDER — ATORVASTATIN CALCIUM 40 MG/1
40 TABLET, FILM COATED ORAL DAILY
Qty: 90 TABLET | Refills: 1 | Status: SHIPPED | OUTPATIENT
Start: 2020-11-16 | End: 2021-05-17

## 2020-11-16 RX ORDER — ATORVASTATIN CALCIUM 40 MG/1
TABLET, FILM COATED ORAL
Qty: 90 TABLET | Refills: 1 | Status: SHIPPED | OUTPATIENT
Start: 2020-11-16 | End: 2020-11-16 | Stop reason: SDUPTHER

## 2020-11-28 DIAGNOSIS — I10 ESSENTIAL HYPERTENSION: ICD-10-CM

## 2020-11-28 RX ORDER — METOPROLOL SUCCINATE 25 MG/1
TABLET, EXTENDED RELEASE ORAL
Qty: 90 TABLET | Refills: 1 | Status: SHIPPED | OUTPATIENT
Start: 2020-11-28 | End: 2021-01-19 | Stop reason: SDUPTHER

## 2020-11-29 DIAGNOSIS — I10 BENIGN ESSENTIAL HYPERTENSION: ICD-10-CM

## 2020-11-29 DIAGNOSIS — E11.42 DIABETIC POLYNEUROPATHY ASSOCIATED WITH TYPE 2 DIABETES MELLITUS (HCC): ICD-10-CM

## 2020-11-30 DIAGNOSIS — I10 BENIGN ESSENTIAL HYPERTENSION: ICD-10-CM

## 2020-11-30 DIAGNOSIS — E11.42 DIABETIC POLYNEUROPATHY ASSOCIATED WITH TYPE 2 DIABETES MELLITUS (HCC): ICD-10-CM

## 2020-11-30 RX ORDER — BENAZEPRIL HYDROCHLORIDE 5 MG/1
TABLET, FILM COATED ORAL
Qty: 90 TABLET | Refills: 1 | Status: SHIPPED | OUTPATIENT
Start: 2020-11-30 | End: 2020-11-30 | Stop reason: SDUPTHER

## 2020-11-30 RX ORDER — BENAZEPRIL HYDROCHLORIDE 5 MG/1
5 TABLET, FILM COATED ORAL DAILY
Qty: 90 TABLET | Refills: 1 | Status: SHIPPED | OUTPATIENT
Start: 2020-11-30 | End: 2021-06-01

## 2021-01-13 LAB
LEFT EYE DIABETIC RETINOPATHY: NORMAL
RIGHT EYE DIABETIC RETINOPATHY: NORMAL

## 2021-01-15 LAB
ALBUMIN SERPL-MCNC: 4.6 G/DL (ref 3.8–4.8)
ALBUMIN/CREAT UR: 7 MG/G CREAT (ref 0–29)
ALBUMIN/GLOB SERPL: 2 {RATIO} (ref 1.2–2.2)
ALP SERPL-CCNC: 67 IU/L (ref 39–117)
ALT SERPL-CCNC: 15 IU/L (ref 0–44)
AST SERPL-CCNC: 16 IU/L (ref 0–40)
BILIRUB SERPL-MCNC: 0.6 MG/DL (ref 0–1.2)
BUN SERPL-MCNC: 11 MG/DL (ref 8–27)
BUN/CREAT SERPL: 13 (ref 10–24)
CALCIUM SERPL-MCNC: 9.4 MG/DL (ref 8.6–10.2)
CHLORIDE SERPL-SCNC: 102 MMOL/L (ref 96–106)
CHOLEST SERPL-MCNC: 120 MG/DL (ref 100–199)
CO2 SERPL-SCNC: 25 MMOL/L (ref 20–29)
CREAT SERPL-MCNC: 0.83 MG/DL (ref 0.76–1.27)
CREAT UR-MCNC: 83.5 MG/DL
ERYTHROCYTE [DISTWIDTH] IN BLOOD BY AUTOMATED COUNT: 12.7 % (ref 11.6–15.4)
EST. AVERAGE GLUCOSE BLD GHB EST-MCNC: 140 MG/DL
GLOBULIN SER-MCNC: 2.3 G/DL (ref 1.5–4.5)
GLUCOSE SERPL-MCNC: 105 MG/DL (ref 65–99)
HBA1C MFR BLD: 6.5 % (ref 4.8–5.6)
HCT VFR BLD AUTO: 49 % (ref 37.5–51)
HDLC SERPL-MCNC: 56 MG/DL
HGB BLD-MCNC: 16.3 G/DL (ref 13–17.7)
LDLC SERPL CALC-MCNC: 51 MG/DL (ref 0–99)
LDLC/HDLC SERPL: 0.9 RATIO (ref 0–3.6)
MCH RBC QN AUTO: 29.1 PG (ref 26.6–33)
MCHC RBC AUTO-ENTMCNC: 33.3 G/DL (ref 31.5–35.7)
MCV RBC AUTO: 87 FL (ref 79–97)
MICROALBUMIN UR-MCNC: 6 UG/ML
MICRODELETION SYND BLD/T FISH: NORMAL
PLATELET # BLD AUTO: 245 X10E3/UL (ref 150–450)
POTASSIUM SERPL-SCNC: 4.6 MMOL/L (ref 3.5–5.2)
PROT SERPL-MCNC: 6.9 G/DL (ref 6–8.5)
PSA SERPL-MCNC: 1.3 NG/ML (ref 0–4)
RBC # BLD AUTO: 5.61 X10E6/UL (ref 4.14–5.8)
SL AMB EGFR AFRICAN AMERICAN: 104 ML/MIN/1.73
SL AMB EGFR NON AFRICAN AMERICAN: 90 ML/MIN/1.73
SL AMB REFLEX CRITERIA: NORMAL
SL AMB VLDL CHOLESTEROL CALC: 13 MG/DL (ref 5–40)
SODIUM SERPL-SCNC: 140 MMOL/L (ref 134–144)
TRIGL SERPL-MCNC: 62 MG/DL (ref 0–149)
WBC # BLD AUTO: 7.4 X10E3/UL (ref 3.4–10.8)

## 2021-01-19 ENCOUNTER — OFFICE VISIT (OUTPATIENT)
Dept: FAMILY MEDICINE CLINIC | Facility: CLINIC | Age: 71
End: 2021-01-19
Payer: MEDICARE

## 2021-01-19 VITALS
HEART RATE: 72 BPM | DIASTOLIC BLOOD PRESSURE: 68 MMHG | WEIGHT: 188 LBS | RESPIRATION RATE: 16 BRPM | SYSTOLIC BLOOD PRESSURE: 114 MMHG | HEIGHT: 70 IN | BODY MASS INDEX: 26.92 KG/M2 | TEMPERATURE: 97.5 F

## 2021-01-19 DIAGNOSIS — E78.2 MIXED HYPERLIPIDEMIA: ICD-10-CM

## 2021-01-19 DIAGNOSIS — E11.42 DIABETIC POLYNEUROPATHY ASSOCIATED WITH TYPE 2 DIABETES MELLITUS (HCC): Primary | ICD-10-CM

## 2021-01-19 DIAGNOSIS — I10 ESSENTIAL HYPERTENSION: ICD-10-CM

## 2021-01-19 DIAGNOSIS — I70.209 ATHEROSCLEROSIS OF ARTERIES OF EXTREMITIES (HCC): ICD-10-CM

## 2021-01-19 PROCEDURE — 99214 OFFICE O/P EST MOD 30 MIN: CPT | Performed by: FAMILY MEDICINE

## 2021-01-19 RX ORDER — METOPROLOL SUCCINATE 25 MG/1
12.5 TABLET, EXTENDED RELEASE ORAL DAILY
Qty: 45 TABLET | Refills: 0
Start: 2021-01-19 | End: 2021-11-02 | Stop reason: SDUPTHER

## 2021-01-19 NOTE — PROGRESS NOTES
Assessment/Plan:    1  Diabetic polyneuropathy associated with type 2 diabetes mellitus ([facility] )  Assessment & Plan:    Lab Results   Component Value Date    HGBA1C 6 5 (H) 01/14/2021     A1c is up to 6 5 with lowered dose of metformin  Orders:  -     Comprehensive metabolic panel; Future; Expected date: 04/04/2021  -     Hemoglobin A1C; Future; Expected date: 04/04/2021  -     Comprehensive metabolic panel  -     Hemoglobin A1C    2  Mixed hyperlipidemia  Assessment & Plan:  Well controlled with atorvastatin  Continue 40 mg daily      3  Essential hypertension  Assessment & Plan:  Well controlled  Continue metoprolol 12 5 mg daily and benazepril 5 mg daily    Orders:  -     metoprolol succinate (TOPROL-XL) 25 mg 24 hr tablet; Take 0 5 tablets (12 5 mg total) by mouth daily 12 5mg once a day    4  Atherosclerosis of arteries of extremities ([facility] )  Assessment & Plan:  Stable  Following with cardiology      BMI Counseling: Body mass index is 26 98 kg/m²  The BMI is above normal  Nutrition recommendations include moderation in carbohydrate intake  There are no Patient Instructions on file for this visit  Return in about 3 months (around 4/19/2021) for Next scheduled follow up  Subjective:      Patient ID: Tabitha Hammer is a 72 y o  male  Chief Complaint   Patient presents with    Follow-up     new labs--lj       He is doing well  He is still watching his diet  He does note that his neuropathy is worse when it is cold  He had his eye exam last week  Hypertension - patient has been taking his blood pressure medication regularly  Associated symptoms:  Swelling:  no  Leg cramps: no    Hyperlipidemia-  he  has been taking their cholesterol medication regularly    Associated symptoms:  Myalgias: no      The following portions of the patient's history were reviewed and updated as appropriate: allergies, current medications, past family history, past medical history, past social history, past surgical history and problem list     Review of Systems   Respiratory: Negative  Cardiovascular: Negative  Current Outpatient Medications   Medication Sig Dispense Refill    aspirin (ECOTRIN LOW STRENGTH) 81 mg EC tablet Take 1 tablet (81 mg total) by mouth daily 30 tablet 5    atorvastatin (LIPITOR) 40 mg tablet Take 1 tablet (40 mg total) by mouth daily 90 tablet 1    benazepril (LOTENSIN) 5 mg tablet Take 1 tablet (5 mg total) by mouth daily 90 tablet 1    clopidogrel (PLAVIX) 75 mg tablet TAKE ONE TABLET BY MOUTH EVERY DAY 90 tablet 1    gabapentin (NEURONTIN) 100 mg capsule Take 1 capsule (100 mg total) by mouth 2 (two) times a day 180 capsule 1    gabapentin (NEURONTIN) 400 mg capsule Take 1 capsule (400 mg total) by mouth 4 (four) times a day 360 capsule 0    glucose blood (Contour Next Test) test strip 1 each by Other route daily Test blood glucose 100 each 3    metFORMIN (GLUCOPHAGE) 500 mg tablet Take 1 tablet (500 mg total) by mouth daily with breakfast 90 tablet 1    metoprolol succinate (TOPROL-XL) 25 mg 24 hr tablet Take 0 5 tablets (12 5 mg total) by mouth daily 12 5mg once a day 45 tablet 0    Microlet Lancets MISC by Other route daily 100 each 3     No current facility-administered medications for this visit  Objective:    /68   Pulse 72   Temp 97 5 °F (36 4 °C)   Resp 16   Ht 5' 10" (1 778 m)   Wt 85 3 kg (188 lb)   BMI 26 98 kg/m²        Physical Exam  Vitals signs and nursing note reviewed  Constitutional:       Appearance: He is well-developed  HENT:      Head: Normocephalic and atraumatic  Right Ear: Tympanic membrane and external ear normal       Left Ear: Tympanic membrane and external ear normal    Cardiovascular:      Rate and Rhythm: Normal rate and regular rhythm  Heart sounds: Normal heart sounds  No murmur  Pulmonary:      Effort: Pulmonary effort is normal  No respiratory distress  Breath sounds: Normal breath sounds   No wheezing or rales  Musculoskeletal:      Right lower leg: No edema  Left lower leg: No edema                  Taylor Reus, DO

## 2021-01-19 NOTE — ASSESSMENT & PLAN NOTE
Lab Results   Component Value Date    HGBA1C 6 5 (H) 01/14/2021     A1c is up to 6 5 with lowered dose of metformin

## 2021-02-10 ENCOUNTER — IMMUNIZATIONS (OUTPATIENT)
Dept: FAMILY MEDICINE CLINIC | Facility: HOSPITAL | Age: 71
End: 2021-02-10

## 2021-02-10 DIAGNOSIS — Z23 ENCOUNTER FOR IMMUNIZATION: Primary | ICD-10-CM

## 2021-02-10 DIAGNOSIS — G60.9 IDIOPATHIC PERIPHERAL NEUROPATHY: ICD-10-CM

## 2021-02-10 PROCEDURE — 0011A SARS-COV-2 / COVID-19 MRNA VACCINE (MODERNA) 100 MCG: CPT

## 2021-02-10 PROCEDURE — 91301 SARS-COV-2 / COVID-19 MRNA VACCINE (MODERNA) 100 MCG: CPT

## 2021-02-10 RX ORDER — GABAPENTIN 400 MG/1
400 CAPSULE ORAL 4 TIMES DAILY
Qty: 360 CAPSULE | Refills: 1 | Status: SHIPPED | OUTPATIENT
Start: 2021-02-10 | End: 2021-08-09

## 2021-02-27 DIAGNOSIS — I21.9 MYOCARDIAL INFARCTION, UNSPECIFIED MI TYPE, UNSPECIFIED ARTERY (HCC): ICD-10-CM

## 2021-02-27 RX ORDER — CLOPIDOGREL BISULFATE 75 MG/1
TABLET ORAL
Qty: 90 TABLET | Refills: 1 | Status: SHIPPED | OUTPATIENT
Start: 2021-02-27 | End: 2021-08-30

## 2021-03-10 ENCOUNTER — IMMUNIZATIONS (OUTPATIENT)
Dept: FAMILY MEDICINE CLINIC | Facility: HOSPITAL | Age: 71
End: 2021-03-10

## 2021-03-10 DIAGNOSIS — Z23 ENCOUNTER FOR IMMUNIZATION: Primary | ICD-10-CM

## 2021-03-10 PROCEDURE — 0012A SARS-COV-2 / COVID-19 MRNA VACCINE (MODERNA) 100 MCG: CPT

## 2021-03-10 PROCEDURE — 91301 SARS-COV-2 / COVID-19 MRNA VACCINE (MODERNA) 100 MCG: CPT

## 2021-03-12 ENCOUNTER — TELEPHONE (OUTPATIENT)
Dept: GASTROENTEROLOGY | Facility: CLINIC | Age: 71
End: 2021-03-12

## 2021-03-12 ENCOUNTER — PREP FOR PROCEDURE (OUTPATIENT)
Dept: GASTROENTEROLOGY | Facility: CLINIC | Age: 71
End: 2021-03-12

## 2021-03-12 ENCOUNTER — TELEPHONE (OUTPATIENT)
Dept: FAMILY MEDICINE CLINIC | Facility: CLINIC | Age: 71
End: 2021-03-12

## 2021-03-12 DIAGNOSIS — Z86.010 HISTORY OF COLON POLYPS: Primary | ICD-10-CM

## 2021-03-12 NOTE — LETTER
Cardiology Pre Operative Clearance      PRE OPERATIVE CARDIAC RISK ASSESSMENT    03/15/21    Dia Olsen  1950  [id number]    Date of Surgery: 4/27/2021    Type of Surgery: Colonoscopy    Surgeon: [facility]    {SLA AMB CARDIAC CONTRAINDICATIONS:49397}    Anticoagulation: {RESPONSE; YES/NO/NA:19980}    Physician Comment: ***    Electronically Signed: ***

## 2021-03-12 NOTE — TELEPHONE ENCOUNTER
Sent clearance to Dr Mariya Juarez to get permission for patient to stop his clopidogrel 5 days prior to his 4/27 procedure  He is on for heart attack and 2 stents placed in 2012   Will call Dr Pedro Pardo if not received within 2 wks

## 2021-03-12 NOTE — TELEPHONE ENCOUNTER
Pt called to tell dr Wild Denis he scheduled his colonoscopy    [facility]

## 2021-04-06 ENCOUNTER — TELEPHONE (OUTPATIENT)
Dept: GASTROENTEROLOGY | Facility: CLINIC | Age: 71
End: 2021-04-06

## 2021-04-06 ENCOUNTER — OFFICE VISIT (OUTPATIENT)
Dept: CARDIOLOGY CLINIC | Facility: CLINIC | Age: 71
End: 2021-04-06
Payer: MEDICARE

## 2021-04-06 VITALS
HEIGHT: 70 IN | HEART RATE: 52 BPM | BODY MASS INDEX: 27.77 KG/M2 | DIASTOLIC BLOOD PRESSURE: 84 MMHG | WEIGHT: 194 LBS | SYSTOLIC BLOOD PRESSURE: 122 MMHG | TEMPERATURE: 97.5 F | OXYGEN SATURATION: 97 %

## 2021-04-06 DIAGNOSIS — E78.2 MIXED HYPERLIPIDEMIA: ICD-10-CM

## 2021-04-06 DIAGNOSIS — I10 ESSENTIAL HYPERTENSION: ICD-10-CM

## 2021-04-06 DIAGNOSIS — I70.209 ATHEROSCLEROSIS OF ARTERIES OF EXTREMITIES (HCC): ICD-10-CM

## 2021-04-06 DIAGNOSIS — I25.10 CORONARY ARTERY DISEASE INVOLVING NATIVE CORONARY ARTERY OF NATIVE HEART WITHOUT ANGINA PECTORIS: Primary | ICD-10-CM

## 2021-04-06 DIAGNOSIS — I25.2 HISTORY OF MYOCARDIAL INFARCTION IN ADULTHOOD: ICD-10-CM

## 2021-04-06 DIAGNOSIS — R00.1 SINUS BRADYCARDIA: ICD-10-CM

## 2021-04-06 PROCEDURE — 99214 OFFICE O/P EST MOD 30 MIN: CPT | Performed by: INTERNAL MEDICINE

## 2021-04-06 PROCEDURE — 93000 ELECTROCARDIOGRAM COMPLETE: CPT | Performed by: INTERNAL MEDICINE

## 2021-04-06 NOTE — PROGRESS NOTES
Cardiology Followup     Nae   [id number]  1950  [facility] PROFESSIONAL [facility]  [address] [address] [id number]    Consult for: CAD    Interval History: Nae  is a 72y o  year old male  who is here for followup of CAD and to discuss cardiac risk for colonoscopy  Since his last visit, he has been feeling well   he denies any palpitations, chest pain, shortness of breath, LE edema, orthopnea or PND  He has lost weight by adjusting diet  He is concerned about need for stopping plavix and ASA for upcoming colonoscopy  He is not sure if he stopped them for his last procedure 5 years ago  Denies any recent GI bleed  Past Cardiac History: In February 2012, he had a myocardial infarction where he felt a band like pain across the front of his chest   No associated dyspnea  He underwent PCI *2 to mid LAD with Xience 3 5mm * 12mm  and 4 0 mm * 12mm stents              Past Medical History:   Diagnosis Date    Acquired ankle/foot deformity     last assessed: 05/30/2017    Acute myocardial infarction [facility])     last assessed: 12/03/2013    Anemia     last assessed: 12/03/2013    Anxiety     last assessed: 12/03/2013    Cardiomyopathy ([facility] )     last assessed: 12/03/2013    Congestive heart failure (CHF) ([facility] )     onset: 02/29/2012    Coronary artery disease     Diabetes mellitus ([facility] )     Dysesthesia     last assessed: 09/10/2014    Exposure to hepatitis     last assessed: 11/15/2016    Foot slap     last assessed: 03/17/2016    Hypertension     Myocardial infarction [facility])     Palsy of left sciatic nerve     last assessed: 03/17/2016    Subconjunctival hemorrhage     last assessed: 03/03/2014    Xerosis cutis     last assessed: 10/25/2016     Social History     Socioeconomic History    Marital status:      Spouse name: Not on file    Number of children: Not on file    Years of education: Not on file    Highest education level: Not on file   Occupational History    Not on file   Social Needs    Financial resource strain: Not on file    Food insecurity     Worry: Not on file     Inability: Not on file    Transportation needs     Medical: Not on file     Non-medical: Not on file   Tobacco Use    Smoking status: Never Smoker    Smokeless tobacco: Never Used   Substance and Sexual Activity    Alcohol use: No    Drug use: No    Sexual activity: Not on file   Lifestyle    Physical activity     Days per week: Not on file     Minutes per session: Not on file    Stress: Not on file   Relationships    Social connections     Talks on phone: Not on file     Gets together: Not on file     Attends Holiness service: Not on file     Active member of club or organization: Not on file     Attends meetings of clubs or organizations: Not on file     Relationship status: Not on file    Intimate partner violence     Fear of current or ex partner: Not on file     Emotionally abused: Not on file     Physically abused: Not on file     Forced sexual activity: Not on file   Other Topics Concern    Not on file   Social History Narrative    Not on file      Family History   Problem Relation Age of Onset    Heart disease Mother         cardiac disorder    Leukemia Father     Ovarian cancer Sister      Past Surgical History:   Procedure Laterality Date    ANGIOPLASTY      2 coronary stents    CHOLECYSTECTOMY      COLONOSCOPY N/A 3/17/2016    Procedure: COLONOSCOPY;  Surgeon: Nancy Saunders MD;  Location: [place] [address] GI LAB;   Service:     HERNIA REPAIR      right inguinal        Current Outpatient Medications:     aspirin (ECOTRIN LOW STRENGTH) 81 mg EC tablet, Take 1 tablet (81 mg total) by mouth daily, Disp: 30 tablet, Rfl: 5    atorvastatin (LIPITOR) 40 mg tablet, Take 1 tablet (40 mg total) by mouth daily, Disp: 90 tablet, Rfl: 1    benazepril (LOTENSIN) 5 mg tablet, Take 1 tablet (5 mg total) by mouth daily, Disp: 90 tablet, Rfl: 1    clopidogrel (PLAVIX) 75 mg tablet, TAKE ONE TABLET BY MOUTH EVERY DAY, Disp: 90 tablet, Rfl: 1    gabapentin (NEURONTIN) 100 mg capsule, Take 1 capsule (100 mg total) by mouth 2 (two) times a day, Disp: 180 capsule, Rfl: 1    gabapentin (NEURONTIN) 400 mg capsule, Take 1 capsule (400 mg total) by mouth 4 (four) times a day, Disp: 360 capsule, Rfl: 1    glucose blood (Contour Next Test) test strip, 1 each by Other route daily Test blood glucose, Disp: 100 each, Rfl: 3    metFORMIN (GLUCOPHAGE) 500 mg tablet, Take 1 tablet (500 mg total) by mouth daily with breakfast, Disp: 90 tablet, Rfl: 1    metoprolol succinate (TOPROL-XL) 25 mg 24 hr tablet, Take 0 5 tablets (12 5 mg total) by mouth daily 12 5mg once a day, Disp: 45 tablet, Rfl: 0    Microlet Lancets MISC, by Other route daily, Disp: 100 each, Rfl: 3  No Known Allergies      Review of Systems:  Review of Systems   Constitutional: Negative for chills and fever  HENT: Negative for ear pain and sore throat  Eyes: Negative for pain and visual disturbance  Respiratory: Negative for cough and shortness of breath  Cardiovascular: Negative for chest pain and palpitations  Gastrointestinal: Negative for abdominal pain and vomiting  Genitourinary: Negative for dysuria and hematuria  Musculoskeletal: Negative for arthralgias and back pain  Skin: Negative for color change and rash  Neurological: Negative for seizures and syncope  All other systems reviewed and are negative  Physical Exam:  Vitals:    04/06/21 0915   BP: 122/84   BP Location: Left arm   Patient Position: Sitting   Cuff Size: Large   Pulse: (!) 52   Temp: 97 5 °F (36 4 °C)   TempSrc: Temporal   SpO2: 97%   Weight: 88 kg (194 lb)   Height: 5' 10" (1 778 m)     Physical Exam  Constitutional:       General: He is not in acute distress  Appearance: He is well-developed  He is not diaphoretic  HENT:      Head: Normocephalic and atraumatic     Eyes: Conjunctiva/sclera: Conjunctivae normal       Pupils: Pupils are equal, round, and reactive to light  Neck:      Musculoskeletal: Neck supple  Thyroid: No thyromegaly  Vascular: No JVD  Cardiovascular:      Rate and Rhythm: Regular rhythm  Bradycardia present  Heart sounds: Normal heart sounds  No murmur  No friction rub  No gallop  Pulmonary:      Effort: Pulmonary effort is normal       Breath sounds: Normal breath sounds  Skin:     General: Skin is warm and dry  Findings: No erythema or rash  Neurological:      Mental Status: He is alert and oriented to person, place, and time  Cranial Nerves: No cranial nerve deficit  Psychiatric:         Behavior: Behavior normal          Thought Content: Thought content normal          Judgment: Judgment normal          Labs:  Lab Results   Component Value Date     12/16/2017    K 4 6 01/14/2021     01/14/2021    CO2 25 01/14/2021    BUN 11 01/14/2021    CREATININE 0 83 01/14/2021    CREATININE 0 97 12/16/2017    GLUCOSE 93 12/16/2017    CALCIUM 9 7 12/16/2017     Lab Results   Component Value Date    WBC 7 4 01/14/2021    WBC 8 3 12/09/2016    HGB 16 3 01/14/2021    HGB 16 0 12/09/2016    HCT 49 0 01/14/2021    HCT 48 4 12/09/2016    MCV 87 01/14/2021    MCV 89 12/09/2016     01/14/2021     12/09/2016     Lab Results   Component Value Date    CHOL 153 12/16/2017    TRIG 62 01/14/2021    HDL 56 01/14/2021    LDLDIRECT 80 10/14/2014     Imaging: No results found  EKG (independently reviewed):  Sinus bradycardia at 55 beats per minute with nonspecific T-wave abnormalities    Discussion/Summary:  1  Coronary artery disease involving native coronary artery of native heart without angina pectoris  - Stress test was normal without any ischemia  Good functional capacity and normal BP response  2  Essential hypertension  - Continue benazepril   - No proteinuria on last urine study      3  Diabetic polyneuropathy associated with type 2 diabetes mellitus ([facility] )  - Consider switch of current DM medications to Jardiance (generic name empagliflozin) or Victoza (liraglutide) for cardiovascular benefit  - Recently increased metformin    4  Mixed hyperlipidemia  - Last LDL was 51 after switched to atorvastatin 40 mg daily  Given history of CAD, ideal LDL should be < 70 (or <40)  5  History of myocardial infarction in adulthood   - patient with bradycardia present on exam today which continued after metoprolol was reduced to 12 5 mg daily  May be able to discontinue if bradycardia persist   - reviewed antiplatelet regimen  He wishes to remain on Plavix in spite of stent being placed over 5 years ago  He is aware of bleeding risk  Will continue aspirin 81 mg as well  - Will check with Dr Guilford Hammans if he can remain on ASA for colonoscopy  He is aware that he needs to stop Clopidogrel for 5 days prior to procedure

## 2021-04-06 NOTE — TELEPHONE ENCOUNTER
Called and spoke to patient  Informed him he can stay on his aspirin, just to hold morning of procedure until he gets home and then he can take  Patient aware that he is to hold plavix 5 days prior

## 2021-04-06 NOTE — TELEPHONE ENCOUNTER
----- Message from Jonathan Ramirez DO sent at 4/6/2021  9:54 AM EDT -----  Good morning  Mr Rossana Barajas has a colonoscopy scheduled for end of month  He is anxious about stopping his ASA and Plavix  I saw him in office today and let him know that he needs to hold Plavix for 5 days and that I would check with you if he can remain on ASA 81 mg prior to procedure  Can you please let him or me know if that is okay?   Thank you,    NB

## 2021-04-06 NOTE — TELEPHONE ENCOUNTER
Dr Viri Morillo sent message stating patient can hold plavix 5 days but asked if I could call patient to let him know if he could continue his aspirin  Called and spoke with patient to let him know he doesn't have to stop aspirin, just hold morning of procedure and resume once he is home

## 2021-04-21 DIAGNOSIS — Z86.010 HISTORY OF COLON POLYPS: ICD-10-CM

## 2021-04-21 LAB
ALBUMIN SERPL-MCNC: 4.5 G/DL (ref 3.8–4.8)
ALBUMIN/GLOB SERPL: 2 {RATIO} (ref 1.2–2.2)
ALP SERPL-CCNC: 69 IU/L (ref 39–117)
ALT SERPL-CCNC: 21 IU/L (ref 0–44)
AST SERPL-CCNC: 18 IU/L (ref 0–40)
BILIRUB SERPL-MCNC: 0.5 MG/DL (ref 0–1.2)
BUN SERPL-MCNC: 16 MG/DL (ref 8–27)
BUN/CREAT SERPL: 17 (ref 10–24)
CALCIUM SERPL-MCNC: 9.6 MG/DL (ref 8.6–10.2)
CHLORIDE SERPL-SCNC: 100 MMOL/L (ref 96–106)
CO2 SERPL-SCNC: 29 MMOL/L (ref 20–29)
CREAT SERPL-MCNC: 0.93 MG/DL (ref 0.76–1.27)
EST. AVERAGE GLUCOSE BLD GHB EST-MCNC: 143 MG/DL
GLOBULIN SER-MCNC: 2.2 G/DL (ref 1.5–4.5)
GLUCOSE SERPL-MCNC: 94 MG/DL (ref 65–99)
HBA1C MFR BLD: 6.6 % (ref 4.8–5.6)
POTASSIUM SERPL-SCNC: 4.6 MMOL/L (ref 3.5–5.2)
PROT SERPL-MCNC: 6.7 G/DL (ref 6–8.5)
SL AMB EGFR AFRICAN AMERICAN: 96 ML/MIN/1.73
SL AMB EGFR NON AFRICAN AMERICAN: 83 ML/MIN/1.73
SODIUM SERPL-SCNC: 141 MMOL/L (ref 134–144)

## 2021-04-21 PROCEDURE — U0005 INFEC AGEN DETEC AMPLI PROBE: HCPCS | Performed by: INTERNAL MEDICINE

## 2021-04-21 PROCEDURE — U0003 INFECTIOUS AGENT DETECTION BY NUCLEIC ACID (DNA OR RNA); SEVERE ACUTE RESPIRATORY SYNDROME CORONAVIRUS 2 (SARS-COV-2) (CORONAVIRUS DISEASE [COVID-19]), AMPLIFIED PROBE TECHNIQUE, MAKING USE OF HIGH THROUGHPUT TECHNOLOGIES AS DESCRIBED BY CMS-2020-01-R: HCPCS | Performed by: INTERNAL MEDICINE

## 2021-04-21 NOTE — PRE-PROCEDURE INSTRUCTIONS
Pre-Surgery Instructions:   Medication Instructions    aspirin (ECOTRIN LOW STRENGTH) 81 mg EC tablet Patient was instructed by Physician and understands   atorvastatin (LIPITOR) 40 mg tablet Patient was instructed by Physician and understands   benazepril (LOTENSIN) 5 mg tablet Patient was instructed by Physician and understands   clopidogrel (PLAVIX) 75 mg tablet Patient was instructed by Physician and understands   gabapentin (NEURONTIN) 100 mg capsule Patient was instructed by Physician and understands   gabapentin (NEURONTIN) 400 mg capsule Patient was instructed by Physician and understands   glucose blood (Contour Next Test) test strip Patient was instructed by Physician and understands   metFORMIN (GLUCOPHAGE) 500 mg tablet Patient was instructed by Physician and understands   metoprolol succinate (TOPROL-XL) 25 mg 24 hr tablet Instructed patient per Anesthesia Guidelines   Microlet Lancets MISC Patient was instructed by Physician and understands  LD plavix 04/21/21 per MD instructions

## 2021-04-22 LAB — SARS-COV-2 RNA RESP QL NAA+PROBE: NEGATIVE

## 2021-04-26 ENCOUNTER — ANESTHESIA EVENT (OUTPATIENT)
Dept: GASTROENTEROLOGY | Facility: AMBULARY SURGERY CENTER | Age: 71
End: 2021-04-26

## 2021-04-26 PROBLEM — F41.9 ANXIETY: Status: ACTIVE | Noted: 2021-04-26

## 2021-04-26 PROBLEM — D64.9 ANEMIA: Status: ACTIVE | Noted: 2021-04-26

## 2021-04-26 PROBLEM — E11.9 DIABETES MELLITUS, TYPE 2 (HCC): Status: ACTIVE | Noted: 2021-04-26

## 2021-04-26 PROBLEM — Z98.61 HISTORY OF PTCA: Status: ACTIVE | Noted: 2021-04-26

## 2021-04-26 PROBLEM — I50.9 CHF (CONGESTIVE HEART FAILURE) (HCC): Status: ACTIVE | Noted: 2021-04-26

## 2021-04-27 ENCOUNTER — HOSPITAL ENCOUNTER (OUTPATIENT)
Dept: GASTROENTEROLOGY | Facility: AMBULARY SURGERY CENTER | Age: 71
Setting detail: OUTPATIENT SURGERY
Discharge: HOME | End: 2021-04-27
Attending: INTERNAL MEDICINE | Admitting: INTERNAL MEDICINE
Payer: MEDICARE

## 2021-04-27 ENCOUNTER — ANESTHESIA (OUTPATIENT)
Dept: GASTROENTEROLOGY | Facility: AMBULARY SURGERY CENTER | Age: 71
End: 2021-04-27

## 2021-04-27 VITALS
BODY MASS INDEX: 27.77 KG/M2 | RESPIRATION RATE: 20 BRPM | WEIGHT: 194 LBS | OXYGEN SATURATION: 99 % | SYSTOLIC BLOOD PRESSURE: 133 MMHG | TEMPERATURE: 98.1 F | HEART RATE: 55 BPM | HEIGHT: 70 IN | DIASTOLIC BLOOD PRESSURE: 76 MMHG

## 2021-04-27 DIAGNOSIS — Z86.010 HISTORY OF COLON POLYPS: ICD-10-CM

## 2021-04-27 PROBLEM — D64.9 ANEMIA: Status: RESOLVED | Noted: 2021-04-26 | Resolved: 2021-04-27

## 2021-04-27 PROCEDURE — 88305 TISSUE EXAM BY PATHOLOGIST: CPT | Performed by: PATHOLOGY

## 2021-04-27 PROCEDURE — 45385 COLONOSCOPY W/LESION REMOVAL: CPT | Performed by: INTERNAL MEDICINE

## 2021-04-27 PROCEDURE — 45380 COLONOSCOPY AND BIOPSY: CPT | Performed by: INTERNAL MEDICINE

## 2021-04-27 RX ORDER — PROPOFOL 10 MG/ML
INJECTION, EMULSION INTRAVENOUS AS NEEDED
Status: DISCONTINUED | OUTPATIENT
Start: 2021-04-27 | End: 2021-04-27

## 2021-04-27 RX ORDER — SODIUM CHLORIDE, SODIUM LACTATE, POTASSIUM CHLORIDE, CALCIUM CHLORIDE 600; 310; 30; 20 MG/100ML; MG/100ML; MG/100ML; MG/100ML
75 INJECTION, SOLUTION INTRAVENOUS CONTINUOUS
Status: DISCONTINUED | OUTPATIENT
Start: 2021-04-27 | End: 2021-05-01 | Stop reason: HOSPADM

## 2021-04-27 RX ADMIN — PROPOFOL 50 MG: 10 INJECTION, EMULSION INTRAVENOUS at 11:31

## 2021-04-27 RX ADMIN — PROPOFOL 50 MG: 10 INJECTION, EMULSION INTRAVENOUS at 11:37

## 2021-04-27 RX ADMIN — SODIUM CHLORIDE, SODIUM LACTATE, POTASSIUM CHLORIDE, AND CALCIUM CHLORIDE 75 ML/HR: .6; .31; .03; .02 INJECTION, SOLUTION INTRAVENOUS at 11:17

## 2021-04-27 RX ADMIN — PROPOFOL 100 MG: 10 INJECTION, EMULSION INTRAVENOUS at 11:26

## 2021-04-27 RX ADMIN — PROPOFOL 50 MG: 10 INJECTION, EMULSION INTRAVENOUS at 11:27

## 2021-04-27 RX ADMIN — SODIUM CHLORIDE, SODIUM LACTATE, POTASSIUM CHLORIDE, AND CALCIUM CHLORIDE: .6; .31; .03; .02 INJECTION, SOLUTION INTRAVENOUS at 10:53

## 2021-04-27 NOTE — H&P
History and Physical -  [facility]  Ac Cassidy 72 y o  male MRN: [id number]                  HPI: Ac Cassidy is a 72y o  year old male who presents for history of polyps      REVIEW OF SYSTEMS: Per the HPI, and otherwise unremarkable  Historical Information   Past Medical History:   Diagnosis Date    Acquired ankle/foot deformity     last assessed: 05/30/2017    Acute myocardial infarction [facility])     last assessed: 12/03/2013    Anemia     last assessed: 12/03/2013    Anxiety     last assessed: 12/03/2013    Cardiomyopathy ([facility] )     last assessed: 12/03/2013    Congestive heart failure (CHF) ([facility] )     onset: 02/29/2012    Coronary artery disease     Diabetes mellitus ([place] [address] )     Dysesthesia     last assessed: 09/10/2014    Exposure to hepatitis     last assessed: 11/15/2016    Foot slap     last assessed: 03/17/2016    Hypertension     Myocardial infarction [facility])     Palsy of left sciatic nerve     last assessed: 03/17/2016    Subconjunctival hemorrhage     last assessed: 03/03/2014    Xerosis cutis     last assessed: 10/25/2016     Past Surgical History:   Procedure Laterality Date    ANGIOPLASTY      2 coronary stents    CHOLECYSTECTOMY      COLONOSCOPY N/A 3/17/2016    Procedure: COLONOSCOPY;  Surgeon: Kennedy Treviño MD;  [facility];   Service:     HERNIA REPAIR      right inguinal      Social History   Social History     Substance and Sexual Activity   Alcohol Use No     Social History     Substance and Sexual Activity   Drug Use No     Social History     Tobacco Use   Smoking Status Never Smoker   Smokeless Tobacco Never Used     Family History   Problem Relation Age of Onset    Heart disease Mother         cardiac disorder    Leukemia Father     Ovarian cancer Sister        Meds/Allergies       Current Outpatient Medications:     aspirin (ECOTRIN LOW STRENGTH) 81 mg EC tablet    atorvastatin (LIPITOR) 40 mg tablet    benazepril (LOTENSIN) 5 mg tablet    clopidogrel (PLAVIX) 75 mg tablet    gabapentin (NEURONTIN) 100 mg capsule    gabapentin (NEURONTIN) 400 mg capsule    glucose blood (Contour Next Test) test strip    metFORMIN (GLUCOPHAGE) 500 mg tablet    metoprolol succinate (TOPROL-XL) 25 mg 24 hr tablet    Microlet Lancets MISC    Current Facility-Administered Medications:     lactated ringers infusion, 75 mL/hr, Intravenous, Continuous, 75 mL/hr at 04/27/21 1117    No Known Allergies    Objective     /94   Pulse 63   Temp 98 1 °F (36 7 °C) (Tympanic)   Resp 16   Ht 5' 10" (1 778 m)   Wt 88 kg (194 lb)   SpO2 100%   BMI 27 84 kg/m²       PHYSICAL EXAM    Gen: NAD  CV: RRR  CHEST: Clear  ABD: soft, NT/ND  EXT: no edema      ASSESSMENT/PLAN:  This is a 72y o  year old male here for colonoscopy, and he is stable and optimized for his procedure

## 2021-04-27 NOTE — ANESTHESIA PREPROCEDURE EVALUATION
Procedure:  COLONOSCOPY    Relevant Problems   CARDIO   (+) CHF (congestive heart failure) (HCC)   (+) Coronary artery disease involving native coronary artery of native heart without angina pectoris   (+) Essential hypertension   (+) History of PTCA with stents   (+) Mixed hyperlipidemia   (+) Myocardial infarction (HCC)      ENDO   (+) Diabetes mellitus, type 2 (HCC)      HEMATOLOGY   (+) Anemia (Resolved)      NEURO/PSYCH   (+) Anxiety   (+) Diabetic polyneuropathy associated with type 2 diabetes mellitus (HCC)   (+) Reflex sympathetic dystrophy        Physical Exam    Airway    Mallampati score: III  TM Distance: >3 FB  Neck ROM: full     Dental       Cardiovascular  Rhythm: regular, Rate: normal,     Pulmonary  Breath sounds clear to auscultation,     Other Findings        Anesthesia Plan  ASA Score- 3     Anesthesia Type- IV sedation with anesthesia with ASA Monitors  Additional Monitors:   Airway Plan:           Plan Factors-    Chart reviewed  Patient is not a current smoker  Induction- intravenous  Postoperative Plan-     Informed Consent- Anesthetic plan and risks discussed with patient  I personally reviewed this patient with the CRNA  Discussed and agreed on the Anesthesia Plan with the CRNA  Jaime Dominguez

## 2021-04-27 NOTE — ANESTHESIA POSTPROCEDURE EVALUATION
Post-Op Assessment Note    CV Status:  Stable  Pain Score: 0    Pain management: adequate     Mental Status:  Sleepy   Hydration Status:  Stable   PONV Controlled:  None   Airway Patency:  Patent   Two or more mitigation strategies used for obstructive sleep apnea   Post Op Vitals Reviewed: Yes      Staff: CRNA         No complications documented      BP   97/58   Temp 98   Pulse 65   Resp 16   SpO2 100

## 2021-04-28 ENCOUNTER — OFFICE VISIT (OUTPATIENT)
Dept: FAMILY MEDICINE CLINIC | Facility: CLINIC | Age: 71
End: 2021-04-28
Payer: MEDICARE

## 2021-04-28 VITALS
SYSTOLIC BLOOD PRESSURE: 120 MMHG | HEART RATE: 60 BPM | BODY MASS INDEX: 26.77 KG/M2 | WEIGHT: 187 LBS | DIASTOLIC BLOOD PRESSURE: 64 MMHG | TEMPERATURE: 98.4 F | RESPIRATION RATE: 16 BRPM | HEIGHT: 70 IN

## 2021-04-28 DIAGNOSIS — E78.2 MIXED HYPERLIPIDEMIA: ICD-10-CM

## 2021-04-28 DIAGNOSIS — E11.42 DIABETIC POLYNEUROPATHY ASSOCIATED WITH TYPE 2 DIABETES MELLITUS (HCC): Primary | ICD-10-CM

## 2021-04-28 DIAGNOSIS — I10 ESSENTIAL HYPERTENSION: ICD-10-CM

## 2021-04-28 PROBLEM — E11.9 DIABETES MELLITUS, TYPE 2 (HCC): Status: RESOLVED | Noted: 2021-04-26 | Resolved: 2021-04-28

## 2021-04-28 PROCEDURE — 99214 OFFICE O/P EST MOD 30 MIN: CPT | Performed by: FAMILY MEDICINE

## 2021-04-28 NOTE — ASSESSMENT & PLAN NOTE
Lab Results   Component Value Date    HGBA1C 6 6 (H) 04/21/2021       Diabetes is well controlled  Will continue metformin 500 mg daily

## 2021-04-28 NOTE — PROGRESS NOTES
Assessment/Plan:    1  Diabetic polyneuropathy associated with type 2 diabetes mellitus ([facility] )  Assessment & Plan:    Lab Results   Component Value Date    HGBA1C 6 6 (H) 04/21/2021       Diabetes is well controlled  Will continue metformin 500 mg daily    Orders:  -     Hemoglobin A1C; Future; Expected date: 07/12/2021  -     Microalbumin / creatinine urine ratio; Future; Expected date: 07/12/2021  -     Hemoglobin A1C  -     Microalbumin / creatinine urine ratio    2  Essential hypertension  Assessment & Plan:  Well control  Continue benazepril 5 mg daily and metoprolol 25 mg 1/2 daily    Orders:  -     CBC; Future; Expected date: 07/12/2021  -     Comprehensive metabolic panel; Future; Expected date: 07/12/2021  -     Lipid Panel with Direct LDL reflex; Future; Expected date: 07/12/2021  -     TSH, 3rd generation; Future; Expected date: 07/12/2021  -     CBC  -     Comprehensive metabolic panel  -     Lipid Panel with Direct LDL reflex  -     TSH, 3rd generation    3  Mixed hyperlipidemia  Assessment & Plan:  Stable  Continue atorvastatin 40 mg     Orders:  -     Comprehensive metabolic panel; Future; Expected date: 07/12/2021  -     Lipid Panel with Direct LDL reflex; Future; Expected date: 07/12/2021  -     Comprehensive metabolic panel  -     Lipid Panel with Direct LDL reflex          There are no Patient Instructions on file for this visit  Return in about 3 months (around 7/28/2021) for Annual Wellness Visit  Subjective:      Patient ID: Saeid Menchaca is a 72 y o  male  Chief Complaint   Patient presents with    Follow-up     medication ac/cma     Results    Diabetes    Blood Pressure Check       He is feeling well  He is watching his diet  Hypertension - patient has been taking his blood pressure medication regularly  Associated symptoms:  Swelling:  no  Leg cramps: no    Hyperlipidemia-  he  has been taking their cholesterol medication regularly    Associated symptoms:  Myalgias: no      The following portions of the patient's history were reviewed and updated as appropriate: allergies, current medications, past family history, past medical history, past social history, past surgical history and problem list     Review of Systems   Respiratory: Negative  Cardiovascular: Negative  Current Outpatient Medications   Medication Sig Dispense Refill    aspirin (ECOTRIN LOW STRENGTH) 81 mg EC tablet Take 1 tablet (81 mg total) by mouth daily 30 tablet 5    atorvastatin (LIPITOR) 40 mg tablet Take 1 tablet (40 mg total) by mouth daily 90 tablet 1    benazepril (LOTENSIN) 5 mg tablet Take 1 tablet (5 mg total) by mouth daily (Patient taking differently: Take 5 mg by mouth ) 90 tablet 1    clopidogrel (PLAVIX) 75 mg tablet TAKE ONE TABLET BY MOUTH EVERY DAY 90 tablet 1    gabapentin (NEURONTIN) 100 mg capsule Take 1 capsule (100 mg total) by mouth 2 (two) times a day 180 capsule 1    gabapentin (NEURONTIN) 400 mg capsule Take 1 capsule (400 mg total) by mouth 4 (four) times a day 360 capsule 1    glucose blood (Contour Next Test) test strip 1 each by Other route daily Test blood glucose 100 each 3    metFORMIN (GLUCOPHAGE) 500 mg tablet Take 1 tablet (500 mg total) by mouth daily with breakfast 90 tablet 1    metoprolol succinate (TOPROL-XL) 25 mg 24 hr tablet Take 0 5 tablets (12 5 mg total) by mouth daily 12 5mg once a day (Patient taking differently: Take 12 5 mg by mouth every evening 12 5mg once a day) 45 tablet 0    Microlet Lancets MISC by Other route daily 100 each 3     No current facility-administered medications for this visit        Facility-Administered Medications Ordered in Other Visits   Medication Dose Route Frequency Provider Last Rate Last Admin    lactated ringers infusion  75 mL/hr Intravenous Continuous Janny Shelton MD   Stopped at 04/27/21 1201       Objective:    /64   Pulse 60   Temp 98 4 °F (36 9 °C)   Resp 16   Ht 5' 10" (1 778 m) Wt 84 8 kg (187 lb)   BMI 26 83 kg/m²        Physical Exam  Vitals signs and nursing note reviewed  Constitutional:       Appearance: He is well-developed  HENT:      Head: Normocephalic and atraumatic  Right Ear: Tympanic membrane and external ear normal       Left Ear: Tympanic membrane and external ear normal    Cardiovascular:      Rate and Rhythm: Normal rate and regular rhythm  Heart sounds: Normal heart sounds  No murmur  Pulmonary:      Effort: Pulmonary effort is normal  No respiratory distress  Breath sounds: Normal breath sounds  No wheezing or rales  Musculoskeletal:      Right lower leg: No edema  Left lower leg: No edema                  Ayana Young DO

## 2021-05-16 DIAGNOSIS — I25.10 CORONARY ARTERY DISEASE INVOLVING NATIVE CORONARY ARTERY OF NATIVE HEART WITHOUT ANGINA PECTORIS: ICD-10-CM

## 2021-05-16 DIAGNOSIS — G60.9 IDIOPATHIC PERIPHERAL NEUROPATHY: ICD-10-CM

## 2021-05-16 DIAGNOSIS — E78.2 MIXED HYPERLIPIDEMIA: ICD-10-CM

## 2021-05-17 RX ORDER — ATORVASTATIN CALCIUM 40 MG/1
TABLET, FILM COATED ORAL
Qty: 90 TABLET | Refills: 1 | Status: SHIPPED | OUTPATIENT
Start: 2021-05-17 | End: 2021-11-02 | Stop reason: SDUPTHER

## 2021-05-17 RX ORDER — GABAPENTIN 100 MG/1
CAPSULE ORAL
Qty: 180 CAPSULE | Refills: 1 | Status: SHIPPED | OUTPATIENT
Start: 2021-05-17 | End: 2021-11-02 | Stop reason: SDUPTHER

## 2021-05-31 DIAGNOSIS — E11.42 DIABETIC POLYNEUROPATHY ASSOCIATED WITH TYPE 2 DIABETES MELLITUS (HCC): ICD-10-CM

## 2021-05-31 DIAGNOSIS — I10 BENIGN ESSENTIAL HYPERTENSION: ICD-10-CM

## 2021-06-01 RX ORDER — BENAZEPRIL HYDROCHLORIDE 5 MG/1
TABLET, FILM COATED ORAL
Qty: 90 TABLET | Refills: 1 | Status: SHIPPED | OUTPATIENT
Start: 2021-06-01 | End: 2021-11-02 | Stop reason: SDUPTHER

## 2021-07-30 LAB
ALBUMIN SERPL-MCNC: 4.6 G/DL (ref 3.8–4.8)
ALBUMIN/CREAT UR: 4 MG/G CREAT (ref 0–29)
ALBUMIN/GLOB SERPL: 2.2 {RATIO} (ref 1.2–2.2)
ALP SERPL-CCNC: 59 IU/L (ref 48–121)
ALT SERPL-CCNC: 22 IU/L (ref 0–44)
AST SERPL-CCNC: 15 IU/L (ref 0–40)
BILIRUB SERPL-MCNC: 0.5 MG/DL (ref 0–1.2)
BUN SERPL-MCNC: 15 MG/DL (ref 8–27)
BUN/CREAT SERPL: 18 (ref 10–24)
CALCIUM SERPL-MCNC: 9.5 MG/DL (ref 8.6–10.2)
CHLORIDE SERPL-SCNC: 101 MMOL/L (ref 96–106)
CHOLEST SERPL-MCNC: 129 MG/DL (ref 100–199)
CO2 SERPL-SCNC: 27 MMOL/L (ref 20–29)
CREAT SERPL-MCNC: 0.82 MG/DL (ref 0.76–1.27)
CREAT UR-MCNC: 137.5 MG/DL
ERYTHROCYTE [DISTWIDTH] IN BLOOD BY AUTOMATED COUNT: 13 % (ref 11.6–15.4)
EST. AVERAGE GLUCOSE BLD GHB EST-MCNC: 134 MG/DL
GLOBULIN SER-MCNC: 2.1 G/DL (ref 1.5–4.5)
GLUCOSE SERPL-MCNC: 103 MG/DL (ref 65–99)
HBA1C MFR BLD: 6.3 % (ref 4.8–5.6)
HCT VFR BLD AUTO: 48.1 % (ref 37.5–51)
HDLC SERPL-MCNC: 57 MG/DL
HGB BLD-MCNC: 15.3 G/DL (ref 13–17.7)
LDLC SERPL CALC-MCNC: 59 MG/DL (ref 0–99)
LDLC/HDLC SERPL: 1 RATIO (ref 0–3.6)
MCH RBC QN AUTO: 28.8 PG (ref 26.6–33)
MCHC RBC AUTO-ENTMCNC: 31.8 G/DL (ref 31.5–35.7)
MCV RBC AUTO: 90 FL (ref 79–97)
MICROALBUMIN UR-MCNC: 6 UG/ML
MICRODELETION SYND BLD/T FISH: NORMAL
PLATELET # BLD AUTO: 214 X10E3/UL (ref 150–450)
POTASSIUM SERPL-SCNC: 4.7 MMOL/L (ref 3.5–5.2)
PROT SERPL-MCNC: 6.7 G/DL (ref 6–8.5)
RBC # BLD AUTO: 5.32 X10E6/UL (ref 4.14–5.8)
SL AMB EGFR AFRICAN AMERICAN: 104 ML/MIN/1.73
SL AMB EGFR NON AFRICAN AMERICAN: 90 ML/MIN/1.73
SL AMB VLDL CHOLESTEROL CALC: 13 MG/DL (ref 5–40)
SODIUM SERPL-SCNC: 140 MMOL/L (ref 134–144)
TRIGL SERPL-MCNC: 58 MG/DL (ref 0–149)
TSH SERPL DL<=0.005 MIU/L-ACNC: 1.31 UIU/ML (ref 0.45–4.5)
WBC # BLD AUTO: 8.4 X10E3/UL (ref 3.4–10.8)

## 2021-08-03 ENCOUNTER — OFFICE VISIT (OUTPATIENT)
Dept: FAMILY MEDICINE CLINIC | Facility: CLINIC | Age: 71
End: 2021-08-03
Payer: MEDICARE

## 2021-08-03 VITALS
WEIGHT: 197.4 LBS | HEIGHT: 70 IN | DIASTOLIC BLOOD PRESSURE: 80 MMHG | HEART RATE: 63 BPM | TEMPERATURE: 96.7 F | BODY MASS INDEX: 28.26 KG/M2 | RESPIRATION RATE: 18 BRPM | SYSTOLIC BLOOD PRESSURE: 130 MMHG

## 2021-08-03 DIAGNOSIS — Z00.00 MEDICARE ANNUAL WELLNESS VISIT, SUBSEQUENT: Primary | ICD-10-CM

## 2021-08-03 DIAGNOSIS — I10 ESSENTIAL HYPERTENSION: ICD-10-CM

## 2021-08-03 DIAGNOSIS — E78.2 MIXED HYPERLIPIDEMIA: ICD-10-CM

## 2021-08-03 DIAGNOSIS — I50.9 CONGESTIVE HEART FAILURE, UNSPECIFIED HF CHRONICITY, UNSPECIFIED HEART FAILURE TYPE (HCC): ICD-10-CM

## 2021-08-03 DIAGNOSIS — E11.42 DIABETIC POLYNEUROPATHY ASSOCIATED WITH TYPE 2 DIABETES MELLITUS (HCC): ICD-10-CM

## 2021-08-03 PROCEDURE — 1123F ACP DISCUSS/DSCN MKR DOCD: CPT | Performed by: FAMILY MEDICINE

## 2021-08-03 PROCEDURE — G0439 PPPS, SUBSEQ VISIT: HCPCS | Performed by: FAMILY MEDICINE

## 2021-08-03 NOTE — PATIENT INSTRUCTIONS

## 2021-08-03 NOTE — ASSESSMENT & PLAN NOTE
Wt Readings from Last 3 Encounters:   08/03/21 89 5 kg (197 lb 6 4 oz)   04/28/21 84 8 kg (187 lb)   04/27/21 88 kg (194 lb)       Well controlled  Following with Dr Marisol Pedro

## 2021-08-03 NOTE — PROGRESS NOTES
Assessment and Plan:     Problem List Items Addressed This Visit     CHF (congestive heart failure) ([facility] [address] )     Wt Readings from Last 3 Encounters:   08/03/21 89 5 kg (197 lb 6 4 oz)   04/28/21 84 8 kg (187 lb)   04/27/21 88 kg (194 lb)       Well controlled  Following with Dr Sahra Bailey            Diabetic polyneuropathy associated with type 2 diabetes mellitus ([facility] )    Relevant Orders    Hemoglobin A1C    Essential hypertension     Well controlled   Continue benazepril 5 mg          Relevant Orders    CBC    Comprehensive metabolic panel    Lipid Panel with Direct LDL reflex    Mixed hyperlipidemia     Well controlled         Relevant Orders    Comprehensive metabolic panel    Lipid Panel with Direct LDL reflex      Other Visit Diagnoses     Medicare annual wellness visit, subsequent    -  Primary      Return in about 3 months (around 11/3/2021) for Next scheduled follow up  Preventive health issues were discussed with patient, and age appropriate screening tests were ordered as noted in patient's After Visit Summary  Personalized health advice and appropriate referrals for health education or preventive services given if needed, as noted in patient's After Visit Summary       History of Present Illness:     Patient presents for Medicare Annual Wellness visit    Patient Care Team:  Onesimo Linares DO as PCP - MD José Luis Montez OD Timoteo Mana, Adelia Wilkinson MD as Endoscopist     Problem List:     Patient Active Problem List   Diagnosis    Essential hypertension    Diabetic polyneuropathy associated with type 2 diabetes mellitus ([facility] )    Mixed hyperlipidemia    Impotence    Myocardial infarction ([facility] )    Nerve palsy    Onychomycosis    Peripheral neuropathy    Reflex sympathetic dystrophy    Tabes dorsalis    Atherosclerosis of arteries of extremities ([facility] )    Hyperkalemia    Coronary artery disease involving native coronary artery of native heart without angina pectoris    Anxiety    CHF (congestive heart failure) ([place] [address] )    History of PTCA with stents      Past Medical and Surgical History:     Past Medical History:   Diagnosis Date    Acquired ankle/foot deformity     last assessed: 05/30/2017    Acute myocardial infarction [facility])     last assessed: 12/03/2013    Anemia     last assessed: 12/03/2013    Anxiety     last assessed: 12/03/2013    Cardiomyopathy ([place] [address] )     last assessed: 12/03/2013    Congestive heart failure (CHF) ([place] [address] )     onset: 02/29/2012    Coronary artery disease     Diabetes mellitus ([place] [address] )     Dysesthesia     last assessed: 09/10/2014    Exposure to hepatitis     last assessed: 11/15/2016    Foot slap     last assessed: 03/17/2016    Hypertension     Myocardial infarction [facility])     Palsy of left sciatic nerve     last assessed: 03/17/2016    Subconjunctival hemorrhage     last assessed: 03/03/2014    Xerosis cutis     last assessed: 10/25/2016     Past Surgical History:   Procedure Laterality Date    ANGIOPLASTY      2 coronary stents    CHOLECYSTECTOMY      COLONOSCOPY N/A 3/17/2016    Procedure: COLONOSCOPY;  Surgeon: Roscoe Hickman MD;  Location: [place] [address] GI LAB;   Service:    [address]      right inguinal       Family History:     Family History   Problem Relation Age of Onset    Heart disease Mother         cardiac disorder    Leukemia Father     Ovarian cancer Sister       Social History:     Social History     Socioeconomic History    Marital status:      Spouse name: None    Number of children: None    Years of education: None    Highest education level: None   Occupational History    None   Tobacco Use    Smoking status: Never Smoker    Smokeless tobacco: Never Used   Vaping Use    Vaping Use: Never used   Substance and Sexual Activity    Alcohol use: No    Drug use: No    Sexual activity: None   Other Topics Concern    None   Social History Narrative    None     Social Determinants of Health     Financial Resource Strain:     Difficulty of Paying Living Expenses:    Food Insecurity:     Worried About Running Out of Food in the Last Year:     [address] in the Last Year:    Transportation Needs:     Lack of Transportation (Medical):      Lack of Transportation (Non-Medical):    Physical Activity:     Days of Exercise per Week:     Minutes of Exercise per Session:    Stress:     Feeling of Stress :    Social Connections:     Frequency of Communication with Friends and Family:     Frequency of Social Gatherings with Friends and Family:     Attends [employer] Services:     Active Member of Clubs or Organizations:     Attends Club or Organization Meetings:     Marital Status:    Intimate Partner Violence:     Fear of Current or Ex-Partner:     Emotionally Abused:     Physically Abused:     Sexually Abused:       Medications and Allergies:     Current Outpatient Medications   Medication Sig Dispense Refill    aspirin (ECOTRIN LOW STRENGTH) 81 mg EC tablet Take 1 tablet (81 mg total) by mouth daily 30 tablet 5    atorvastatin (LIPITOR) 40 mg tablet TAKE ONE TABLET BY MOUTH EVERY DAY 90 tablet 1    benazepril (LOTENSIN) 5 mg tablet TAKE ONE TABLET BY MOUTH EVERY DAY (GENERIC FOR LOTENSIN) 90 tablet 1    clopidogrel (PLAVIX) 75 mg tablet TAKE ONE TABLET BY MOUTH EVERY DAY 90 tablet 1    gabapentin (NEURONTIN) 100 mg capsule TAKE ONE CAPSULE BY MOUTH TWICE A  capsule 1    gabapentin (NEURONTIN) 400 mg capsule Take 1 capsule (400 mg total) by mouth 4 (four) times a day 360 capsule 1    glucose blood (Contour Next Test) test strip 1 each by Other route daily Test blood glucose 100 each 3    metFORMIN (GLUCOPHAGE) 500 mg tablet Take 1 tablet (500 mg total) by mouth daily with breakfast 90 tablet 1    metoprolol succinate (TOPROL-XL) 25 mg 24 hr tablet Take 0 5 tablets (12 5 mg total) by mouth daily 12 5mg once a day (Patient taking differently: Take 12 5 mg by mouth every evening 12 5mg once a day) 45 tablet 0    Microlet Lancets MISC by Other route daily 100 each 3     No current facility-administered medications for this visit  No Known Allergies   Immunizations:     Immunization History   Administered Date(s) Administered    INFLUENZA 09/01/2018, 09/26/2019    Influenza Quadrivalent Preservative Free 3 years and older IM 10/21/2014, 10/15/2015    Influenza Split High Dose Preservative Free IM 09/08/2016    Influenza, high dose seasonal 0 7 mL 09/01/2020    Influenza, seasonal, injectable 08/25/2017    Pneumococcal Conjugate 13-Valent 11/17/2015    Pneumococcal Polysaccharide PPV23 10/21/2014, 09/27/2017    SARS-CoV-2 / COVID-19 mRNA IM (Moderna) 02/10/2021, 03/10/2021    Tdap 10/07/2016    Unknown 02/20/2018, 05/15/2018    Zoster 10/21/2014, 03/20/2018    Zoster Vaccine Recombinant 05/15/2018      Health Maintenance:         Topic Date Due    Colorectal Cancer Screening  04/27/2026    Hepatitis C Screening  Completed         Topic Date Due    Influenza Vaccine (1) 09/01/2021      Medicare Health Risk Assessment:     /80   Pulse 63   Temp (!) 96 7 °F (35 9 °C)   Resp 18   Ht 5' 10" (1 778 m)   Wt 89 5 kg (197 lb 6 4 oz)   BMI 28 32 kg/m²      Clint Melissa is here for his Subsequent Wellness visit  Health Risk Assessment:   Patient rates overall health as very good  Patient feels that their physical health rating is same  Patient is satisfied with their life  Eyesight was rated as same  Hearing was rated as same  Patient feels that their emotional and mental health rating is same  Patients states they are sometimes angry  Patient states they are sometimes unusually tired/fatigued  Pain experienced in the last 7 days has been none  Patient states that he has experienced no weight loss or gain in last 6 months  Depression Screening:   PHQ-2 Score: 0      Fall Risk Screening:    In the past year, patient has experienced: no history of falling in past year      Home Safety:  Patient does not have trouble with stairs inside or outside of their home  Patient has working smoke alarms and has working carbon monoxide detector  Home safety hazards include: none  Nutrition:   Current diet is Low Carb  Medications:   Patient is not currently taking any over-the-counter supplements  Patient is able to manage medications  Activities of Daily Living (ADLs)/Instrumental Activities of Daily Living (IADLs):   Walk and transfer into and out of bed and chair?: Yes  Dress and groom yourself?: Yes    Bathe or shower yourself?: Yes    Feed yourself? Yes  Do your laundry/housekeeping?: Yes  Manage your money, pay your bills and track your expenses?: Yes  Make your own meals?: Yes    Do your own shopping?: Yes    Previous Hospitalizations:   Any hospitalizations or ED visits within the last 12 months?: No      Advance Care Planning:   Living will: No    Durable POA for healthcare:  Yes    Advanced directive counseling given: Yes    End of Life Decisions reviewed with patient: Yes    Provider agrees with end of life decisions: Yes      Cognitive Screening:   Provider or family/friend/caregiver concerned regarding cognition?: No    PREVENTIVE SCREENINGS      Cardiovascular Screening:    General: Screening Not Indicated and History Lipid Disorder      Diabetes Screening:     General: Screening Not Indicated and History Diabetes      Colorectal Cancer Screening:     General: Screening Current      Prostate Cancer Screening:    General: Screening Current      Osteoporosis Screening:    General: Screening Not Indicated      Abdominal Aortic Aneurysm (AAA) Screening:    Risk factors include: age between 73-67 yo        Lung Cancer Screening:     General: Screening Not Indicated      Hepatitis C Screening:    General: Screening Current    Screening, Brief Intervention, and Referral to Treatment (SBIRT)    Screening  Typical number of drinks in a day: 0  Typical number of drinks in a week: 0  Interpretation: Low risk drinking behavior  AUDIT-C Screenin) How often did you have a drink containing alcohol in the past year? never  2) How many drinks did you have on a typical day when you were drinking in the past year? 0  3) How often did you have 6 or more drinks on one occasion in the past year? never    AUDIT-C Score: 0  Interpretation: Score 0-3 (male): Negative screen for alcohol misuse    Single Item Drug Screening:  How often have you used an illegal drug (including marijuana) or a prescription medication for non-medical reasons in the past year? never    Single Item Drug Screen Score: 0  Interpretation: Negative screen for possible drug use disorder    Brief Intervention  Alcohol & drug use screenings were reviewed  No concerns regarding substance use disorder identified  Physical Exam  Vitals reviewed  Constitutional:       Appearance: He is well-developed  HENT:      Head: Normocephalic and atraumatic  Right Ear: External ear normal       Left Ear: External ear normal    Cardiovascular:      Rate and Rhythm: Normal rate and regular rhythm  Pulses:           Dorsalis pedis pulses are 1+ on the right side and 1+ on the left side  Posterior tibial pulses are 1+ on the right side and 1+ on the left side  Heart sounds: Normal heart sounds  No murmur heard  Pulmonary:      Effort: Pulmonary effort is normal  No respiratory distress  Breath sounds: Normal breath sounds  No wheezing  Abdominal:      Palpations: Abdomen is soft  Tenderness: There is no abdominal tenderness  Musculoskeletal:         General: Normal range of motion  Feet:      Right foot:      Skin integrity: No ulcer, skin breakdown, erythema, warmth, callus or dry skin  Left foot:      Skin integrity: No ulcer, skin breakdown, erythema, warmth, callus or dry skin  Skin:     General: Skin is warm and dry     Neurological:      Mental Status: He is alert and oriented to person, place, and time  Patient's shoes and socks removed  Right Foot/Ankle   Right Foot Inspection  Skin Exam: skin normal skin not intact, no dry skin, no warmth, no callus, no erythema, no maceration, no abnormal color, no pre-ulcer, no ulcer and no callus                          Toe Exam: ROM and strength within normal limits  Sensory       Monofilament testing: intact  Vascular  Capillary refills: < 3 seconds  The right DP pulse is 1+  The right PT pulse is 1+  Left Foot/Ankle  Left Foot Inspection  Skin Exam: skin normalskin not intact, no dry skin, no warmth, no erythema, no maceration, normal color, no pre-ulcer, no ulcer and no callus                         Toe Exam: ROM and strength within normal limits                   Sensory       Monofilament: intact  Vascular  Capillary refills: < 3 seconds  The left DP pulse is 1+  The left PT pulse is 1+     Assign Risk Category:  No deformity present; ;        Risk: 0     William Haro,

## 2021-08-09 DIAGNOSIS — G60.9 IDIOPATHIC PERIPHERAL NEUROPATHY: ICD-10-CM

## 2021-08-09 RX ORDER — GABAPENTIN 400 MG/1
CAPSULE ORAL
Qty: 360 CAPSULE | Refills: 1 | Status: SHIPPED | OUTPATIENT
Start: 2021-08-09 | End: 2021-11-02 | Stop reason: SDUPTHER

## 2021-08-28 DIAGNOSIS — I21.9 MYOCARDIAL INFARCTION, UNSPECIFIED MI TYPE, UNSPECIFIED ARTERY (HCC): ICD-10-CM

## 2021-08-30 RX ORDER — CLOPIDOGREL BISULFATE 75 MG/1
TABLET ORAL
Qty: 90 TABLET | Refills: 1 | Status: SHIPPED | OUTPATIENT
Start: 2021-08-30 | End: 2021-11-02 | Stop reason: SDUPTHER

## 2021-09-14 DIAGNOSIS — E11.42 DIABETIC POLYNEUROPATHY ASSOCIATED WITH TYPE 2 DIABETES MELLITUS (HCC): ICD-10-CM

## 2021-10-05 ENCOUNTER — OFFICE VISIT (OUTPATIENT)
Dept: CARDIOLOGY CLINIC | Facility: CLINIC | Age: 71
End: 2021-10-05
Payer: MEDICARE

## 2021-10-05 VITALS
WEIGHT: 198 LBS | HEART RATE: 60 BPM | OXYGEN SATURATION: 96 % | SYSTOLIC BLOOD PRESSURE: 142 MMHG | HEIGHT: 70 IN | TEMPERATURE: 98.3 F | DIASTOLIC BLOOD PRESSURE: 70 MMHG | BODY MASS INDEX: 28.35 KG/M2

## 2021-10-05 DIAGNOSIS — I21.9 MYOCARDIAL INFARCTION, UNSPECIFIED MI TYPE, UNSPECIFIED ARTERY (HCC): ICD-10-CM

## 2021-10-05 DIAGNOSIS — E78.2 MIXED HYPERLIPIDEMIA: ICD-10-CM

## 2021-10-05 DIAGNOSIS — I10 ESSENTIAL HYPERTENSION: ICD-10-CM

## 2021-10-05 DIAGNOSIS — I70.209 ATHEROSCLEROSIS OF ARTERIES OF EXTREMITIES (HCC): ICD-10-CM

## 2021-10-05 DIAGNOSIS — I25.10 CORONARY ARTERY DISEASE INVOLVING NATIVE CORONARY ARTERY OF NATIVE HEART WITHOUT ANGINA PECTORIS: Primary | ICD-10-CM

## 2021-10-05 PROCEDURE — 93000 ELECTROCARDIOGRAM COMPLETE: CPT | Performed by: INTERNAL MEDICINE

## 2021-10-05 PROCEDURE — 99214 OFFICE O/P EST MOD 30 MIN: CPT | Performed by: INTERNAL MEDICINE

## 2021-10-26 ENCOUNTER — RA CDI HCC (OUTPATIENT)
Dept: OTHER | Facility: HOSPITAL | Age: 71
End: 2021-10-26

## 2021-10-26 LAB
ALBUMIN SERPL-MCNC: 4.6 G/DL (ref 3.8–4.8)
ALBUMIN/GLOB SERPL: 1.9 {RATIO} (ref 1.2–2.2)
ALP SERPL-CCNC: 68 IU/L (ref 44–121)
ALT SERPL-CCNC: 17 IU/L (ref 0–44)
AST SERPL-CCNC: 13 IU/L (ref 0–40)
BILIRUB SERPL-MCNC: 0.6 MG/DL (ref 0–1.2)
BUN SERPL-MCNC: 14 MG/DL (ref 8–27)
BUN/CREAT SERPL: 14 (ref 10–24)
CALCIUM SERPL-MCNC: 9.8 MG/DL (ref 8.6–10.2)
CHLORIDE SERPL-SCNC: 102 MMOL/L (ref 96–106)
CHOLEST SERPL-MCNC: 147 MG/DL (ref 100–199)
CO2 SERPL-SCNC: 26 MMOL/L (ref 20–29)
CREAT SERPL-MCNC: 0.98 MG/DL (ref 0.76–1.27)
ERYTHROCYTE [DISTWIDTH] IN BLOOD BY AUTOMATED COUNT: 12.7 % (ref 11.6–15.4)
EST. AVERAGE GLUCOSE BLD GHB EST-MCNC: 143 MG/DL
GLOBULIN SER-MCNC: 2.4 G/DL (ref 1.5–4.5)
GLUCOSE SERPL-MCNC: 110 MG/DL (ref 65–99)
HBA1C MFR BLD: 6.6 % (ref 4.8–5.6)
HCT VFR BLD AUTO: 47.3 % (ref 37.5–51)
HDLC SERPL-MCNC: 51 MG/DL
HGB BLD-MCNC: 16.3 G/DL (ref 13–17.7)
LDLC SERPL CALC-MCNC: 79 MG/DL (ref 0–99)
LDLC/HDLC SERPL: 1.5 RATIO (ref 0–3.6)
MCH RBC QN AUTO: 29.5 PG (ref 26.6–33)
MCHC RBC AUTO-ENTMCNC: 34.5 G/DL (ref 31.5–35.7)
MCV RBC AUTO: 86 FL (ref 79–97)
MICRODELETION SYND BLD/T FISH: NORMAL
PLATELET # BLD AUTO: 241 X10E3/UL (ref 150–450)
POTASSIUM SERPL-SCNC: 5.5 MMOL/L (ref 3.5–5.2)
PROT SERPL-MCNC: 7 G/DL (ref 6–8.5)
RBC # BLD AUTO: 5.53 X10E6/UL (ref 4.14–5.8)
SL AMB EGFR AFRICAN AMERICAN: 90 ML/MIN/1.73
SL AMB EGFR NON AFRICAN AMERICAN: 78 ML/MIN/1.73
SL AMB VLDL CHOLESTEROL CALC: 17 MG/DL (ref 5–40)
SODIUM SERPL-SCNC: 141 MMOL/L (ref 134–144)
TRIGL SERPL-MCNC: 88 MG/DL (ref 0–149)
WBC # BLD AUTO: 10.2 X10E3/UL (ref 3.4–10.8)

## 2021-10-27 PROBLEM — I11.0 HYPERTENSIVE HEART DISEASE WITH HEART FAILURE (HCC): Status: ACTIVE | Noted: 2021-10-27

## 2021-10-27 PROBLEM — E11.36 TYPE 2 DIABETES MELLITUS WITH DIABETIC CATARACT (HCC): Status: ACTIVE | Noted: 2021-10-27

## 2021-11-02 ENCOUNTER — OFFICE VISIT (OUTPATIENT)
Dept: FAMILY MEDICINE CLINIC | Facility: CLINIC | Age: 71
End: 2021-11-02
Payer: MEDICARE

## 2021-11-02 VITALS
HEART RATE: 62 BPM | DIASTOLIC BLOOD PRESSURE: 72 MMHG | SYSTOLIC BLOOD PRESSURE: 128 MMHG | WEIGHT: 199 LBS | TEMPERATURE: 96.8 F | RESPIRATION RATE: 18 BRPM | BODY MASS INDEX: 28.49 KG/M2 | HEIGHT: 70 IN

## 2021-11-02 DIAGNOSIS — E11.36 TYPE 2 DIABETES MELLITUS WITH DIABETIC CATARACT, WITHOUT LONG-TERM CURRENT USE OF INSULIN (HCC): Primary | ICD-10-CM

## 2021-11-02 DIAGNOSIS — I21.9 MYOCARDIAL INFARCTION, UNSPECIFIED MI TYPE, UNSPECIFIED ARTERY (HCC): ICD-10-CM

## 2021-11-02 DIAGNOSIS — I10 ESSENTIAL HYPERTENSION: ICD-10-CM

## 2021-11-02 DIAGNOSIS — I25.10 CORONARY ARTERY DISEASE INVOLVING NATIVE CORONARY ARTERY OF NATIVE HEART WITHOUT ANGINA PECTORIS: ICD-10-CM

## 2021-11-02 DIAGNOSIS — I11.0 HYPERTENSIVE HEART DISEASE WITH HEART FAILURE (HCC): ICD-10-CM

## 2021-11-02 DIAGNOSIS — Z12.5 PROSTATE CANCER SCREENING: ICD-10-CM

## 2021-11-02 DIAGNOSIS — E11.42 DIABETIC POLYNEUROPATHY ASSOCIATED WITH TYPE 2 DIABETES MELLITUS (HCC): ICD-10-CM

## 2021-11-02 DIAGNOSIS — E87.5 HYPERKALEMIA: ICD-10-CM

## 2021-11-02 DIAGNOSIS — G60.9 IDIOPATHIC PERIPHERAL NEUROPATHY: ICD-10-CM

## 2021-11-02 DIAGNOSIS — E78.2 MIXED HYPERLIPIDEMIA: ICD-10-CM

## 2021-11-02 DIAGNOSIS — I10 BENIGN ESSENTIAL HYPERTENSION: ICD-10-CM

## 2021-11-02 PROCEDURE — 99214 OFFICE O/P EST MOD 30 MIN: CPT | Performed by: FAMILY MEDICINE

## 2021-11-02 RX ORDER — ATORVASTATIN CALCIUM 40 MG/1
40 TABLET, FILM COATED ORAL DAILY
Qty: 90 TABLET | Refills: 1 | Status: SHIPPED | OUTPATIENT
Start: 2021-11-02 | End: 2022-02-08 | Stop reason: SDUPTHER

## 2021-11-02 RX ORDER — BENAZEPRIL HYDROCHLORIDE 5 MG/1
5 TABLET, FILM COATED ORAL DAILY
Qty: 90 TABLET | Refills: 1 | Status: SHIPPED | OUTPATIENT
Start: 2021-11-02 | End: 2021-11-19 | Stop reason: SINTOL

## 2021-11-02 RX ORDER — GABAPENTIN 100 MG/1
100 CAPSULE ORAL 2 TIMES DAILY
Qty: 180 CAPSULE | Refills: 1 | Status: SHIPPED | OUTPATIENT
Start: 2021-11-02 | End: 2022-05-13

## 2021-11-02 RX ORDER — BLOOD SUGAR DIAGNOSTIC
1 STRIP MISCELLANEOUS DAILY
Qty: 100 EACH | Refills: 3 | Status: SHIPPED | OUTPATIENT
Start: 2021-11-02

## 2021-11-02 RX ORDER — GABAPENTIN 400 MG/1
400 CAPSULE ORAL 4 TIMES DAILY
Qty: 360 CAPSULE | Refills: 1 | Status: SHIPPED | OUTPATIENT
Start: 2021-11-02 | End: 2022-02-08 | Stop reason: SDUPTHER

## 2021-11-02 RX ORDER — METOPROLOL SUCCINATE 25 MG/1
12.5 TABLET, EXTENDED RELEASE ORAL DAILY
Qty: 45 TABLET | Refills: 0 | Status: SHIPPED | OUTPATIENT
Start: 2021-11-02 | End: 2021-11-13

## 2021-11-02 RX ORDER — CLOPIDOGREL BISULFATE 75 MG/1
75 TABLET ORAL DAILY
Qty: 90 TABLET | Refills: 1 | Status: SHIPPED | OUTPATIENT
Start: 2021-11-02 | End: 2022-02-08 | Stop reason: SDUPTHER

## 2021-11-03 LAB
BUN SERPL-MCNC: 13 MG/DL (ref 8–27)
BUN/CREAT SERPL: 15 (ref 10–24)
CALCIUM SERPL-MCNC: 9.8 MG/DL (ref 8.6–10.2)
CHLORIDE SERPL-SCNC: 103 MMOL/L (ref 96–106)
CO2 SERPL-SCNC: 26 MMOL/L (ref 20–29)
CREAT SERPL-MCNC: 0.86 MG/DL (ref 0.76–1.27)
GLUCOSE SERPL-MCNC: 99 MG/DL (ref 65–99)
POTASSIUM SERPL-SCNC: 5.6 MMOL/L (ref 3.5–5.2)
SL AMB EGFR AFRICAN AMERICAN: 102 ML/MIN/1.73
SL AMB EGFR NON AFRICAN AMERICAN: 88 ML/MIN/1.73
SODIUM SERPL-SCNC: 141 MMOL/L (ref 134–144)

## 2021-11-04 ENCOUNTER — TELEPHONE (OUTPATIENT)
Dept: FAMILY MEDICINE CLINIC | Facility: CLINIC | Age: 71
End: 2021-11-04

## 2021-11-04 DIAGNOSIS — E87.5 HYPERKALEMIA: Primary | ICD-10-CM

## 2021-11-05 ENCOUNTER — IMMUNIZATIONS (OUTPATIENT)
Dept: FAMILY MEDICINE CLINIC | Facility: HOSPITAL | Age: 71
End: 2021-11-05

## 2021-11-05 DIAGNOSIS — Z23 ENCOUNTER FOR IMMUNIZATION: Primary | ICD-10-CM

## 2021-11-05 PROCEDURE — 0013A COVID-19 MODERNA VACC 0.25 ML BOOSTER: CPT

## 2021-11-05 PROCEDURE — 91306 COVID-19 MODERNA VACC 0.25 ML BOOSTER: CPT

## 2021-11-13 DIAGNOSIS — I10 ESSENTIAL HYPERTENSION: ICD-10-CM

## 2021-11-13 RX ORDER — METOPROLOL SUCCINATE 25 MG/1
TABLET, EXTENDED RELEASE ORAL
Qty: 90 TABLET | Refills: 1 | Status: SHIPPED | OUTPATIENT
Start: 2021-11-13 | End: 2022-02-08 | Stop reason: SDUPTHER

## 2021-11-18 LAB
BUN SERPL-MCNC: 12 MG/DL (ref 8–27)
BUN/CREAT SERPL: 13 (ref 10–24)
CALCIUM SERPL-MCNC: 9.7 MG/DL (ref 8.6–10.2)
CHLORIDE SERPL-SCNC: 103 MMOL/L (ref 96–106)
CO2 SERPL-SCNC: 28 MMOL/L (ref 20–29)
CREAT SERPL-MCNC: 0.89 MG/DL (ref 0.76–1.27)
GLUCOSE SERPL-MCNC: 110 MG/DL (ref 65–99)
POTASSIUM SERPL-SCNC: 5.4 MMOL/L (ref 3.5–5.2)
SL AMB EGFR AFRICAN AMERICAN: 100 ML/MIN/1.73
SL AMB EGFR NON AFRICAN AMERICAN: 87 ML/MIN/1.73
SODIUM SERPL-SCNC: 143 MMOL/L (ref 134–144)

## 2021-11-19 ENCOUNTER — TELEPHONE (OUTPATIENT)
Dept: FAMILY MEDICINE CLINIC | Facility: CLINIC | Age: 71
End: 2021-11-19

## 2021-11-19 DIAGNOSIS — E87.5 HYPERKALEMIA: Primary | ICD-10-CM

## 2021-12-03 LAB
BUN SERPL-MCNC: 14 MG/DL (ref 8–27)
BUN/CREAT SERPL: 15 (ref 10–24)
CALCIUM SERPL-MCNC: 9.7 MG/DL (ref 8.6–10.2)
CHLORIDE SERPL-SCNC: 101 MMOL/L (ref 96–106)
CO2 SERPL-SCNC: 27 MMOL/L (ref 20–29)
CREAT SERPL-MCNC: 0.93 MG/DL (ref 0.76–1.27)
GLUCOSE SERPL-MCNC: 106 MG/DL (ref 65–99)
POTASSIUM SERPL-SCNC: 5.2 MMOL/L (ref 3.5–5.2)
SL AMB EGFR AFRICAN AMERICAN: 96 ML/MIN/1.73
SL AMB EGFR NON AFRICAN AMERICAN: 83 ML/MIN/1.73
SODIUM SERPL-SCNC: 140 MMOL/L (ref 134–144)

## 2022-02-01 LAB
ALBUMIN SERPL-MCNC: 4.6 G/DL (ref 3.8–4.8)
ALBUMIN/GLOB SERPL: 2.1 {RATIO} (ref 1.2–2.2)
ALP SERPL-CCNC: 67 IU/L (ref 44–121)
ALT SERPL-CCNC: 25 IU/L (ref 0–44)
AST SERPL-CCNC: 19 IU/L (ref 0–40)
BILIRUB SERPL-MCNC: 0.6 MG/DL (ref 0–1.2)
BUN SERPL-MCNC: 15 MG/DL (ref 8–27)
BUN/CREAT SERPL: 18 (ref 10–24)
CALCIUM SERPL-MCNC: 9.5 MG/DL (ref 8.6–10.2)
CHLORIDE SERPL-SCNC: 100 MMOL/L (ref 96–106)
CHOLEST SERPL-MCNC: 144 MG/DL (ref 100–199)
CO2 SERPL-SCNC: 27 MMOL/L (ref 20–29)
CREAT SERPL-MCNC: 0.84 MG/DL (ref 0.76–1.27)
ERYTHROCYTE [DISTWIDTH] IN BLOOD BY AUTOMATED COUNT: 12.4 % (ref 11.6–15.4)
EST. AVERAGE GLUCOSE BLD GHB EST-MCNC: 140 MG/DL
GLOBULIN SER-MCNC: 2.2 G/DL (ref 1.5–4.5)
GLUCOSE SERPL-MCNC: 109 MG/DL (ref 65–99)
HBA1C MFR BLD: 6.5 % (ref 4.8–5.6)
HCT VFR BLD AUTO: 47.7 % (ref 37.5–51)
HDLC SERPL-MCNC: 58 MG/DL
HGB BLD-MCNC: 15.7 G/DL (ref 13–17.7)
LDLC SERPL CALC-MCNC: 73 MG/DL (ref 0–99)
LDLC/HDLC SERPL: 1.3 RATIO (ref 0–3.6)
MCH RBC QN AUTO: 28.6 PG (ref 26.6–33)
MCHC RBC AUTO-ENTMCNC: 32.9 G/DL (ref 31.5–35.7)
MCV RBC AUTO: 87 FL (ref 79–97)
MICRODELETION SYND BLD/T FISH: NORMAL
PLATELET # BLD AUTO: 233 X10E3/UL (ref 150–450)
POTASSIUM SERPL-SCNC: 5.1 MMOL/L (ref 3.5–5.2)
PROT SERPL-MCNC: 6.8 G/DL (ref 6–8.5)
PSA SERPL-MCNC: 1.5 NG/ML (ref 0–4)
RBC # BLD AUTO: 5.48 X10E6/UL (ref 4.14–5.8)
SL AMB EGFR AFRICAN AMERICAN: 103 ML/MIN/1.73
SL AMB EGFR NON AFRICAN AMERICAN: 89 ML/MIN/1.73
SL AMB REFLEX CRITERIA: NORMAL
SL AMB VLDL CHOLESTEROL CALC: 13 MG/DL (ref 5–40)
SODIUM SERPL-SCNC: 140 MMOL/L (ref 134–144)
TRIGL SERPL-MCNC: 66 MG/DL (ref 0–149)
WBC # BLD AUTO: 8.3 X10E3/UL (ref 3.4–10.8)

## 2022-02-08 ENCOUNTER — OFFICE VISIT (OUTPATIENT)
Dept: FAMILY MEDICINE CLINIC | Facility: CLINIC | Age: 72
End: 2022-02-08
Payer: MEDICARE

## 2022-02-08 VITALS
SYSTOLIC BLOOD PRESSURE: 120 MMHG | BODY MASS INDEX: 28.63 KG/M2 | HEART RATE: 60 BPM | HEIGHT: 70 IN | WEIGHT: 200 LBS | DIASTOLIC BLOOD PRESSURE: 70 MMHG | TEMPERATURE: 96 F | RESPIRATION RATE: 16 BRPM

## 2022-02-08 DIAGNOSIS — I25.10 CORONARY ARTERY DISEASE INVOLVING NATIVE CORONARY ARTERY OF NATIVE HEART WITHOUT ANGINA PECTORIS: ICD-10-CM

## 2022-02-08 DIAGNOSIS — E78.2 MIXED HYPERLIPIDEMIA: ICD-10-CM

## 2022-02-08 DIAGNOSIS — I70.209 ATHEROSCLEROSIS OF ARTERIES OF EXTREMITIES (HCC): ICD-10-CM

## 2022-02-08 DIAGNOSIS — I21.9 MYOCARDIAL INFARCTION, UNSPECIFIED MI TYPE, UNSPECIFIED ARTERY (HCC): ICD-10-CM

## 2022-02-08 DIAGNOSIS — I11.0 HYPERTENSIVE HEART DISEASE WITH HEART FAILURE (HCC): ICD-10-CM

## 2022-02-08 DIAGNOSIS — E11.42 DIABETIC POLYNEUROPATHY ASSOCIATED WITH TYPE 2 DIABETES MELLITUS (HCC): ICD-10-CM

## 2022-02-08 DIAGNOSIS — G60.9 IDIOPATHIC PERIPHERAL NEUROPATHY: ICD-10-CM

## 2022-02-08 DIAGNOSIS — I10 ESSENTIAL HYPERTENSION: ICD-10-CM

## 2022-02-08 PROCEDURE — 99214 OFFICE O/P EST MOD 30 MIN: CPT | Performed by: FAMILY MEDICINE

## 2022-02-08 RX ORDER — CLOPIDOGREL BISULFATE 75 MG/1
75 TABLET ORAL DAILY
Qty: 90 TABLET | Refills: 1 | Status: SHIPPED | OUTPATIENT
Start: 2022-02-08

## 2022-02-08 RX ORDER — GABAPENTIN 400 MG/1
400 CAPSULE ORAL 4 TIMES DAILY
Qty: 360 CAPSULE | Refills: 1 | Status: SHIPPED | OUTPATIENT
Start: 2022-02-08 | End: 2022-08-09

## 2022-02-08 RX ORDER — METOPROLOL SUCCINATE 25 MG/1
12.5 TABLET, EXTENDED RELEASE ORAL
Qty: 90 TABLET | Refills: 1 | Status: SHIPPED | OUTPATIENT
Start: 2022-02-08

## 2022-02-08 RX ORDER — ATORVASTATIN CALCIUM 40 MG/1
40 TABLET, FILM COATED ORAL DAILY
Qty: 90 TABLET | Refills: 1 | Status: SHIPPED | OUTPATIENT
Start: 2022-02-08 | End: 2022-08-05

## 2022-02-08 RX ORDER — LANCETS
EACH MISCELLANEOUS DAILY
Qty: 100 EACH | Refills: 3 | Status: SHIPPED | OUTPATIENT
Start: 2022-02-08

## 2022-02-08 NOTE — ASSESSMENT & PLAN NOTE
Wt Readings from Last 3 Encounters:   02/08/22 90 7 kg (200 lb)   11/02/21 90 3 kg (199 lb)   10/05/21 89 8 kg (198 lb)       Stable  Blood pressure well controlled

## 2022-02-08 NOTE — PROGRESS NOTES
Assessment/Plan:    1  Hypertensive heart disease with heart failure [facility])  Assessment & Plan:  Wt Readings from Last 3 Encounters:   02/08/22 90 7 kg (200 lb)   11/02/21 90 3 kg (199 lb)   10/05/21 89 8 kg (198 lb)       Stable  Blood pressure well controlled         2  Diabetic polyneuropathy associated with type 2 diabetes mellitus ([facility] [address] )  Assessment & Plan:    Lab Results   Component Value Date    HGBA1C 6 5 (H) 02/01/2022     Well controlled    Orders:  -     Microlet Lancets MISC; Use daily  -     Hemoglobin A1C; Future; Expected date: 04/24/2022  -     Hemoglobin A1C    3  Atherosclerosis of arteries of extremities (HCC)  Assessment & Plan:  Stable       4  Essential hypertension  -     metoprolol succinate (TOPROL-XL) 25 mg 24 hr tablet; Take 0 5 tablets (12 5 mg total) by mouth daily at bedtime  -     CBC; Future; Expected date: 04/24/2022  -     Comprehensive metabolic panel; Future; Expected date: 04/24/2022  -     Lipid Panel with Direct LDL reflex; Future; Expected date: 04/24/2022  -     CBC  -     Comprehensive metabolic panel  -     Lipid Panel with Direct LDL reflex    5  Idiopathic peripheral neuropathy  -     gabapentin (NEURONTIN) 400 mg capsule; Take 1 capsule (400 mg total) by mouth 4 (four) times a day    6  Mixed hyperlipidemia  -     atorvastatin (LIPITOR) 40 mg tablet; Take 1 tablet (40 mg total) by mouth daily  -     Comprehensive metabolic panel; Future; Expected date: 04/24/2022  -     Lipid Panel with Direct LDL reflex; Future; Expected date: 04/24/2022  -     Comprehensive metabolic panel  -     Lipid Panel with Direct LDL reflex    7  Coronary artery disease involving native coronary artery of native heart without angina pectoris  -     atorvastatin (LIPITOR) 40 mg tablet; Take 1 tablet (40 mg total) by mouth daily    8  Myocardial infarction, unspecified MI type, unspecified artery (HCC)  -     clopidogrel (PLAVIX) 75 mg tablet;  Take 1 tablet (75 mg total) by mouth daily    BMI Counseling: Body mass index is 28 7 kg/m²  The BMI is above normal  Nutrition recommendations include moderation in carbohydrate intake  Rationale for BMI follow-up plan is due to patient being overweight or obese  Depression Screening and Follow-up Plan: Patient was screened for depression during today's encounter  They screened negative with a PHQ-2 score of 0  There are no Patient Instructions on file for this visit  Return in about 3 months (around 5/8/2022) for Next scheduled follow up  Subjective:      Patient ID: Ac Cassidy is a 80 y o  male  Chief Complaint   Patient presents with    Follow-up     Review labs [address]    Diabetes       He is staying active  He is watching his diet  Diabetes-patient has not had any problems with low blood sugars  He is checking his blood sugar regularly  Hypertension-patient is taking his blood pressure medicine regularly    He is not having any issues swelling      The following portions of the patient's history were reviewed and updated as appropriate: allergies, current medications, past family history, past medical history, past social history, past surgical history and problem list     Review of Systems      Current Outpatient Medications   Medication Sig Dispense Refill    aspirin (ECOTRIN LOW STRENGTH) 81 mg EC tablet Take 1 tablet (81 mg total) by mouth daily 30 tablet 5    atorvastatin (LIPITOR) 40 mg tablet Take 1 tablet (40 mg total) by mouth daily 90 tablet 1    clopidogrel (PLAVIX) 75 mg tablet Take 1 tablet (75 mg total) by mouth daily 90 tablet 1    gabapentin (NEURONTIN) 100 mg capsule Take 1 capsule (100 mg total) by mouth 2 (two) times a day 180 capsule 1    gabapentin (NEURONTIN) 400 mg capsule Take 1 capsule (400 mg total) by mouth 4 (four) times a day 360 capsule 1    glucose blood (Contour Next Test) test strip Use 1 each daily Test blood glucose 100 each 3    metFORMIN (GLUCOPHAGE) 500 mg tablet Take 1 tablet (500 mg total) by mouth daily with breakfast 90 tablet 3    metoprolol succinate (TOPROL-XL) 25 mg 24 hr tablet Take 0 5 tablets (12 5 mg total) by mouth daily at bedtime 90 tablet 1    Microlet Lancets MISC Use daily 100 each 3     No current facility-administered medications for this visit  Objective:    /70   Pulse 60   Temp (!) 96 °F (35 6 °C)   Resp 16   Ht 5' 10" (1 778 m)   Wt 90 7 kg (200 lb)   BMI 28 70 kg/m²        Physical Exam  Vitals and nursing note reviewed  Constitutional:       Appearance: He is well-developed  HENT:      Head: Normocephalic and atraumatic  Right Ear: Tympanic membrane and external ear normal       Left Ear: Tympanic membrane and external ear normal    Cardiovascular:      Rate and Rhythm: Normal rate and regular rhythm  Heart sounds: Normal heart sounds  No murmur heard  Pulmonary:      Effort: Pulmonary effort is normal  No respiratory distress  Breath sounds: Normal breath sounds  No wheezing or rales  Musculoskeletal:      Right lower leg: No edema  Left lower leg: No edema                  Celena Mtz DO

## 2022-04-07 ENCOUNTER — OFFICE VISIT (OUTPATIENT)
Dept: CARDIOLOGY CLINIC | Facility: CLINIC | Age: 72
End: 2022-04-07
Payer: MEDICARE

## 2022-04-07 VITALS
HEART RATE: 52 BPM | HEIGHT: 70 IN | SYSTOLIC BLOOD PRESSURE: 120 MMHG | DIASTOLIC BLOOD PRESSURE: 84 MMHG | WEIGHT: 201 LBS | OXYGEN SATURATION: 98 % | BODY MASS INDEX: 28.77 KG/M2 | TEMPERATURE: 97.7 F

## 2022-04-07 DIAGNOSIS — I10 ESSENTIAL HYPERTENSION: ICD-10-CM

## 2022-04-07 DIAGNOSIS — I70.209 ATHEROSCLEROSIS OF ARTERIES OF EXTREMITIES (HCC): ICD-10-CM

## 2022-04-07 DIAGNOSIS — R00.1 SINUS BRADYCARDIA: ICD-10-CM

## 2022-04-07 DIAGNOSIS — I11.0 HYPERTENSIVE HEART DISEASE WITH HEART FAILURE (HCC): ICD-10-CM

## 2022-04-07 DIAGNOSIS — E78.2 MIXED HYPERLIPIDEMIA: ICD-10-CM

## 2022-04-07 DIAGNOSIS — I25.10 CORONARY ARTERY DISEASE INVOLVING NATIVE CORONARY ARTERY OF NATIVE HEART WITHOUT ANGINA PECTORIS: Primary | ICD-10-CM

## 2022-04-07 DIAGNOSIS — I21.9 MYOCARDIAL INFARCTION, UNSPECIFIED MI TYPE, UNSPECIFIED ARTERY (HCC): ICD-10-CM

## 2022-04-07 PROCEDURE — 93000 ELECTROCARDIOGRAM COMPLETE: CPT | Performed by: INTERNAL MEDICINE

## 2022-04-07 PROCEDURE — 99214 OFFICE O/P EST MOD 30 MIN: CPT | Performed by: INTERNAL MEDICINE

## 2022-04-07 NOTE — PROGRESS NOTES
Cardiology Followup     Dragan Begum  [id number]  1950  [facility]  [address] [address] [id number]    Consult for: CAD    Interval History: Dragan Begum is a 80y o  year old male  who is here for followup of CAD  Since his last visit, he has been feeling well   he denies any palpitations, chest pain, shortness of breath, LE edema, orthopnea or PND  Diet is overall unchanged  There has not been a significant change in weight  Most recent blood work showed LDL of 73 mg/dL  Past Cardiac History: In February 2012, he had a myocardial infarction where he felt a band like pain across the front of his chest   No associated dyspnea  He underwent PCI *2 to mid LAD with Xience 3 5mm * 12mm  and 4 0 mm * 12mm stents              Past Medical History:   Diagnosis Date    Acquired ankle/foot deformity     last assessed: 05/30/2017    Acute myocardial infarction [facility])     last assessed: 12/03/2013    Anemia     last assessed: 12/03/2013    Anxiety     last assessed: 12/03/2013    Cardiomyopathy ([facility] [address] )     last assessed: 12/03/2013    Congestive heart failure (CHF) ([facility] [address] )     onset: 02/29/2012    Coronary artery disease     Diabetes mellitus ([facility] [address] )     Dysesthesia     last assessed: 09/10/2014    Exposure to hepatitis     last assessed: 11/15/2016    Foot slap     last assessed: 03/17/2016    Hypertension     Myocardial infarction [facility])     Palsy of left sciatic nerve     last assessed: 03/17/2016    Subconjunctival hemorrhage     last assessed: 03/03/2014    Xerosis cutis     last assessed: 10/25/2016     Social History     Socioeconomic History    Marital status:      Spouse name: Not on file    Number of children: Not on file    Years of education: Not on file    Highest education level: Not on file   Occupational History    Not on file   Tobacco Use    Smoking status: Never Smoker    Smokeless tobacco: Never Used   Vaping Use    Vaping Use: Never used   Substance and Sexual Activity    Alcohol use: No    Drug use: No    Sexual activity: Not on file   Other Topics Concern    Not on file   Social History Narrative    Not on file     Social Determinants of Health     Financial Resource Strain: Not on file   Food Insecurity: Not on file   Transportation Needs: Not on file   Physical Activity: Not on file   Stress: Not on file   Social Connections: Not on file   Intimate Partner Violence: Not on file   Housing Stability: Not on file      Family History   Problem Relation Age of Onset    Heart disease Mother         cardiac disorder    Leukemia Father     Ovarian cancer Sister      Past Surgical History:   Procedure Laterality Date    ANGIOPLASTY      2 coronary stents    CHOLECYSTECTOMY      COLONOSCOPY N/A 3/17/2016    Procedure: COLONOSCOPY;  Surgeon: Matias Branch MD;  Location: [place] [address] GI LAB;   Service:     HERNIA REPAIR      right inguinal        Current Outpatient Medications:     aspirin (ECOTRIN LOW STRENGTH) 81 mg EC tablet, Take 1 tablet (81 mg total) by mouth daily, Disp: 30 tablet, Rfl: 5    atorvastatin (LIPITOR) 40 mg tablet, Take 1 tablet (40 mg total) by mouth daily, Disp: 90 tablet, Rfl: 1    clopidogrel (PLAVIX) 75 mg tablet, Take 1 tablet (75 mg total) by mouth daily, Disp: 90 tablet, Rfl: 1    gabapentin (NEURONTIN) 100 mg capsule, Take 1 capsule (100 mg total) by mouth 2 (two) times a day, Disp: 180 capsule, Rfl: 1    gabapentin (NEURONTIN) 400 mg capsule, Take 1 capsule (400 mg total) by mouth 4 (four) times a day, Disp: 360 capsule, Rfl: 1    glucose blood (Contour Next Test) test strip, Use 1 each daily Test blood glucose, Disp: 100 each, Rfl: 3    metFORMIN (GLUCOPHAGE) 500 mg tablet, Take 1 tablet (500 mg total) by mouth daily with breakfast, Disp: 90 tablet, Rfl: 3    metoprolol succinate (TOPROL-XL) 25 mg 24 hr tablet, Take 0 5 tablets (12 5 mg total) by mouth daily at bedtime, Disp: 90 tablet, Rfl: 1    Microlet Lancets MISC, Use daily, Disp: 100 each, Rfl: 3  No Known Allergies      Review of Systems:  Review of Systems   Constitutional: Negative for fatigue  Respiratory: Negative for shortness of breath  Cardiovascular: Negative for chest pain, palpitations and leg swelling  All other systems reviewed and are negative  Physical Exam:  Vitals:    04/07/22 0755   BP: 120/84   BP Location: Right arm   Patient Position: Sitting   Cuff Size: Large   Pulse: (!) 52   Temp: 97 7 °F (36 5 °C)   SpO2: 98%   Weight: 91 2 kg (201 lb)   Height: 5' 10" (1 778 m)     Physical Exam  Constitutional:       General: He is not in acute distress  Appearance: He is well-developed  He is not diaphoretic  HENT:      Head: Normocephalic and atraumatic  Eyes:      Conjunctiva/sclera: Conjunctivae normal       Pupils: Pupils are equal, round, and reactive to light  Neck:      Thyroid: No thyromegaly  Vascular: No JVD  Cardiovascular:      Rate and Rhythm: Regular rhythm  Bradycardia present  Heart sounds: Normal heart sounds  No murmur heard  No friction rub  No gallop  Pulmonary:      Effort: Pulmonary effort is normal       Breath sounds: Normal breath sounds  Musculoskeletal:      Cervical back: Neck supple  Skin:     General: Skin is warm and dry  Findings: No erythema or rash  Neurological:      General: No focal deficit present  Mental Status: He is alert and oriented to person, place, and time  Cranial Nerves: No cranial nerve deficit  Psychiatric:         Mood and Affect: Mood normal          Behavior: Behavior normal          Thought Content:  Thought content normal          Judgment: Judgment normal          Labs:  Lab Results   Component Value Date     12/16/2017    K 5 1 02/01/2022     02/01/2022    CO2 27 02/01/2022    BUN 15 02/01/2022    CREATININE 0 84 02/01/2022    CREATININE 0 97 12/16/2017 GLUCOSE 93 12/16/2017    CALCIUM 9 7 12/16/2017     Lab Results   Component Value Date    WBC 8 3 02/01/2022    WBC 8 3 12/09/2016    HGB 15 7 02/01/2022    HGB 16 0 12/09/2016    HCT 47 7 02/01/2022    HCT 48 4 12/09/2016    MCV 87 02/01/2022    MCV 89 12/09/2016     02/01/2022     12/09/2016     Lab Results   Component Value Date    CHOL 153 12/16/2017    TRIG 66 02/01/2022    HDL 58 02/01/2022    LDLDIRECT 80 10/14/2014     Imaging: No results found  EKG (independently reviewed):  Sinus bradycardia at 55 beats per minute with nonspecific T-wave abnormalities    Discussion/Summary:  1  Coronary artery disease involving native coronary artery of native heart without angina pectoris  -  Last stress test was normal without any ischemia  Good functional capacity and normal BP response  2  Essential hypertension  - Continue benazepril   - No proteinuria on last urine study  3  Diabetic polyneuropathy associated with type 2 diabetes mellitus ([facility] [address] )  - Continue metformin    4  Mixed hyperlipidemia  - Last LDL was 73 mg/dL  He is taking atorvastatin 40 mg daily  Given history of CAD, ideal LDL should be < 70 (or <40)  Previously, LDL was 40-45  Discussed diet with patient  He has increased dessert intake over the past several months  He has recently reduced it  Will recheck next visit  If LDL not below 70, may add Zetia 10 mg daily  5  History of myocardial infarction in adulthood  - reviewed antiplatelet regimen  He wishes to remain on Plavix in spite of stent being placed over 5 years ago  He is aware of bleeding risk  Will continue aspirin 81 mg as well

## 2022-05-04 ENCOUNTER — TELEPHONE (OUTPATIENT)
Dept: ADMINISTRATIVE | Facility: OTHER | Age: 72
End: 2022-05-04

## 2022-05-04 LAB
ALBUMIN SERPL-MCNC: 4.5 G/DL (ref 3.7–4.7)
ALBUMIN/GLOB SERPL: 2 {RATIO} (ref 1.2–2.2)
ALP SERPL-CCNC: 75 IU/L (ref 44–121)
ALT SERPL-CCNC: 20 IU/L (ref 0–44)
AST SERPL-CCNC: 15 IU/L (ref 0–40)
BILIRUB SERPL-MCNC: 0.6 MG/DL (ref 0–1.2)
BUN SERPL-MCNC: 13 MG/DL (ref 8–27)
BUN/CREAT SERPL: 15 (ref 10–24)
CALCIUM SERPL-MCNC: 9.5 MG/DL (ref 8.6–10.2)
CHLORIDE SERPL-SCNC: 103 MMOL/L (ref 96–106)
CHOLEST SERPL-MCNC: 128 MG/DL (ref 100–199)
CO2 SERPL-SCNC: 26 MMOL/L (ref 20–29)
CREAT SERPL-MCNC: 0.84 MG/DL (ref 0.76–1.27)
EGFR: 93 ML/MIN/1.73
ERYTHROCYTE [DISTWIDTH] IN BLOOD BY AUTOMATED COUNT: 13.2 % (ref 11.6–15.4)
EST. AVERAGE GLUCOSE BLD GHB EST-MCNC: 148 MG/DL
GLOBULIN SER-MCNC: 2.2 G/DL (ref 1.5–4.5)
GLUCOSE SERPL-MCNC: 104 MG/DL (ref 65–99)
HBA1C MFR BLD: 6.8 % (ref 4.8–5.6)
HCT VFR BLD AUTO: 47.9 % (ref 37.5–51)
HDLC SERPL-MCNC: 51 MG/DL
HGB BLD-MCNC: 15.6 G/DL (ref 13–17.7)
LDLC SERPL CALC-MCNC: 63 MG/DL (ref 0–99)
LDLC/HDLC SERPL: 1.2 RATIO (ref 0–3.6)
MCH RBC QN AUTO: 28.7 PG (ref 26.6–33)
MCHC RBC AUTO-ENTMCNC: 32.6 G/DL (ref 31.5–35.7)
MCV RBC AUTO: 88 FL (ref 79–97)
MICRODELETION SYND BLD/T FISH: NORMAL
PLATELET # BLD AUTO: 232 X10E3/UL (ref 150–450)
POTASSIUM SERPL-SCNC: 5.2 MMOL/L (ref 3.5–5.2)
PROT SERPL-MCNC: 6.7 G/DL (ref 6–8.5)
RBC # BLD AUTO: 5.44 X10E6/UL (ref 4.14–5.8)
SL AMB VLDL CHOLESTEROL CALC: 14 MG/DL (ref 5–40)
SODIUM SERPL-SCNC: 143 MMOL/L (ref 134–144)
TRIGL SERPL-MCNC: 70 MG/DL (ref 0–149)
WBC # BLD AUTO: 8.6 X10E3/UL (ref 3.4–10.8)

## 2022-05-04 NOTE — TELEPHONE ENCOUNTER
Upon review of the In Basket request we were able to locate, review, and update the patient chart as requested for Diabetic Foot Exam     Any additional questions or concerns should be emailed to the Practice Liaisons via [email or web address] email, please do not reply via In Basket      Thank you  Stephenie Clarke, [address]

## 2022-05-04 NOTE — TELEPHONE ENCOUNTER
----- Message from Donna Reagan MA sent at 5/3/2022 11:54 AM EDT -----  Regarding: diabetic foot exam  05/03/22 11:55 AM    Hello, our patient Isabel Li has had Diabetic Foot Exam completed/performed  Please assist in updating the patient chart by pulling the document from the Media Tab  The date of service is 05/02/2022       Thank you,  Donna Reagan MA  [facility]

## 2022-05-10 ENCOUNTER — OFFICE VISIT (OUTPATIENT)
Dept: FAMILY MEDICINE CLINIC | Facility: CLINIC | Age: 72
End: 2022-05-10
Payer: MEDICARE

## 2022-05-10 VITALS
BODY MASS INDEX: 28.92 KG/M2 | HEART RATE: 58 BPM | SYSTOLIC BLOOD PRESSURE: 110 MMHG | OXYGEN SATURATION: 98 % | DIASTOLIC BLOOD PRESSURE: 66 MMHG | WEIGHT: 202 LBS | TEMPERATURE: 98.2 F | HEIGHT: 70 IN | RESPIRATION RATE: 18 BRPM

## 2022-05-10 DIAGNOSIS — E11.36 TYPE 2 DIABETES MELLITUS WITH DIABETIC CATARACT, WITHOUT LONG-TERM CURRENT USE OF INSULIN (HCC): Primary | ICD-10-CM

## 2022-05-10 DIAGNOSIS — E78.2 MIXED HYPERLIPIDEMIA: ICD-10-CM

## 2022-05-10 DIAGNOSIS — I10 ESSENTIAL HYPERTENSION: ICD-10-CM

## 2022-05-10 PROCEDURE — 99214 OFFICE O/P EST MOD 30 MIN: CPT | Performed by: FAMILY MEDICINE

## 2022-05-10 NOTE — PROGRESS NOTES
Assessment/Plan:    1  Type 2 diabetes mellitus with diabetic cataract, without long-term current use of insulin ([facility])  Assessment & Plan:    Lab Results   Component Value Date    HGBA1C 6 8 (H) 05/03/2022     Diabetes remains well controlled although A1c is up to 6 8   Patient is going to increase his exercise level  He would like to get his A1c back down    Orders:  -     Hemoglobin A1C; Future; Expected date: 07/24/2022  -     Microalbumin / creatinine urine ratio; Future; Expected date: 07/24/2022  -     Hemoglobin A1C  -     Microalbumin / creatinine urine ratio    2  Mixed hyperlipidemia  Assessment & Plan:  Well controlled  Continue atorvastatin 40 mg daily    Orders:  -     Comprehensive metabolic panel; Future; Expected date: 07/24/2022  -     Lipid Panel with Direct LDL reflex; Future; Expected date: 07/24/2022  -     Comprehensive metabolic panel  -     Lipid Panel with Direct LDL reflex    3  Essential hypertension  Assessment & Plan:  Well controlled  Continue metoprolol 12 5 mg daily    Orders:  -     CBC; Future; Expected date: 07/24/2022  -     Comprehensive metabolic panel; Future; Expected date: 07/24/2022  -     Lipid Panel with Direct LDL reflex; Future; Expected date: 07/24/2022  -     CBC  -     Comprehensive metabolic panel  -     Lipid Panel with Direct LDL reflex        Depression Screening and Follow-up Plan: Patient was screened for depression during today's encounter  They screened negative with a PHQ-2 score of 0  There are no Patient Instructions on file for this visit  Return in about 3 months (around 8/10/2022) for Annual Wellness Visit  Subjective:      Patient ID: Josue Parra is a 71 y o  male  Chief Complaint   Patient presents with    Follow-up     3 months  kl       Patient reports that he is feeling well  He has been staying active  He has been helping his neighbor moving into a new place and thinks he has been overeating after all the physical labor  He is not having problems any low blood sugars  He also has been having some stress  One of his daughters is having marital issues  The following portions of the patient's history were reviewed and updated as appropriate:  past social history    Review of Systems      Current Outpatient Medications   Medication Sig Dispense Refill    aspirin (ECOTRIN LOW STRENGTH) 81 mg EC tablet Take 1 tablet (81 mg total) by mouth daily 30 tablet 5    atorvastatin (LIPITOR) 40 mg tablet Take 1 tablet (40 mg total) by mouth daily 90 tablet 1    clopidogrel (PLAVIX) 75 mg tablet Take 1 tablet (75 mg total) by mouth daily 90 tablet 1    gabapentin (NEURONTIN) 100 mg capsule Take 1 capsule (100 mg total) by mouth 2 (two) times a day 180 capsule 1    gabapentin (NEURONTIN) 400 mg capsule Take 1 capsule (400 mg total) by mouth 4 (four) times a day 360 capsule 1    glucose blood (Contour Next Test) test strip Use 1 each daily Test blood glucose 100 each 3    metFORMIN (GLUCOPHAGE) 500 mg tablet Take 1 tablet (500 mg total) by mouth daily with breakfast 90 tablet 3    metoprolol succinate (TOPROL-XL) 25 mg 24 hr tablet Take 0 5 tablets (12 5 mg total) by mouth daily at bedtime 90 tablet 1    Microlet Lancets MISC Use daily 100 each 3     No current facility-administered medications for this visit  Objective:    /66   Pulse 58   Temp 98 2 °F (36 8 °C)   Resp 18   Ht 5' 10" (1 778 m)   Wt 91 6 kg (202 lb)   SpO2 98%   BMI 28 98 kg/m²      Physical Exam  Vitals and nursing note reviewed  Constitutional:       Appearance: He is well-developed  HENT:      Head: Normocephalic and atraumatic  Right Ear: Tympanic membrane and external ear normal       Left Ear: Tympanic membrane and external ear normal    Cardiovascular:      Rate and Rhythm: Normal rate and regular rhythm  Heart sounds: Normal heart sounds  No murmur heard        Pulmonary:      Effort: Pulmonary effort is normal  No respiratory distress  Breath sounds: Normal breath sounds  No wheezing or rales  Musculoskeletal:      Right lower leg: No edema  Left lower leg: No edema               Taylor Reus, DO

## 2022-05-10 NOTE — ASSESSMENT & PLAN NOTE
Lab Results   Component Value Date    HGBA1C 6 8 (H) 05/03/2022     Diabetes remains well controlled although A1c is up to 6 8   Patient is going to increase his exercise level    He would like to get his A1c back down

## 2022-05-12 DIAGNOSIS — G60.9 IDIOPATHIC PERIPHERAL NEUROPATHY: ICD-10-CM

## 2022-05-13 RX ORDER — GABAPENTIN 100 MG/1
CAPSULE ORAL
Qty: 180 CAPSULE | Refills: 1 | Status: SHIPPED | OUTPATIENT
Start: 2022-05-13

## 2022-08-05 DIAGNOSIS — E78.2 MIXED HYPERLIPIDEMIA: ICD-10-CM

## 2022-08-05 DIAGNOSIS — I25.10 CORONARY ARTERY DISEASE INVOLVING NATIVE CORONARY ARTERY OF NATIVE HEART WITHOUT ANGINA PECTORIS: ICD-10-CM

## 2022-08-05 RX ORDER — ATORVASTATIN CALCIUM 40 MG/1
TABLET, FILM COATED ORAL
Qty: 90 TABLET | Refills: 1 | Status: SHIPPED | OUTPATIENT
Start: 2022-08-05

## 2022-08-09 DIAGNOSIS — G60.9 IDIOPATHIC PERIPHERAL NEUROPATHY: ICD-10-CM

## 2022-08-10 RX ORDER — GABAPENTIN 400 MG/1
CAPSULE ORAL
Qty: 360 CAPSULE | Refills: 1 | Status: SHIPPED | OUTPATIENT
Start: 2022-08-10

## 2022-08-11 ENCOUNTER — RA CDI HCC (OUTPATIENT)
Dept: OTHER | Facility: HOSPITAL | Age: 72
End: 2022-08-11

## 2022-08-11 NOTE — PROGRESS NOTES
[address]  coding opportunities       Chart reviewed, no opportunity found: CHART REVIEWED, NO OPPORTUNITY FOUND        Patients Insurance     Medicare Insurance: Medicare

## 2022-08-12 LAB
ALBUMIN SERPL-MCNC: 4.8 G/DL (ref 3.7–4.7)
ALBUMIN/CREAT UR: 7 MG/G CREAT (ref 0–29)
ALBUMIN/GLOB SERPL: 2.2 {RATIO} (ref 1.2–2.2)
ALP SERPL-CCNC: 66 IU/L (ref 44–121)
ALT SERPL-CCNC: 29 IU/L (ref 0–44)
AST SERPL-CCNC: 20 IU/L (ref 0–40)
BILIRUB SERPL-MCNC: 0.6 MG/DL (ref 0–1.2)
BUN SERPL-MCNC: 16 MG/DL (ref 8–27)
BUN/CREAT SERPL: 18 (ref 10–24)
CALCIUM SERPL-MCNC: 9.5 MG/DL (ref 8.6–10.2)
CHLORIDE SERPL-SCNC: 103 MMOL/L (ref 96–106)
CHOLEST SERPL-MCNC: 115 MG/DL (ref 100–199)
CO2 SERPL-SCNC: 26 MMOL/L (ref 20–29)
CREAT SERPL-MCNC: 0.87 MG/DL (ref 0.76–1.27)
CREAT UR-MCNC: 141.6 MG/DL
EGFR: 92 ML/MIN/1.73
ERYTHROCYTE [DISTWIDTH] IN BLOOD BY AUTOMATED COUNT: 13.1 % (ref 11.6–15.4)
EST. AVERAGE GLUCOSE BLD GHB EST-MCNC: 137 MG/DL
GLOBULIN SER-MCNC: 2.2 G/DL (ref 1.5–4.5)
GLUCOSE SERPL-MCNC: 101 MG/DL (ref 65–99)
HBA1C MFR BLD: 6.4 % (ref 4.8–5.6)
HCT VFR BLD AUTO: 48.1 % (ref 37.5–51)
HDLC SERPL-MCNC: 46 MG/DL
HGB BLD-MCNC: 16 G/DL (ref 13–17.7)
LDLC SERPL CALC-MCNC: 55 MG/DL (ref 0–99)
LDLC/HDLC SERPL: 1.2 RATIO (ref 0–3.6)
MCH RBC QN AUTO: 28.9 PG (ref 26.6–33)
MCHC RBC AUTO-ENTMCNC: 33.3 G/DL (ref 31.5–35.7)
MCV RBC AUTO: 87 FL (ref 79–97)
MICROALBUMIN UR-MCNC: 9.6 UG/ML
MICRODELETION SYND BLD/T FISH: NORMAL
PLATELET # BLD AUTO: 221 X10E3/UL (ref 150–450)
POTASSIUM SERPL-SCNC: 4.7 MMOL/L (ref 3.5–5.2)
PROT SERPL-MCNC: 7 G/DL (ref 6–8.5)
RBC # BLD AUTO: 5.54 X10E6/UL (ref 4.14–5.8)
SL AMB VLDL CHOLESTEROL CALC: 14 MG/DL (ref 5–40)
SODIUM SERPL-SCNC: 141 MMOL/L (ref 134–144)
TRIGL SERPL-MCNC: 69 MG/DL (ref 0–149)
WBC # BLD AUTO: 8.2 X10E3/UL (ref 3.4–10.8)

## 2022-09-01 ENCOUNTER — OFFICE VISIT (OUTPATIENT)
Dept: FAMILY MEDICINE CLINIC | Facility: CLINIC | Age: 72
End: 2022-09-01
Payer: MEDICARE

## 2022-09-01 VITALS
RESPIRATION RATE: 18 BRPM | HEART RATE: 52 BPM | TEMPERATURE: 97.2 F | WEIGHT: 189 LBS | DIASTOLIC BLOOD PRESSURE: 70 MMHG | HEIGHT: 70 IN | OXYGEN SATURATION: 98 % | SYSTOLIC BLOOD PRESSURE: 110 MMHG | BODY MASS INDEX: 27.06 KG/M2

## 2022-09-01 DIAGNOSIS — I10 ESSENTIAL HYPERTENSION: ICD-10-CM

## 2022-09-01 DIAGNOSIS — E11.36 TYPE 2 DIABETES MELLITUS WITH DIABETIC CATARACT, WITHOUT LONG-TERM CURRENT USE OF INSULIN (HCC): ICD-10-CM

## 2022-09-01 DIAGNOSIS — Z00.00 MEDICARE ANNUAL WELLNESS VISIT, SUBSEQUENT: Primary | ICD-10-CM

## 2022-09-01 PROCEDURE — G0439 PPPS, SUBSEQ VISIT: HCPCS | Performed by: FAMILY MEDICINE

## 2022-09-01 NOTE — PROGRESS NOTES
Assessment and Plan:     Problem List Items Addressed This Visit     Essential hypertension    Relevant Orders    CBC    Comprehensive metabolic panel    Lipid Panel with Direct LDL reflex    Type 2 diabetes mellitus with diabetic cataract, without long-term current use of insulin ([facility] )       Lab Results   Component Value Date    HGBA1C 6 4 (H) 08/11/2022   Diabetes is well controlled         Relevant Orders    Hemoglobin A1C      Other Visit Diagnoses     Medicare annual wellness visit, subsequent    -  Primary      Return in about 3 months (around 12/1/2022) for Diabetic follow up  Depression Screening and Follow-up Plan: Patient was screened for depression during today's encounter  They screened negative with a PHQ-2 score of 0  Preventive health issues were discussed with patient, and age appropriate screening tests were ordered as noted in patient's After Visit Summary  Personalized health advice and appropriate referrals for health education or preventive services given if needed, as noted in patient's After Visit Summary  History of Present Illness:     Patient presents for a Medicare Wellness Visit    He reports that he has been feeling well  He has been trying to watch his diet and lose weight with his lady friend       Patient Care Team:  Frankie Connelly DO as PCP - MD Rashaad Martínez OD Bedelia Gauss, Bry Hylton MD as Endoscopist  Mara Moses DO (Cardiology)     Review of Systems:     Review of Systems     Problem List:     Patient Active Problem List   Diagnosis    Essential hypertension    Diabetic polyneuropathy associated with type 2 diabetes mellitus ([facility] )    Mixed hyperlipidemia    Impotence    Myocardial infarction ([facility] )    Nerve palsy    Onychomycosis    Peripheral neuropathy    Reflex sympathetic dystrophy    Tabes dorsalis    Atherosclerosis of arteries of extremities ([facility] )    Hyperkalemia    Coronary artery disease involving native coronary artery of native heart without angina pectoris    Anxiety    CHF (congestive heart failure) ([place] [address] )    History of PTCA with stents    Hypertensive heart disease with heart failure (HCC)    Type 2 diabetes mellitus with diabetic cataract, without long-term current use of insulin [facility])      Past Medical and Surgical History:     Past Medical History:   Diagnosis Date    Acquired ankle/foot deformity     last assessed: 05/30/2017    Acute myocardial infarction [facility])     last assessed: 12/03/2013    Anemia     last assessed: 12/03/2013    Anxiety     last assessed: 12/03/2013    Cardiomyopathy ([place] [address] )     last assessed: 12/03/2013    Congestive heart failure (CHF) ([place] [address] )     onset: 02/29/2012    Coronary artery disease     Diabetes mellitus ([place] [address] )     Dysesthesia     last assessed: 09/10/2014    Exposure to hepatitis     last assessed: 11/15/2016    Foot slap     last assessed: 03/17/2016    Hypertension     Myocardial infarction [facility])     Palsy of left sciatic nerve     last assessed: 03/17/2016    Subconjunctival hemorrhage     last assessed: 03/03/2014    Xerosis cutis     last assessed: 10/25/2016     Past Surgical History:   Procedure Laterality Date    ANGIOPLASTY      2 coronary stents    CHOLECYSTECTOMY      COLONOSCOPY N/A 3/17/2016    Procedure: COLONOSCOPY;  Surgeon: Leah Davidson MD;  Location: [place] [address] GI LAB;   Service:     HERNIA REPAIR      right inguinal       Family History:     Family History   Problem Relation Age of Onset    Heart disease Mother         cardiac disorder    Leukemia Father     Ovarian cancer Sister       Social History:     Social History     Socioeconomic History    Marital status:      Spouse name: None    Number of children: None    Years of education: None    Highest education level: None   Occupational History    None   Tobacco Use    Smoking status: Never Smoker    Smokeless tobacco: Never Used   Vaping Use    Vaping Use: Never used Substance and Sexual Activity    Alcohol use: No    Drug use: No    Sexual activity: None   Other Topics Concern    None   Social History Narrative    None     Social Determinants of Health     Financial Resource Strain: Low Risk     Difficulty of Paying Living Expenses: Not hard at all   Food Insecurity: Not on file   Transportation Needs: No Transportation Needs    Lack of Transportation (Medical): No    Lack of Transportation (Non-Medical): No   Physical Activity: Not on file   Stress: Not on file   Social Connections: Not on file   Intimate Partner Violence: Not on file   Housing Stability: Not on file      Medications and Allergies:     Current Outpatient Medications   Medication Sig Dispense Refill    aspirin (ECOTRIN LOW STRENGTH) 81 mg EC tablet Take 1 tablet (81 mg total) by mouth daily 30 tablet 5    atorvastatin (LIPITOR) 40 mg tablet TAKE ONE TABLET BY MOUTH EVERY DAY 90 tablet 1    clopidogrel (PLAVIX) 75 mg tablet Take 1 tablet (75 mg total) by mouth daily 90 tablet 1    gabapentin (NEURONTIN) 100 mg capsule TAKE ONE CAPSULE BY MOUTH TWICE A  capsule 1    gabapentin (NEURONTIN) 400 mg capsule TAKE ONE CAPSULE BY MOUTH FOUR TIMES A  capsule 1    glucose blood (Contour Next Test) test strip Use 1 each daily Test blood glucose 100 each 3    metFORMIN (GLUCOPHAGE) 500 mg tablet Take 1 tablet (500 mg total) by mouth daily with breakfast 90 tablet 3    metoprolol succinate (TOPROL-XL) 25 mg 24 hr tablet Take 0 5 tablets (12 5 mg total) by mouth daily at bedtime 90 tablet 1    Microlet Lancets MISC Use daily 100 each 3     No current facility-administered medications for this visit       No Known Allergies   Immunizations:     Immunization History   Administered Date(s) Administered    COVID-19 MODERNA VACC 0 25 ML IM BOOSTER 11/05/2021    COVID-19 MODERNA VACC 0 5 ML IM 02/10/2021, 03/10/2021, 04/07/2022    INFLUENZA 09/01/2018, 09/26/2019    Influenza Quadrivalent Preservative Free 3 years and older IM 10/21/2014, 10/15/2015    Influenza Split High Dose Preservative Free IM 09/08/2016    Influenza, high dose seasonal 0 7 mL 09/01/2020, 08/31/2021    Influenza, seasonal, injectable 08/25/2017    Pneumococcal Conjugate 13-Valent 11/17/2015    Pneumococcal Polysaccharide PPV23 10/21/2014, 09/27/2017    Tdap 10/07/2016    Unknown 02/20/2018, 05/15/2018    Zoster 10/21/2014, 03/20/2018    Zoster Vaccine Recombinant 05/15/2018      Health Maintenance:         Topic Date Due    Colorectal Cancer Screening  04/27/2026    Hepatitis C Screening  Completed         Topic Date Due    Influenza Vaccine (1) 09/01/2022      Medicare Screening Tests and Risk Assessments:     Sulma Pink is here for his Subsequent Wellness visit  Last Medicare Wellness visit information reviewed, patient interviewed and updates made to the record today  Health Risk Assessment:   Patient rates overall health as very good  Patient feels that their physical health rating is slightly better  Patient is satisfied with their life  Eyesight was rated as same  Hearing was rated as same  Patient feels that their emotional and mental health rating is same  Patients states they are never, rarely angry  Patient states they are never, rarely unusually tired/fatigued  Pain experienced in the last 7 days has been none  Patient states that he has experienced no weight loss or gain in last 6 months  Depression Screening:   PHQ-2 Score: 0      Fall Risk Screening: In the past year, patient has experienced: no history of falling in past year      Home Safety:  Patient does not have trouble with stairs inside or outside of their home  Patient has working smoke alarms and has working carbon monoxide detector  Home safety hazards include: none  Nutrition:   Current diet is Diabetic, Low Carb and No Added Salt  Medications:   Patient is not currently taking any over-the-counter supplements   Patient is able to manage medications  Activities of Daily Living (ADLs)/Instrumental Activities of Daily Living (IADLs):   Walk and transfer into and out of bed and chair?: Yes  Dress and groom yourself?: Yes    Bathe or shower yourself?: Yes    Feed yourself? Yes  Do your laundry/housekeeping?: Yes  Manage your money, pay your bills and track your expenses?: Yes  Make your own meals?: Yes    Do your own shopping?: Yes    Previous Hospitalizations:   Any hospitalizations or ED visits within the last 12 months?: No      Advance Care Planning:   Living will: No    Durable POA for healthcare: Yes    Advanced directive: No    Advanced directive counseling given: Yes    End of Life Decisions reviewed with patient: Yes    Provider agrees with end of life decisions: Yes      Cognitive Screening:   Provider or family/friend/caregiver concerned regarding cognition?: No    PREVENTIVE SCREENINGS      Cardiovascular Screening:    General: Screening Not Indicated and History Lipid Disorder      Diabetes Screening:     General: Screening Not Indicated and History Diabetes      Colorectal Cancer Screening:     General: Screening Current      Prostate Cancer Screening:    General: Screening Current      Osteoporosis Screening:    General: Screening Not Indicated      Abdominal Aortic Aneurysm (AAA) Screening:    Risk factors include: age between 73-67 yo        Lung Cancer Screening:     General: Screening Not Indicated      Hepatitis C Screening:    General: Screening Current    Screening, Brief Intervention, and Referral to Treatment (SBIRT)    Screening  Typical number of drinks in a day: 0  Typical number of drinks in a week: 0  Interpretation: Low risk drinking behavior  AUDIT-C Screenin) How often did you have a drink containing alcohol in the past year? never  2) How many drinks did you have on a typical day when you were drinking in the past year? 0  3) How often did you have 6 or more drinks on one occasion in the past year? never    AUDIT-C Score: 0  Interpretation: Score 0-3 (male): Negative screen for alcohol misuse    Single Item Drug Screening:  How often have you used an illegal drug (including marijuana) or a prescription medication for non-medical reasons in the past year? never    Single Item Drug Screen Score: 0  Interpretation: Negative screen for possible drug use disorder    Brief Intervention  Alcohol & drug use screenings were reviewed  No concerns regarding substance use disorder identified  No exam data present     Physical Exam:     /70   Pulse (!) 52   Temp (!) 97 2 °F (36 2 °C)   Resp 18   Ht 5' 10" (1 778 m)   Wt 85 7 kg (189 lb)   SpO2 98%   BMI 27 12 kg/m²     Physical Exam  Vitals reviewed  Constitutional:       Appearance: He is well-developed  HENT:      Head: Normocephalic and atraumatic  Right Ear: External ear normal       Left Ear: External ear normal    Cardiovascular:      Rate and Rhythm: Normal rate and regular rhythm  Heart sounds: Normal heart sounds  No murmur heard  Pulmonary:      Effort: Pulmonary effort is normal  No respiratory distress  Breath sounds: Normal breath sounds  No wheezing  Musculoskeletal:         General: Normal range of motion  Skin:     General: Skin is warm and dry  Neurological:      Mental Status: He is alert and oriented to person, place, and time             Celena Mtz DO

## 2022-09-01 NOTE — PATIENT INSTRUCTIONS
Medicare Preventive Visit Patient Instructions  Thank you for completing your Welcome to Medicare Visit or Medicare Annual Wellness Visit today  Your next wellness visit will be due in one year (9/2/2023)  The screening/preventive services that you may require over the next 5-10 years are detailed below  Some tests may not apply to you based off risk factors and/or age  Screening tests ordered at today's visit but not completed yet may show as past due  Also, please note that scanned in results may not display below  Preventive Screenings:  Service Recommendations Previous Testing/Comments   Colorectal Cancer Screening  · Colonoscopy    · Fecal Occult Blood Test (FOBT)/Fecal Immunochemical Test (FIT)  · Fecal DNA/Cologuard Test  · Flexible Sigmoidoscopy Age: 39-70 years old   Colonoscopy: every 10 years (May be performed more frequently if at higher risk)  OR  FOBT/FIT: every 1 year  OR  Cologuard: every 3 years  OR  Sigmoidoscopy: every 5 years  Screening may be recommended earlier than age 39 if at higher risk for colorectal cancer  Also, an individualized decision between you and your healthcare provider will decide whether screening between the ages of 74-80 would be appropriate   Colonoscopy: 04/27/2021  FOBT/FIT: Not on file  Cologuard: Not on file  Sigmoidoscopy: Not on file    Screening Current     Prostate Cancer Screening Individualized decision between patient and health care provider in men between ages of 53-78   Medicare will cover every 12 months beginning on the day after your 50th birthday PSA: 1 5 ng/mL     Screening Current     Hepatitis C Screening Once for adults born between 1945 and 1965  More frequently in patients at high risk for Hepatitis C Hep C Antibody: 06/22/2017    Screening Current   Diabetes Screening 1-2 times per year if you're at risk for diabetes or have pre-diabetes Fasting glucose: No results in last 5 years (No results in last 5 years)  A1C: 6 4 % (8/11/2022)  Screening Not Indicated  History Diabetes   Cholesterol Screening Once every 5 years if you don't have a lipid disorder  May order more often based on risk factors  Lipid panel: 08/11/2022  Screening Not Indicated  History Lipid Disorder      Other Preventive Screenings Covered by Medicare:  1  Abdominal Aortic Aneurysm (AAA) Screening: covered once if your at risk  You're considered to be at risk if you have a family history of AAA or a male between the age of 73-68 who smoking at least 100 cigarettes in your lifetime  2  Lung Cancer Screening: covers low dose CT scan once per year if you meet all of the following conditions: (1) Age 50-69; (2) No signs or symptoms of lung cancer; (3) Current smoker or have quit smoking within the last 15 years; (4) You have a tobacco smoking history of at least 20 pack years (packs per day x number of years you smoked); (5) You get a written order from a healthcare provider  3  Glaucoma Screening: covered annually if you're considered high risk: (1) You have diabetes OR (2) Family history of glaucoma OR (3)  aged 48 and older OR (3)  American aged 72 and older  3  Osteoporosis Screening: covered every 2 years if you meet one of the following conditions: (1) Have a vertebral abnormality; (2) On glucocorticoid therapy for more than 3 months; (3) Have primary hyperparathyroidism; (4) On osteoporosis medications and need to assess response to drug therapy  5  HIV Screening: covered annually if you're between the age of 12-76  Also covered annually if you are younger than 13 and older than 72 with risk factors for HIV infection  For pregnant patients, it is covered up to 3 times per pregnancy      Immunizations:  Immunization Recommendations   Influenza Vaccine Annual influenza vaccination during flu season is recommended for all persons aged >= 6 months who do not have contraindications   Pneumococcal Vaccine   * Pneumococcal conjugate vaccine = PCV13 (Prevnar 13), PCV15 (Vaxneuvance), PCV20 (Prevnar 20)  * Pneumococcal polysaccharide vaccine = PPSV23 (Pneumovax) Adults 2364 years old: 1-3 doses may be recommended based on certain risk factors  Adults 72 years old: 1-2 doses may be recommended based off what pneumonia vaccine you previously received   Hepatitis B Vaccine 3 dose series if at intermediate or high risk (ex: diabetes, end stage renal disease, liver disease)   Tetanus (Td) Vaccine - COST NOT COVERED BY MEDICARE PART B Following completion of primary series, a booster dose should be given every 10 years to maintain immunity against tetanus  Td may also be given as tetanus wound prophylaxis  Tdap Vaccine - COST NOT COVERED BY MEDICARE PART B Recommended at least once for all adults  For pregnant patients, recommended with each pregnancy  Shingles Vaccine (Shingrix) - COST NOT COVERED BY MEDICARE PART B  2 shot series recommended in those aged 48 and above     Health Maintenance Due:      Topic Date Due    Colorectal Cancer Screening  04/27/2026    Hepatitis C Screening  Completed     Immunizations Due:      Topic Date Due    Influenza Vaccine (1) 09/01/2022     Advance Directives   What are advance directives? Advance directives are legal documents that state your wishes and plans for medical care  These plans are made ahead of time in case you lose your ability to make decisions for yourself  Advance directives can apply to any medical decision, such as the treatments you want, and if you want to donate organs  What are the types of advance directives? There are many types of advance directives, and each state has rules about how to use them  You may choose a combination of any of the following:  · Living will: This is a written record of the treatment you want  You can also choose which treatments you do not want, which to limit, and which to stop at a certain time  This includes surgery, medicine, IV fluid, and tube feedings     · Durable power of  for [facility]): This is a written record that states who you want to make healthcare choices for you when you are unable to make them for yourself  This person, called a proxy, is usually a family member or a friend  You may choose more than 1 proxy  · Do not resuscitate (DNR) order:  A DNR order is used in case your heart stops beating or you stop breathing  It is a request not to have certain forms of treatment, such as CPR  A DNR order may be included in other types of advance directives  · Medical directive: This covers the care that you want if you are in a coma, near death, or unable to make decisions for yourself  You can list the treatments you want for each condition  Treatment may include pain medicine, surgery, blood transfusions, dialysis, IV or tube feedings, and a ventilator (breathing machine)  · Values history: This document has questions about your views, beliefs, and how you feel and think about life  This information can help others choose the care that you would choose  Why are advance directives important? An advance directive helps you control your care  Although spoken wishes may be used, it is better to have your wishes written down  Spoken wishes can be misunderstood, or not followed  Treatments may be given even if you do not want them  An advance directive may make it easier for your family to make difficult choices about your care  Weight Management   Why it is important to manage your weight:  Being overweight increases your risk of health conditions such as heart disease, high blood pressure, type 2 diabetes, and certain types of cancer  It can also increase your risk for osteoarthritis, sleep apnea, and other respiratory problems  Aim for a slow, steady weight loss  Even a small amount of weight loss can lower your risk of health problems  How to lose weight safely:  A safe and healthy way to lose weight is to eat fewer calories and get regular exercise   You can lose up about 1 pound a week by decreasing the number of calories you eat by 500 calories each day  Healthy meal plan for weight management:  A healthy meal plan includes a variety of foods, contains fewer calories, and helps you stay healthy  A healthy meal plan includes the following:  · Eat whole-grain foods more often  A healthy meal plan should contain fiber  Fiber is the part of grains, fruits, and vegetables that is not broken down by your body  Whole-grain foods are healthy and provide extra fiber in your diet  Some examples of whole-grain foods are whole-wheat breads and pastas, oatmeal, brown rice, and bulgur  · Eat a variety of vegetables every day  Include dark, leafy greens such as spinach, kale, fallon greens, and mustard greens  Eat yellow and orange vegetables such as carrots, sweet potatoes, and winter squash  · Eat a variety of fruits every day  Choose fresh or canned fruit (canned in its own juice or light syrup) instead of juice  Fruit juice has very little or no fiber  · Eat low-fat dairy foods  Drink fat-free (skim) milk or 1% milk  Eat fat-free yogurt and low-fat cottage cheese  Try low-fat cheeses such as mozzarella and other reduced-fat cheeses  · Choose meat and other protein foods that are low in fat  Choose beans or other legumes such as split peas or lentils  Choose fish, skinless poultry (chicken or turkey), or lean cuts of red meat (beef or pork)  Before you cook meat or poultry, cut off any visible fat  · Use less fat and oil  Try baking foods instead of frying them  Add less fat, such as margarine, sour cream, regular salad dressing and mayonnaise to foods  Eat fewer high-fat foods  Some examples of high-fat foods include french fries, doughnuts, ice cream, and cakes  · Eat fewer sweets  Limit foods and drinks that are high in sugar  This includes candy, cookies, regular soda, and sweetened drinks    Exercise:  Exercise at least 30 minutes per day on most days of the week  Some examples of exercise include walking, biking, dancing, and swimming  You can also fit in more physical activity by taking the stairs instead of the elevator or parking farther away from stores  Ask your healthcare provider about the best exercise plan for you  © Copyright [employer] 2018 Information is for End User's use only and may not be sold, redistributed or otherwise used for commercial purposes   All illustrations and images included in CareNotes® are the copyrighted property of A D A M , [employer]  or [address] [place] [address] [employer]

## 2022-09-17 ENCOUNTER — TELEPHONE (OUTPATIENT)
Dept: FAMILY MEDICINE CLINIC | Facility: CLINIC | Age: 72
End: 2022-09-17

## 2022-09-17 NOTE — TELEPHONE ENCOUNTER
Patient dropped of FIVE WISHES packet  He would like Dr Rufus Mariano to look it over before we scan into chart  I made a copy and put in Dr Kelsy Doyle folder

## 2022-10-18 ENCOUNTER — OFFICE VISIT (OUTPATIENT)
Dept: CARDIOLOGY CLINIC | Facility: CLINIC | Age: 72
End: 2022-10-18
Payer: MEDICARE

## 2022-10-18 VITALS
OXYGEN SATURATION: 98 % | SYSTOLIC BLOOD PRESSURE: 138 MMHG | TEMPERATURE: 97.8 F | HEIGHT: 70 IN | DIASTOLIC BLOOD PRESSURE: 80 MMHG | HEART RATE: 59 BPM | BODY MASS INDEX: 26.77 KG/M2 | WEIGHT: 187 LBS

## 2022-10-18 DIAGNOSIS — I21.9 MYOCARDIAL INFARCTION, UNSPECIFIED MI TYPE, UNSPECIFIED ARTERY (HCC): Primary | ICD-10-CM

## 2022-10-18 DIAGNOSIS — I70.209 ATHEROSCLEROSIS OF ARTERIES OF EXTREMITIES (HCC): ICD-10-CM

## 2022-10-18 DIAGNOSIS — I10 ESSENTIAL HYPERTENSION: ICD-10-CM

## 2022-10-18 DIAGNOSIS — I11.0 HYPERTENSIVE HEART DISEASE WITH HEART FAILURE (HCC): ICD-10-CM

## 2022-10-18 DIAGNOSIS — I50.9 CONGESTIVE HEART FAILURE, UNSPECIFIED HF CHRONICITY, UNSPECIFIED HEART FAILURE TYPE (HCC): ICD-10-CM

## 2022-10-18 DIAGNOSIS — I25.10 CORONARY ARTERY DISEASE INVOLVING NATIVE CORONARY ARTERY OF NATIVE HEART WITHOUT ANGINA PECTORIS: ICD-10-CM

## 2022-10-18 PROCEDURE — 99214 OFFICE O/P EST MOD 30 MIN: CPT | Performed by: INTERNAL MEDICINE

## 2022-10-18 PROCEDURE — 93000 ELECTROCARDIOGRAM COMPLETE: CPT | Performed by: INTERNAL MEDICINE

## 2022-10-18 NOTE — PROGRESS NOTES
Cardiology Followup     Sydney Fernández  [id number]  1950  [facility] PROFESSIONAL [facility]  [address] [address] [id number]    Consult for: CAD    Interval History: Sydney Fernández is a 71y o  year old male  who is here for followup of CAD  Since his last visit, he has been feeling well   he denies any palpitations, chest pain, shortness of breath, LE edema, orthopnea or PND  He has lost 20 pounds since his last visit with diet modification  He does not exercise regularly but is very active  Most recent blood work showed LDL of 55 mg/dL which is improved from previous levels  His A1c also improved to 6 4%  Past Cardiac History: In February 2012, he had a myocardial infarction where he felt a band like pain across the front of his chest   No associated dyspnea  He underwent PCI *2 to mid LAD with Xience 3 5mm * 12mm  and 4 0 mm * 12mm stents           Past Medical History:   Diagnosis Date   • Acquired ankle/foot deformity     last assessed: 05/30/2017   • Acute myocardial infarction [facility])     last assessed: 12/03/2013   • Anemia     last assessed: 12/03/2013   • Anxiety     last assessed: 12/03/2013   • Cardiomyopathy ([facility] )     last assessed: 12/03/2013   • Congestive heart failure (CHF) ([facility] )     onset: 02/29/2012   • Coronary artery disease    • Diabetes mellitus ([facility] )    • Dysesthesia     last assessed: 09/10/2014   • Exposure to hepatitis     last assessed: 11/15/2016   • Foot slap     last assessed: 03/17/2016   • Hypertension    • Myocardial infarction [facility])    • Palsy of left sciatic nerve     last assessed: 03/17/2016   • Subconjunctival hemorrhage     last assessed: 03/03/2014   • Xerosis cutis     last assessed: 10/25/2016     Social History     Socioeconomic History   • Marital status:      Spouse name: Not on file   • Number of children: Not on file   • Years of education: Not on file   • Highest education level: Not on file   Occupational History   • Not on file   Tobacco Use   • Smoking status: Never Smoker   • Smokeless tobacco: Never Used   Vaping Use   • Vaping Use: Never used   Substance and Sexual Activity   • Alcohol use: No   • Drug use: No   • Sexual activity: Not on file   Other Topics Concern   • Not on file   Social History Narrative   • Not on file     Social Determinants of Health     Financial Resource Strain: Low Risk    • Difficulty of Paying Living Expenses: Not hard at all   Food Insecurity: Not on file   Transportation Needs: No Transportation Needs   • Lack of Transportation (Medical): No   • Lack of Transportation (Non-Medical): No   Physical Activity: Not on file   Stress: Not on file   Social Connections: Not on file   Intimate Partner Violence: Not on file   Housing Stability: Not on file      Family History   Problem Relation Age of Onset   • Heart disease Mother         cardiac disorder   • Leukemia Father    • Ovarian cancer Sister      Past Surgical History:   Procedure Laterality Date   • ANGIOPLASTY      2 coronary stents   • CHOLECYSTECTOMY     • COLONOSCOPY N/A 3/17/2016    Procedure: COLONOSCOPY;  Surgeon: Nancy Saunders MD;  Location: [facility] GI LAB;   Service:    • HERNIA REPAIR      right inguinal        Current Outpatient Medications:   •  aspirin (ECOTRIN LOW STRENGTH) 81 mg EC tablet, Take 1 tablet (81 mg total) by mouth daily, Disp: 30 tablet, Rfl: 5  •  atorvastatin (LIPITOR) 40 mg tablet, TAKE ONE TABLET BY MOUTH EVERY DAY, Disp: 90 tablet, Rfl: 1  •  clopidogrel (PLAVIX) 75 mg tablet, Take 1 tablet (75 mg total) by mouth daily, Disp: 90 tablet, Rfl: 1  •  gabapentin (NEURONTIN) 100 mg capsule, TAKE ONE CAPSULE BY MOUTH TWICE A DAY, Disp: 180 capsule, Rfl: 1  •  gabapentin (NEURONTIN) 400 mg capsule, TAKE ONE CAPSULE BY MOUTH FOUR TIMES A DAY, Disp: 360 capsule, Rfl: 1  •  glucose blood (Contour Next Test) test strip, Use 1 each daily Test blood glucose, Disp: 100 each, Rfl: 3  •  metFORMIN (GLUCOPHAGE) 500 mg tablet, Take 1 tablet (500 mg total) by mouth daily with breakfast, Disp: 90 tablet, Rfl: 3  •  metoprolol succinate (TOPROL-XL) 25 mg 24 hr tablet, Take 0 5 tablets (12 5 mg total) by mouth daily at bedtime, Disp: 90 tablet, Rfl: 1  •  Microlet Lancets MISC, Use daily, Disp: 100 each, Rfl: 3  No Known Allergies      Review of Systems:  Review of Systems   Respiratory: Negative for shortness of breath  Cardiovascular: Negative for chest pain, palpitations and leg swelling  All other systems reviewed and are negative  Physical Exam:  Vitals:    10/18/22 0746   BP: 138/80   BP Location: Right arm   Patient Position: Sitting   Cuff Size: Large   Pulse: 59   Temp: 97 8 °F (36 6 °C)   SpO2: 98%   Weight: 84 8 kg (187 lb)   Height: 5' 10" (1 778 m)     Physical Exam  Constitutional:       General: He is not in acute distress  Appearance: He is well-developed  He is not diaphoretic  HENT:      Head: Normocephalic and atraumatic  Eyes:      Conjunctiva/sclera: Conjunctivae normal       Pupils: Pupils are equal, round, and reactive to light  Neck:      Thyroid: No thyromegaly  Vascular: No JVD  Cardiovascular:      Rate and Rhythm: Normal rate and regular rhythm  Heart sounds: Murmur heard  No friction rub  No gallop  Pulmonary:      Effort: Pulmonary effort is normal       Breath sounds: Normal breath sounds  Musculoskeletal:      Cervical back: Neck supple  Skin:     General: Skin is warm and dry  Findings: No erythema or rash  Neurological:      General: No focal deficit present  Mental Status: He is alert and oriented to person, place, and time  Cranial Nerves: No cranial nerve deficit  Psychiatric:         Mood and Affect: Mood normal          Behavior: Behavior normal          Thought Content:  Thought content normal          Judgment: Judgment normal          Labs:  Lab Results   Component Value Date     12/16/2017 K 4 7 08/11/2022     08/11/2022    CO2 26 08/11/2022    BUN 16 08/11/2022    CREATININE 0 87 08/11/2022    CREATININE 0 97 12/16/2017    GLUCOSE 93 12/16/2017    CALCIUM 9 7 12/16/2017     Lab Results   Component Value Date    WBC 8 2 08/11/2022    WBC 8 3 12/09/2016    HGB 16 0 08/11/2022    HGB 16 0 12/09/2016    HCT 48 1 08/11/2022    HCT 48 4 12/09/2016    MCV 87 08/11/2022    MCV 89 12/09/2016     08/11/2022     12/09/2016     Lab Results   Component Value Date    CHOL 153 12/16/2017    TRIG 69 08/11/2022    HDL 46 08/11/2022    LDLDIRECT 80 10/14/2014     Imaging: No results found  EKG (independently reviewed):  Sinus bradycardia at 55 beats per minute with nonspecific T-wave abnormalities    Discussion/Summary:  1  Coronary artery disease involving native coronary artery of native heart without angina pectoris  -  Last stress test was normal without any ischemia  Good functional capacity and normal BP response  - patient has been doing well with diet modification and has lost 20 lb  Encouraged to begin regular exercise for further risk reduction  2  Essential hypertension  - Continue benazepril   - No proteinuria on last urine study  3  Diabetic polyneuropathy associated with type 2 diabetes mellitus ([address] )  - Continue metformin    4  Mixed hyperlipidemia  - Last LDL was 55 mg/dL  He is taking atorvastatin 40 mg daily     Given history of CAD,  LDL goal should be less than 70      5  History of myocardial infarction in adulthood  - patient wishes to remain on both aspirin and Plavix

## 2022-10-30 DIAGNOSIS — I10 ESSENTIAL HYPERTENSION: ICD-10-CM

## 2022-10-30 DIAGNOSIS — G60.9 IDIOPATHIC PERIPHERAL NEUROPATHY: ICD-10-CM

## 2022-10-31 RX ORDER — METOPROLOL SUCCINATE 25 MG/1
TABLET, EXTENDED RELEASE ORAL
Qty: 90 TABLET | Refills: 1 | Status: SHIPPED | OUTPATIENT
Start: 2022-10-31

## 2022-10-31 RX ORDER — GABAPENTIN 100 MG/1
CAPSULE ORAL
Qty: 180 CAPSULE | Refills: 1 | Status: SHIPPED | OUTPATIENT
Start: 2022-10-31

## 2022-11-07 ENCOUNTER — TELEPHONE (OUTPATIENT)
Dept: FAMILY MEDICINE CLINIC | Facility: CLINIC | Age: 72
End: 2022-11-07

## 2022-11-07 DIAGNOSIS — U07.1 COVID-19: Primary | ICD-10-CM

## 2022-11-07 RX ORDER — NIRMATRELVIR AND RITONAVIR 300-100 MG
3 KIT ORAL 2 TIMES DAILY
Qty: 30 TABLET | Refills: 0 | Status: SHIPPED | OUTPATIENT
Start: 2022-11-07 | End: 2022-11-12

## 2022-11-07 NOTE — TELEPHONE ENCOUNTER
----- Message from Baldo Imam sent at 11/6/2022  2:49 PM EST -----  Regarding: Flu Shot  Hi Dr Hilda Cisneros, I hope you’re doing well……  Im texting to let you know, yesterday Saturday, I started with phlegm in my throat, today Sunday, I started sneezing and with a runny nose  I took a home Covid test this morning, Sunday  It read positive !!! To me it is an Old Day head cold  Now Sunday afternoon  2:35pm  the sneezing and runny nose is much, much less  I took Mucinex this morning  …… I was wondering, could you prescribe that Covid pill  If you think it would be good for me and if it would help……  I have taken the 2 beginning Covid vaccines and the 3 boosters   Thank You Doctor !!!!! Please  let me know

## 2022-11-07 NOTE — TELEPHONE ENCOUNTER
11/7/2022 8:10 AM Called Virginia Ashton  He has been taking Mucinex for the past couple of days  His symptoms started on 11/4/22  He tested positive on 11/6/22  He has nasal congestion and a fever  He doesn't have any shortness of breath  Emergency use authorization medication discussed  Side effects of Paxlovid discussed    If has worsening symptoms advised to contact the office  I let him know we would be happy to check him out in the office if needed      Lamar Leon DO

## 2022-11-28 DIAGNOSIS — E11.42 DIABETIC POLYNEUROPATHY ASSOCIATED WITH TYPE 2 DIABETES MELLITUS (HCC): ICD-10-CM

## 2022-11-28 PROBLEM — N52.9 IMPOTENCE: Status: RESOLVED | Noted: 2017-07-13 | Resolved: 2022-11-28

## 2022-11-30 LAB
ALBUMIN SERPL-MCNC: 4.7 G/DL (ref 3.7–4.7)
ALBUMIN/GLOB SERPL: 2 {RATIO} (ref 1.2–2.2)
ALP SERPL-CCNC: 73 IU/L (ref 44–121)
ALT SERPL-CCNC: 27 IU/L (ref 0–44)
AST SERPL-CCNC: 16 IU/L (ref 0–40)
BILIRUB SERPL-MCNC: 0.8 MG/DL (ref 0–1.2)
BUN SERPL-MCNC: 12 MG/DL (ref 8–27)
BUN/CREAT SERPL: 14 (ref 10–24)
CALCIUM SERPL-MCNC: 9.8 MG/DL (ref 8.6–10.2)
CHLORIDE SERPL-SCNC: 101 MMOL/L (ref 96–106)
CHOLEST SERPL-MCNC: 121 MG/DL (ref 100–199)
CO2 SERPL-SCNC: 26 MMOL/L (ref 20–29)
CREAT SERPL-MCNC: 0.85 MG/DL (ref 0.76–1.27)
EGFR: 93 ML/MIN/1.73
ERYTHROCYTE [DISTWIDTH] IN BLOOD BY AUTOMATED COUNT: 13.1 % (ref 11.6–15.4)
EST. AVERAGE GLUCOSE BLD GHB EST-MCNC: 131 MG/DL
GLOBULIN SER-MCNC: 2.3 G/DL (ref 1.5–4.5)
GLUCOSE SERPL-MCNC: 105 MG/DL (ref 70–99)
HBA1C MFR BLD: 6.2 % (ref 4.8–5.6)
HCT VFR BLD AUTO: 45.4 % (ref 37.5–51)
HDLC SERPL-MCNC: 54 MG/DL
HGB BLD-MCNC: 15.8 G/DL (ref 13–17.7)
LDLC SERPL CALC-MCNC: 55 MG/DL (ref 0–99)
LDLC/HDLC SERPL: 1 RATIO (ref 0–3.6)
MCH RBC QN AUTO: 29.6 PG (ref 26.6–33)
MCHC RBC AUTO-ENTMCNC: 34.8 G/DL (ref 31.5–35.7)
MCV RBC AUTO: 85 FL (ref 79–97)
MICRODELETION SYND BLD/T FISH: NORMAL
PLATELET # BLD AUTO: 268 X10E3/UL (ref 150–450)
POTASSIUM SERPL-SCNC: 4.9 MMOL/L (ref 3.5–5.2)
PROT SERPL-MCNC: 7 G/DL (ref 6–8.5)
RBC # BLD AUTO: 5.34 X10E6/UL (ref 4.14–5.8)
SL AMB VLDL CHOLESTEROL CALC: 12 MG/DL (ref 5–40)
SODIUM SERPL-SCNC: 142 MMOL/L (ref 134–144)
TRIGL SERPL-MCNC: 53 MG/DL (ref 0–149)
WBC # BLD AUTO: 9 X10E3/UL (ref 3.4–10.8)

## 2022-12-02 ENCOUNTER — OFFICE VISIT (OUTPATIENT)
Dept: FAMILY MEDICINE CLINIC | Facility: CLINIC | Age: 72
End: 2022-12-02

## 2022-12-02 VITALS
HEART RATE: 52 BPM | DIASTOLIC BLOOD PRESSURE: 70 MMHG | RESPIRATION RATE: 20 BRPM | HEIGHT: 70 IN | SYSTOLIC BLOOD PRESSURE: 118 MMHG | BODY MASS INDEX: 26.63 KG/M2 | WEIGHT: 186 LBS | TEMPERATURE: 95 F

## 2022-12-02 DIAGNOSIS — E11.36 TYPE 2 DIABETES MELLITUS WITH DIABETIC CATARACT, WITHOUT LONG-TERM CURRENT USE OF INSULIN (HCC): Primary | ICD-10-CM

## 2022-12-02 DIAGNOSIS — U07.1 COVID-19: ICD-10-CM

## 2022-12-02 DIAGNOSIS — Z12.5 PROSTATE CANCER SCREENING: ICD-10-CM

## 2022-12-02 DIAGNOSIS — I10 ESSENTIAL HYPERTENSION: ICD-10-CM

## 2022-12-02 DIAGNOSIS — E78.2 MIXED HYPERLIPIDEMIA: ICD-10-CM

## 2022-12-02 NOTE — PROGRESS NOTES
Name: Dragan Begum      : 1950      MRN: [id number]  Encounter Provider: Kj Whaley DO  Encounter Date: 2022   Encounter department: [address] [place] [address]     1  Type 2 diabetes mellitus with diabetic cataract, without long-term current use of insulin (HCC)  Assessment & Plan:    Lab Results   Component Value Date    HGBA1C 6 2 (H) 2022   Improving  Continue metformin    Orders:  -     Hemoglobin A1C; Future; Expected date: 02/15/2023  -     Hemoglobin A1C    2  Mixed hyperlipidemia  Assessment & Plan:  Stable  Continue atorvastatin 40 mg a day    Orders:  -     Comprehensive metabolic panel; Future; Expected date: 02/15/2023  -     Lipid Panel with Direct LDL reflex; Future; Expected date: 02/15/2023  -     Comprehensive metabolic panel  -     Lipid Panel with Direct LDL reflex    3  Essential hypertension  Assessment & Plan:  Well controlled  Continue metoprolol 12 5 mg daily     Orders:  -     CBC; Future; Expected date: 02/15/2023  -     Comprehensive metabolic panel; Future; Expected date: 02/15/2023  -     Lipid Panel with Direct LDL reflex; Future; Expected date: 02/15/2023  -     CBC  -     Comprehensive metabolic panel  -     Lipid Panel with Direct LDL reflex    4  Prostate cancer screening  -     PSA Total (Reflex To Free); Future; Expected date: 02/15/2023  -     PSA Total (Reflex To Free)    5  COVID-19  Comments:  He is now 3 weeks into his illness  Has residual symptoms        Return in about 3 months (around 3/2/2023) for Diabetic follow up  Subjective      Patient reports that he is still tired from having [place]  His recent COVID test at home have been negative  He is not been eating as much  He has been careful to be staying at home  Review of Systems   Constitutional: Positive for fatigue  Negative for fever  Respiratory: Positive for cough  Negative for shortness of breath  Neurological: Positive for headaches         Current Outpatient Medications on File Prior to Visit   Medication Sig   • aspirin (ECOTRIN LOW STRENGTH) 81 mg EC tablet Take 1 tablet (81 mg total) by mouth daily   • atorvastatin (LIPITOR) 40 mg tablet TAKE ONE TABLET BY MOUTH EVERY DAY   • clopidogrel (PLAVIX) 75 mg tablet Take 1 tablet (75 mg total) by mouth daily   • gabapentin (NEURONTIN) 100 mg capsule TAKE ONE CAPSULE BY MOUTH TWICE A DAY   • gabapentin (NEURONTIN) 400 mg capsule TAKE ONE CAPSULE BY MOUTH FOUR TIMES A DAY   • glucose blood (Contour Next Test) test strip Use 1 each daily Test blood glucose   • metFORMIN (GLUCOPHAGE) 500 mg tablet TAKE ONE TABLET BY MOUTH EVERY DAY WITH BREAKFAST (GENERIC FOR GLUCOPHAGE)   • metoprolol succinate (TOPROL-XL) 25 mg 24 hr tablet TAKE ONE TABLET BY MOUTH EVERY DAY AT BEDTIME (Patient taking differently: 1/2 tablet at HS)   • Microlet Lancets MISC Use daily       Objective     /70   Pulse (!) 52   Temp (!) 95 °F (35 °C)   Resp 20   Ht 5' 10" (1 778 m)   Wt 84 4 kg (186 lb)   BMI 26 69 kg/m²     Physical Exam  Vitals and nursing note reviewed  Constitutional:       Appearance: He is well-developed  HENT:      Head: Normocephalic and atraumatic  Right Ear: Tympanic membrane and external ear normal       Left Ear: Tympanic membrane and external ear normal    Cardiovascular:      Rate and Rhythm: Normal rate and regular rhythm  Heart sounds: Normal heart sounds  No murmur heard  Pulmonary:      Effort: Pulmonary effort is normal  No respiratory distress  Breath sounds: Normal breath sounds  No wheezing or rales  Musculoskeletal:      Right lower leg: No edema  Left lower leg: No edema         Stanislav Bell DO

## 2022-12-05 ENCOUNTER — TELEPHONE (OUTPATIENT)
Dept: FAMILY MEDICINE CLINIC | Facility: CLINIC | Age: 72
End: 2022-12-05

## 2022-12-05 NOTE — TELEPHONE ENCOUNTER
Pt dropped off copy of his second COVID booster he had done at [facility] put in nurse pending one, please enter

## 2023-01-17 LAB
LEFT EYE DIABETIC RETINOPATHY: NORMAL
RIGHT EYE DIABETIC RETINOPATHY: NORMAL

## 2023-01-18 ENCOUNTER — TELEPHONE (OUTPATIENT)
Dept: ADMINISTRATIVE | Facility: OTHER | Age: 73
End: 2023-01-18

## 2023-01-18 NOTE — TELEPHONE ENCOUNTER
----- Message from Sarah Hill DO sent at 1/18/2023  8:25 AM EST -----  01/18/23 8:25 AM    Hello, our patient Sonali Moran has had Diabetic Eye Exam completed/performed  Please assist in updating the patient chart by pulling the document from the Media Tab  The date of service is January 2023       Thank you,  Sarah Hill DO  Counts include 234 beds at the [facility]

## 2023-01-19 NOTE — TELEPHONE ENCOUNTER
Upon review of the In Basket request we were able to locate, review, and update the patient chart as requested for Diabetic Eye Exam     Any additional questions or concerns should be emailed to the Practice Liaisons via the appropriate education email address, please do not reply via In Basket      Thank you  Rolinda Spatz, MA

## 2023-01-31 DIAGNOSIS — G60.9 IDIOPATHIC PERIPHERAL NEUROPATHY: ICD-10-CM

## 2023-01-31 DIAGNOSIS — E78.2 MIXED HYPERLIPIDEMIA: ICD-10-CM

## 2023-01-31 DIAGNOSIS — I25.10 CORONARY ARTERY DISEASE INVOLVING NATIVE CORONARY ARTERY OF NATIVE HEART WITHOUT ANGINA PECTORIS: ICD-10-CM

## 2023-01-31 RX ORDER — ATORVASTATIN CALCIUM 40 MG/1
TABLET, FILM COATED ORAL
Qty: 90 TABLET | Refills: 1 | Status: SHIPPED | OUTPATIENT
Start: 2023-01-31

## 2023-01-31 RX ORDER — GABAPENTIN 400 MG/1
CAPSULE ORAL
Qty: 360 CAPSULE | Refills: 1 | Status: SHIPPED | OUTPATIENT
Start: 2023-01-31

## 2023-02-23 ENCOUNTER — RA CDI HCC (OUTPATIENT)
Dept: OTHER | Facility: HOSPITAL | Age: 73
End: 2023-02-23

## 2023-02-25 DIAGNOSIS — I21.9 MYOCARDIAL INFARCTION, UNSPECIFIED MI TYPE, UNSPECIFIED ARTERY (HCC): ICD-10-CM

## 2023-02-27 RX ORDER — CLOPIDOGREL BISULFATE 75 MG/1
TABLET ORAL
Qty: 90 TABLET | Refills: 1 | Status: SHIPPED | OUTPATIENT
Start: 2023-02-27

## 2023-02-28 LAB
ALBUMIN SERPL-MCNC: 4.5 G/DL (ref 3.7–4.7)
ALBUMIN/GLOB SERPL: 1.9 {RATIO} (ref 1.2–2.2)
ALP SERPL-CCNC: 74 IU/L (ref 44–121)
ALT SERPL-CCNC: 20 IU/L (ref 0–44)
AST SERPL-CCNC: 17 IU/L (ref 0–40)
BILIRUB SERPL-MCNC: 1 MG/DL (ref 0–1.2)
BUN SERPL-MCNC: 19 MG/DL (ref 8–27)
BUN/CREAT SERPL: 22 (ref 10–24)
CALCIUM SERPL-MCNC: 9.8 MG/DL (ref 8.6–10.2)
CHLORIDE SERPL-SCNC: 102 MMOL/L (ref 96–106)
CHOLEST SERPL-MCNC: 148 MG/DL (ref 100–199)
CO2 SERPL-SCNC: 27 MMOL/L (ref 20–29)
CREAT SERPL-MCNC: 0.87 MG/DL (ref 0.76–1.27)
EGFR: 92 ML/MIN/1.73
ERYTHROCYTE [DISTWIDTH] IN BLOOD BY AUTOMATED COUNT: 13 % (ref 11.6–15.4)
EST. AVERAGE GLUCOSE BLD GHB EST-MCNC: 137 MG/DL
GLOBULIN SER-MCNC: 2.4 G/DL (ref 1.5–4.5)
GLUCOSE SERPL-MCNC: 98 MG/DL (ref 70–99)
HBA1C MFR BLD: 6.4 % (ref 4.8–5.6)
HCT VFR BLD AUTO: 48.3 % (ref 37.5–51)
HDLC SERPL-MCNC: 61 MG/DL
HGB BLD-MCNC: 16.1 G/DL (ref 13–17.7)
LDLC SERPL CALC-MCNC: 75 MG/DL (ref 0–99)
LDLC/HDLC SERPL: 1.2 RATIO (ref 0–3.6)
MCH RBC QN AUTO: 29.2 PG (ref 26.6–33)
MCHC RBC AUTO-ENTMCNC: 33.3 G/DL (ref 31.5–35.7)
MCV RBC AUTO: 88 FL (ref 79–97)
MICRODELETION SYND BLD/T FISH: NORMAL
PLATELET # BLD AUTO: 228 X10E3/UL (ref 150–450)
POTASSIUM SERPL-SCNC: 4.9 MMOL/L (ref 3.5–5.2)
PROT SERPL-MCNC: 6.9 G/DL (ref 6–8.5)
PSA SERPL-MCNC: 1.4 NG/ML (ref 0–4)
RBC # BLD AUTO: 5.52 X10E6/UL (ref 4.14–5.8)
SL AMB REFLEX CRITERIA: NORMAL
SL AMB VLDL CHOLESTEROL CALC: 12 MG/DL (ref 5–40)
SODIUM SERPL-SCNC: 141 MMOL/L (ref 134–144)
TRIGL SERPL-MCNC: 58 MG/DL (ref 0–149)
WBC # BLD AUTO: 7.2 X10E3/UL (ref 3.4–10.8)

## 2023-03-02 ENCOUNTER — OFFICE VISIT (OUTPATIENT)
Dept: FAMILY MEDICINE CLINIC | Facility: CLINIC | Age: 73
End: 2023-03-02

## 2023-03-02 VITALS
DIASTOLIC BLOOD PRESSURE: 74 MMHG | SYSTOLIC BLOOD PRESSURE: 124 MMHG | HEART RATE: 60 BPM | HEIGHT: 68 IN | BODY MASS INDEX: 28.92 KG/M2 | TEMPERATURE: 97 F | WEIGHT: 190.8 LBS | RESPIRATION RATE: 20 BRPM

## 2023-03-02 DIAGNOSIS — E11.36 TYPE 2 DIABETES MELLITUS WITH DIABETIC CATARACT, WITHOUT LONG-TERM CURRENT USE OF INSULIN (HCC): Primary | ICD-10-CM

## 2023-03-02 DIAGNOSIS — Z79.899 ENCOUNTER FOR LONG-TERM CURRENT USE OF MEDICATION: ICD-10-CM

## 2023-03-02 DIAGNOSIS — I10 ESSENTIAL HYPERTENSION: ICD-10-CM

## 2023-03-02 DIAGNOSIS — I70.209 ATHEROSCLEROSIS OF ARTERIES OF EXTREMITIES (HCC): ICD-10-CM

## 2023-03-02 DIAGNOSIS — U09.9 POST-ACUTE SEQUELAE OF COVID-19 (PASC): ICD-10-CM

## 2023-03-02 DIAGNOSIS — I11.0 HYPERTENSIVE HEART DISEASE WITH HEART FAILURE (HCC): ICD-10-CM

## 2023-03-02 NOTE — ASSESSMENT & PLAN NOTE
Wt Readings from Last 3 Encounters:   03/02/23 86 5 kg (190 lb 12 8 oz)   12/02/22 84 4 kg (186 lb)   10/18/22 84 8 kg (187 lb)       Stable  Following with cardiology

## 2023-03-02 NOTE — ASSESSMENT & PLAN NOTE
Lab Results   Component Value Date    HGBA1C 6 4 (H) 02/27/2023     Diabetes is well controlled  Continue metformin 500 mg daily

## 2023-03-02 NOTE — ASSESSMENT & PLAN NOTE
Continued fatigue since having COVID in November    Not having any shortness of breath  Zinc daily recommended

## 2023-03-02 NOTE — PROGRESS NOTES
Name: Brenden Figueroa      : 1950      MRN: [id number]  Encounter Provider: Nataly Rachel DO  Encounter Date: 3/2/2023   Encounter department: [facility]     1  Type 2 diabetes mellitus with diabetic cataract, without long-term current use of insulin ([facility])  Assessment & Plan:    Lab Results   Component Value Date    HGBA1C 6 4 (H) 2023     Diabetes is well controlled  Continue metformin 500 mg daily    Orders:  -     Hemoglobin A1C; Future; Expected date: 2023  -     Hemoglobin A1C    2  Hypertensive heart disease with heart failure [facility])  Assessment & Plan:  Wt Readings from Last 3 Encounters:   23 86 5 kg (190 lb 12 8 oz)   22 84 4 kg (186 lb)   10/18/22 84 8 kg (187 lb)       Stable  Following with cardiology      Orders:  -     CBC; Future; Expected date: 2023  -     Comprehensive metabolic panel; Future; Expected date: 2023  -     Lipid Panel with Direct LDL reflex; Future; Expected date: 2023  -     CBC  -     Comprehensive metabolic panel  -     Lipid Panel with Direct LDL reflex    3  Atherosclerosis of arteries of extremities (HCC)  Assessment & Plan:  Stable      4  Post-acute sequelae of COVID-19 ([facility])  Assessment & Plan:  Continued fatigue since having COVID in November  Not having any shortness of breath  Zinc daily recommended      5  Encounter for long-term current use of medication  -     Vitamin B12; Future; Expected date: 2023  -     Vitamin B12    6  Essential hypertension      BMI Counseling: Body mass index is 29 04 kg/m²  The BMI is above normal  Exercise recommendations include exercising 3-5 times per week  Rationale for BMI follow-up plan is due to patient being overweight or obese  Depression Screening and Follow-up Plan: Patient was screened for depression during today's encounter  They screened negative with a PHQ-2 score of 0       Return in about 3 months (around 2023) for Next scheduled follow up  Subjective      He is still feeling tired  From having COVID 19 in November  He has gone back to [employer]  Review of Systems   Constitutional: Positive for fatigue  Respiratory: Negative for shortness of breath  Cardiovascular: Negative for chest pain  Current Outpatient Medications on File Prior to Visit   Medication Sig   • aspirin (ECOTRIN LOW STRENGTH) 81 mg EC tablet Take 1 tablet (81 mg total) by mouth daily   • atorvastatin (LIPITOR) 40 mg tablet TAKE ONE TABLET BY MOUTH EVERY DAY   • clopidogrel (PLAVIX) 75 mg tablet TAKE ONE TABLET BY MOUTH EVERY DAY   • gabapentin (NEURONTIN) 100 mg capsule TAKE ONE CAPSULE BY MOUTH TWICE A DAY   • gabapentin (NEURONTIN) 400 mg capsule TAKE ONE CAPSULE BY MOUTH FOUR TIMES A DAY   • glucose blood (Contour Next Test) test strip Use 1 each daily Test blood glucose   • metFORMIN (GLUCOPHAGE) 500 mg tablet TAKE ONE TABLET BY MOUTH EVERY DAY WITH BREAKFAST (GENERIC FOR GLUCOPHAGE)   • metoprolol succinate (TOPROL-XL) 25 mg 24 hr tablet TAKE ONE TABLET BY MOUTH EVERY DAY AT BEDTIME (Patient taking differently: 1/2 tablet at HS)   • Microlet Lancets MISC Use daily       Objective     /74   Pulse 60   Temp (!) 97 °F (36 1 °C)   Resp 20   Ht 5' 8" (1 727 m)   Wt 86 5 kg (190 lb 12 8 oz)   BMI 29 01 kg/m²     Physical Exam  Vitals and nursing note reviewed  Constitutional:       Appearance: He is well-developed  HENT:      Head: Normocephalic and atraumatic  Right Ear: Tympanic membrane and external ear normal       Left Ear: Tympanic membrane and external ear normal    Cardiovascular:      Rate and Rhythm: Normal rate and regular rhythm  Heart sounds: Normal heart sounds  No murmur heard  Pulmonary:      Effort: Pulmonary effort is normal  No respiratory distress  Breath sounds: Normal breath sounds  No wheezing or rales  Musculoskeletal:      Right lower leg: No edema  Left lower leg: No edema         Elnoria Frijinle, DO

## 2023-04-24 ENCOUNTER — OFFICE VISIT (OUTPATIENT)
Dept: CARDIOLOGY CLINIC | Facility: CLINIC | Age: 73
End: 2023-04-24

## 2023-04-24 VITALS
SYSTOLIC BLOOD PRESSURE: 122 MMHG | DIASTOLIC BLOOD PRESSURE: 80 MMHG | WEIGHT: 193 LBS | OXYGEN SATURATION: 99 % | HEIGHT: 68 IN | BODY MASS INDEX: 29.25 KG/M2 | HEART RATE: 52 BPM

## 2023-04-24 DIAGNOSIS — E78.2 MIXED HYPERLIPIDEMIA: ICD-10-CM

## 2023-04-24 DIAGNOSIS — I25.10 CORONARY ARTERY DISEASE INVOLVING NATIVE CORONARY ARTERY OF NATIVE HEART WITHOUT ANGINA PECTORIS: ICD-10-CM

## 2023-04-24 DIAGNOSIS — I21.9 MYOCARDIAL INFARCTION, UNSPECIFIED MI TYPE, UNSPECIFIED ARTERY (HCC): ICD-10-CM

## 2023-04-24 DIAGNOSIS — I70.209 ATHEROSCLEROSIS OF ARTERIES OF EXTREMITIES (HCC): ICD-10-CM

## 2023-04-24 DIAGNOSIS — R00.1 SINUS BRADYCARDIA: ICD-10-CM

## 2023-04-24 DIAGNOSIS — I11.0 HYPERTENSIVE HEART DISEASE WITH HEART FAILURE (HCC): ICD-10-CM

## 2023-04-24 DIAGNOSIS — I10 ESSENTIAL HYPERTENSION: Primary | ICD-10-CM

## 2023-04-24 RX ORDER — ZINC GLUCONATE 50 MG
50 TABLET ORAL DAILY
COMMUNITY

## 2023-04-24 NOTE — PROGRESS NOTES
Cardiology Followup     Solomon Aaron  [id number]  1950  [facility]  [facility]  [address] [address] [id number]    Consult for: CAD    Interval History: Solomon Aaron is a 71y o  year old male  who is here for followup of CAD  Since his last visit, he has been feeling well   he denies any palpitations, chest pain, shortness of breath, LE edema, orthopnea or PND  Diet is overall unchanged  There has not been a significant change in weight  He has gained 5 pounds  He had COVID infection since his last visit  Previously, he had lost 20 pounds since his last visit with diet modification  Most recent blood work showed LDL of 75 mg/dL which is improved from previous levels  His A1c was 6 4%  Past Cardiac History: In February 2012, he had a myocardial infarction where he felt a band like pain across the front of his chest   No associated dyspnea  He underwent PCI *2 to mid LAD with Xience 3 5mm * 12mm  and 4 0 mm * 12mm stents           Past Medical History:   Diagnosis Date   • Acquired ankle/foot deformity     last assessed: 05/30/2017   • Acute myocardial infarction [facility])     last assessed: 12/03/2013   • Anemia     last assessed: 12/03/2013   • Anxiety     last assessed: 12/03/2013   • Cardiomyopathy ([facility] )     last assessed: 12/03/2013   • Congestive heart failure (CHF) ([facility] )     onset: 02/29/2012   • Coronary artery disease    • Diabetes mellitus ([facility] )    • Dysesthesia     last assessed: 09/10/2014   • Exposure to hepatitis     last assessed: 11/15/2016   • Foot slap     last assessed: 03/17/2016   • Hypertension    • Myocardial infarction [facility])    • Palsy of left sciatic nerve     last assessed: 03/17/2016   • Subconjunctival hemorrhage     last assessed: 03/03/2014   • Xerosis cutis     last assessed: 10/25/2016     Social History     Socioeconomic History   • Marital status:      Spouse name: Not on file • Number of children: Not on file   • Years of education: Not on file   • Highest education level: Not on file   Occupational History   • Not on file   Tobacco Use   • Smoking status: Never   • Smokeless tobacco: Never   Vaping Use   • Vaping Use: Never used   Substance and Sexual Activity   • Alcohol use: No   • Drug use: No   • Sexual activity: Not on file   Other Topics Concern   • Not on file   Social History Narrative   • Not on file     Social Determinants of Health     Financial Resource Strain: Low Risk    • Difficulty of Paying Living Expenses: Not hard at all   Food Insecurity: Not on file   Transportation Needs: No Transportation Needs   • Lack of Transportation (Medical): No   • Lack of Transportation (Non-Medical): No   Physical Activity: Not on file   Stress: Not on file   Social Connections: Not on file   Intimate Partner Violence: Not on file   Housing Stability: Not on file      Family History   Problem Relation Age of Onset   • Heart disease Mother         cardiac disorder   • Leukemia Father    • Ovarian cancer Sister      Past Surgical History:   Procedure Laterality Date   • ANGIOPLASTY      2 coronary stents   • CHOLECYSTECTOMY     • COLONOSCOPY N/A 3/17/2016    Procedure: COLONOSCOPY;  Surgeon: Vinay Rose MD;  Location: [facility] GI LAB;   Service:    • HERNIA REPAIR      right inguinal        Current Outpatient Medications:   •  aspirin (ECOTRIN LOW STRENGTH) 81 mg EC tablet, Take 1 tablet (81 mg total) by mouth daily, Disp: 30 tablet, Rfl: 5  •  atorvastatin (LIPITOR) 40 mg tablet, TAKE ONE TABLET BY MOUTH EVERY DAY, Disp: 90 tablet, Rfl: 1  •  clopidogrel (PLAVIX) 75 mg tablet, TAKE ONE TABLET BY MOUTH EVERY DAY, Disp: 90 tablet, Rfl: 1  •  gabapentin (NEURONTIN) 100 mg capsule, TAKE ONE CAPSULE BY MOUTH TWICE A DAY, Disp: 180 capsule, Rfl: 1  •  gabapentin (NEURONTIN) 400 mg capsule, TAKE ONE CAPSULE BY MOUTH FOUR TIMES A DAY, Disp: 360 capsule, Rfl: 1  •  glucose blood (Contour Next "Test) test strip, Use 1 each daily Test blood glucose, Disp: 100 each, Rfl: 3  •  metFORMIN (GLUCOPHAGE) 500 mg tablet, TAKE ONE TABLET BY MOUTH EVERY DAY WITH BREAKFAST (GENERIC FOR GLUCOPHAGE), Disp: 90 tablet, Rfl: 1  •  metoprolol succinate (TOPROL-XL) 25 mg 24 hr tablet, TAKE ONE TABLET BY MOUTH EVERY DAY AT BEDTIME (Patient taking differently: 1/2 tablet at HS), Disp: 90 tablet, Rfl: 1  •  Microlet Lancets MISC, Use daily, Disp: 100 each, Rfl: 3  •  zinc gluconate 50 mg tablet, Take 50 mg by mouth daily, Disp: , Rfl:   No Known Allergies      Review of Systems:  Review of Systems   Constitutional: Positive for fatigue  Respiratory: Negative for shortness of breath  Cardiovascular: Negative for chest pain, palpitations and leg swelling  All other systems reviewed and are negative  Physical Exam:  Vitals:    04/24/23 0750   BP: 122/80   BP Location: Left arm   Patient Position: Sitting   Cuff Size: Standard   Pulse: (!) 52   SpO2: 99%   Weight: 87 5 kg (193 lb)   Height: 5' 8\" (1 727 m)     Physical Exam  Constitutional:       General: He is not in acute distress  Appearance: He is well-developed  He is not diaphoretic  HENT:      Head: Normocephalic and atraumatic  Eyes:      Conjunctiva/sclera: Conjunctivae normal       Pupils: Pupils are equal, round, and reactive to light  Neck:      Thyroid: No thyromegaly  Vascular: No JVD  Cardiovascular:      Rate and Rhythm: Normal rate and regular rhythm  Heart sounds: Normal heart sounds  No murmur heard  No friction rub  No gallop  Pulmonary:      Effort: Pulmonary effort is normal       Breath sounds: Normal breath sounds  Musculoskeletal:      Cervical back: Neck supple  Right lower leg: No edema  Left lower leg: No edema  Skin:     General: Skin is warm and dry  Findings: No erythema or rash  Neurological:      General: No focal deficit present        Mental Status: He is alert and oriented to person, " place, and time  Cranial Nerves: No cranial nerve deficit  Psychiatric:         Mood and Affect: Mood normal          Behavior: Behavior normal          Thought Content: Thought content normal          Judgment: Judgment normal          Labs:  Lab Results   Component Value Date     12/16/2017    K 4 9 02/27/2023     02/27/2023    CO2 27 02/27/2023    BUN 19 02/27/2023    CREATININE 0 87 02/27/2023    CREATININE 0 97 12/16/2017    GLUCOSE 93 12/16/2017    CALCIUM 9 7 12/16/2017     Lab Results   Component Value Date    WBC 7 2 02/27/2023    WBC 8 3 12/09/2016    HGB 16 1 02/27/2023    HGB 16 0 12/09/2016    HCT 48 3 02/27/2023    HCT 48 4 12/09/2016    MCV 88 02/27/2023    MCV 89 12/09/2016     02/27/2023     12/09/2016     Lab Results   Component Value Date    CHOL 153 12/16/2017    TRIG 58 02/27/2023    HDL 61 02/27/2023    LDLDIRECT 80 10/14/2014     Imaging: No results found  EKG (independently reviewed):  Sinus bradycardia at 52 beats per minute with nonspecific T-wave abnormalities    Discussion/Summary:  1  Coronary artery disease involving native coronary artery of native heart without angina pectoris  -  Last stress test was normal without any ischemia  Good functional capacity and normal BP response  - patient has been doing well with diet modification and has overall lost a large amount of weight  Continue working on diet modification  2  Essential hypertension  - Continue benazepril   - No proteinuria on last urine study  3  Diabetic polyneuropathy associated with type 2 diabetes mellitus ([address] )  - Continue metformin    4  Mixed hyperlipidemia  - Last LDL was 75 mg/dL  He is taking atorvastatin 40 mg daily  Given history of CAD,  LDL goal should be less than 70  He was 55 mg/dL last check  Discussed diet and weight loss        5  History of myocardial infarction in adulthood  - patient wishes to remain on both aspirin and Plavix

## 2023-05-08 DIAGNOSIS — G60.9 IDIOPATHIC PERIPHERAL NEUROPATHY: ICD-10-CM

## 2023-05-08 RX ORDER — GABAPENTIN 100 MG/1
CAPSULE ORAL
Qty: 180 CAPSULE | Refills: 1 | Status: SHIPPED | OUTPATIENT
Start: 2023-05-08

## 2023-05-11 DIAGNOSIS — E11.42 DIABETIC POLYNEUROPATHY ASSOCIATED WITH TYPE 2 DIABETES MELLITUS (HCC): ICD-10-CM

## 2023-05-11 RX ORDER — PERPHENAZINE 16 MG/1
TABLET, FILM COATED ORAL
Qty: 100 STRIP | Refills: 3 | Status: SHIPPED | OUTPATIENT
Start: 2023-05-11

## 2023-05-14 DIAGNOSIS — E11.42 DIABETIC POLYNEUROPATHY ASSOCIATED WITH TYPE 2 DIABETES MELLITUS (HCC): ICD-10-CM

## 2023-05-31 LAB
ALBUMIN SERPL-MCNC: 4.5 G/DL (ref 3.7–4.7)
ALBUMIN/GLOB SERPL: 2 {RATIO} (ref 1.2–2.2)
ALP SERPL-CCNC: 71 IU/L (ref 44–121)
ALT SERPL-CCNC: 21 IU/L (ref 0–44)
AST SERPL-CCNC: 18 IU/L (ref 0–40)
BILIRUB SERPL-MCNC: 0.8 MG/DL (ref 0–1.2)
BUN SERPL-MCNC: 14 MG/DL (ref 8–27)
BUN/CREAT SERPL: 15 (ref 10–24)
CALCIUM SERPL-MCNC: 9.4 MG/DL (ref 8.6–10.2)
CHLORIDE SERPL-SCNC: 103 MMOL/L (ref 96–106)
CHOLEST SERPL-MCNC: 128 MG/DL (ref 100–199)
CO2 SERPL-SCNC: 26 MMOL/L (ref 20–29)
CREAT SERPL-MCNC: 0.93 MG/DL (ref 0.76–1.27)
EGFR: 87 ML/MIN/1.73
ERYTHROCYTE [DISTWIDTH] IN BLOOD BY AUTOMATED COUNT: 12.7 % (ref 11.6–15.4)
EST. AVERAGE GLUCOSE BLD GHB EST-MCNC: 143 MG/DL
GLOBULIN SER-MCNC: 2.2 G/DL (ref 1.5–4.5)
GLUCOSE SERPL-MCNC: 111 MG/DL (ref 70–99)
HBA1C MFR BLD: 6.6 % (ref 4.8–5.6)
HCT VFR BLD AUTO: 47.7 % (ref 37.5–51)
HDLC SERPL-MCNC: 55 MG/DL
HGB BLD-MCNC: 15.9 G/DL (ref 13–17.7)
LDLC SERPL CALC-MCNC: 59 MG/DL (ref 0–99)
LDLC/HDLC SERPL: 1.1 RATIO (ref 0–3.6)
MCH RBC QN AUTO: 29 PG (ref 26.6–33)
MCHC RBC AUTO-ENTMCNC: 33.3 G/DL (ref 31.5–35.7)
MCV RBC AUTO: 87 FL (ref 79–97)
MICRODELETION SYND BLD/T FISH: NORMAL
PLATELET # BLD AUTO: 240 X10E3/UL (ref 150–450)
POTASSIUM SERPL-SCNC: 5.2 MMOL/L (ref 3.5–5.2)
PROT SERPL-MCNC: 6.7 G/DL (ref 6–8.5)
RBC # BLD AUTO: 5.48 X10E6/UL (ref 4.14–5.8)
SL AMB VLDL CHOLESTEROL CALC: 14 MG/DL (ref 5–40)
SODIUM SERPL-SCNC: 140 MMOL/L (ref 134–144)
TRIGL SERPL-MCNC: 68 MG/DL (ref 0–149)
VIT B12 SERPL-MCNC: 388 PG/ML (ref 232–1245)
WBC # BLD AUTO: 7.5 X10E3/UL (ref 3.4–10.8)

## 2023-06-02 ENCOUNTER — OFFICE VISIT (OUTPATIENT)
Dept: FAMILY MEDICINE CLINIC | Facility: CLINIC | Age: 73
End: 2023-06-02

## 2023-06-02 VITALS
HEART RATE: 58 BPM | TEMPERATURE: 97.5 F | DIASTOLIC BLOOD PRESSURE: 60 MMHG | OXYGEN SATURATION: 96 % | HEIGHT: 68 IN | WEIGHT: 193 LBS | BODY MASS INDEX: 29.25 KG/M2 | RESPIRATION RATE: 16 BRPM | SYSTOLIC BLOOD PRESSURE: 110 MMHG

## 2023-06-02 DIAGNOSIS — E11.36 TYPE 2 DIABETES MELLITUS WITH DIABETIC CATARACT, WITHOUT LONG-TERM CURRENT USE OF INSULIN (HCC): Primary | ICD-10-CM

## 2023-06-02 DIAGNOSIS — E78.2 MIXED HYPERLIPIDEMIA: ICD-10-CM

## 2023-06-02 DIAGNOSIS — I10 ESSENTIAL HYPERTENSION: ICD-10-CM

## 2023-06-02 NOTE — PROGRESS NOTES
Name: Junie Dozier      : 1950      MRN: [id number]  Encounter Provider: Candido Rodríguez DO  Encounter Date: 2023   Encounter department: [address] [place] [address]     1  Type 2 diabetes mellitus with diabetic cataract, without long-term current use of insulin ([facility] )  Assessment & Plan:    Lab Results   Component Value Date    HGBA1C 6 6 (H) 2023     Well controlled    Orders:  -     Hemoglobin A1C; Future; Expected date: 2023  -     Albumin / creatinine urine ratio; Future; Expected date: 2023  -     Hemoglobin A1C  -     Albumin / creatinine urine ratio    2  Mixed hyperlipidemia  Assessment & Plan:  Well-controlled  Continue atorvastatin 40 mg daily    Orders:  -     Comprehensive metabolic panel; Future; Expected date: 2023  -     Lipid Panel with Direct LDL reflex; Future; Expected date: 2023  -     Comprehensive metabolic panel  -     Lipid Panel with Direct LDL reflex    3  Essential hypertension  Assessment & Plan:  Well controlled  Continue metoprolol 12 5 mg at bedtime    Orders:  -     CBC; Future; Expected date: 2023  -     CBC        Depression Screening and Follow-up Plan: Patient was screened for depression during today's encounter  They screened negative with a PHQ-2 score of 0  Return in about 3 months (around 2023) for Annual Wellness Visit  Subjective      He has been worried about his lady friend's health  He burned his mouth 2 weeks ago on hot juice from a bite of meat  It is finally getting better  He is still running out of energy since having COVID 19  He has started walking again regularly  He is hoping that the walking will help him lose weight      Review of Systems    Current Outpatient Medications on File Prior to Visit   Medication Sig   • aspirin (ECOTRIN LOW STRENGTH) 81 mg EC tablet Take 1 tablet (81 mg total) by mouth daily   • atorvastatin (LIPITOR) 40 mg tablet TAKE ONE TABLET BY MOUTH EVERY "DAY   • clopidogrel (PLAVIX) 75 mg tablet TAKE ONE TABLET BY MOUTH EVERY DAY   • Contour Next Test test strip TEST DAILY   • gabapentin (NEURONTIN) 100 mg capsule TAKE ONE CAPSULE BY MOUTH TWICE A DAY   • gabapentin (NEURONTIN) 400 mg capsule TAKE ONE CAPSULE BY MOUTH FOUR TIMES A DAY   • metFORMIN (GLUCOPHAGE) 500 mg tablet TAKE ONE TABLET BY MOUTH EVERY DAY WITH BREAKFAST (GENERIC FOR GLUCOPHAGE)   • metoprolol succinate (TOPROL-XL) 25 mg 24 hr tablet TAKE ONE TABLET BY MOUTH EVERY DAY AT BEDTIME (Patient taking differently: 1/2 tablet at HS)   • Microlet Lancets MISC Use daily   • zinc gluconate 50 mg tablet Take 50 mg by mouth daily       Objective     /60 (BP Location: Right arm, Patient Position: Sitting, Cuff Size: Large)   Pulse 58   Temp 97 5 °F (36 4 °C) (Temporal)   Resp 16   Ht 5' 8\" (1 727 m)   Wt 87 5 kg (193 lb)   SpO2 96%   BMI 29 35 kg/m²     Physical Exam  Vitals and nursing note reviewed  Constitutional:       Appearance: He is well-developed  HENT:      Head: Normocephalic and atraumatic  Right Ear: Tympanic membrane and external ear normal       Left Ear: Tympanic membrane and external ear normal    Cardiovascular:      Rate and Rhythm: Normal rate and regular rhythm  Pulses: no weak pulses          Dorsalis pedis pulses are 2+ on the right side and 2+ on the left side  Posterior tibial pulses are 2+ on the right side and 2+ on the left side  Heart sounds: Normal heart sounds  No murmur heard  Pulmonary:      Effort: Pulmonary effort is normal  No respiratory distress  Breath sounds: Normal breath sounds  No wheezing or rales  Musculoskeletal:      Right lower leg: No edema  Left lower leg: No edema  Feet:      Right foot:      Skin integrity: No ulcer, skin breakdown, erythema, warmth, callus or dry skin  Left foot:      Skin integrity: No ulcer, skin breakdown, erythema, warmth, callus or dry skin        Patient's shoes and socks " removed  Right Foot/Ankle   Right Foot Inspection  Skin Exam: skin normal  Skin not intact, no dry skin, no warmth, no callus, no erythema, no maceration, no abnormal color, no pre-ulcer, no ulcer and no callus  Toe Exam: ROM and strength within normal limits  Sensory   Monofilament testing: intact    Vascular  Capillary refills: < 3 seconds  The right DP pulse is 2+  The right PT pulse is 2+  Left Foot/Ankle  Left Foot Inspection  Skin Exam: skin normal  Skin not intact, no dry skin, no warmth, no erythema, no maceration, normal color, no pre-ulcer, no ulcer and no callus  Toe Exam: ROM and strength within normal limits  Sensory   Monofilament testing: intact    Vascular  Capillary refills: < 3 seconds  The left DP pulse is 2+  The left PT pulse is 2+       Assign Risk Category  No deformity present  No loss of protective sensation  No weak pulses  Risk: 0    Norm Mcnally, DO

## 2023-08-03 DIAGNOSIS — G60.9 IDIOPATHIC PERIPHERAL NEUROPATHY: ICD-10-CM

## 2023-08-03 DIAGNOSIS — I25.10 CORONARY ARTERY DISEASE INVOLVING NATIVE CORONARY ARTERY OF NATIVE HEART WITHOUT ANGINA PECTORIS: ICD-10-CM

## 2023-08-03 DIAGNOSIS — E78.2 MIXED HYPERLIPIDEMIA: ICD-10-CM

## 2023-08-03 RX ORDER — GABAPENTIN 400 MG/1
CAPSULE ORAL
Qty: 360 CAPSULE | Refills: 1 | Status: SHIPPED | OUTPATIENT
Start: 2023-08-03

## 2023-08-03 RX ORDER — ATORVASTATIN CALCIUM 40 MG/1
TABLET, FILM COATED ORAL
Qty: 90 TABLET | Refills: 1 | Status: SHIPPED | OUTPATIENT
Start: 2023-08-03

## 2023-08-17 DIAGNOSIS — I21.9 MYOCARDIAL INFARCTION, UNSPECIFIED MI TYPE, UNSPECIFIED ARTERY (HCC): ICD-10-CM

## 2023-08-18 RX ORDER — CLOPIDOGREL BISULFATE 75 MG/1
TABLET ORAL
Qty: 90 TABLET | Refills: 1 | Status: SHIPPED | OUTPATIENT
Start: 2023-08-18

## 2023-09-07 ENCOUNTER — RA CDI HCC (OUTPATIENT)
Dept: OTHER | Facility: HOSPITAL | Age: 73
End: 2023-09-07

## 2023-09-07 NOTE — PROGRESS NOTES
[facility] coding opportunities          Chart Reviewed number of suggestions sent to Provider: 1   E11.42    Patients Insurance     Medicare Insurance: Medicare

## 2023-09-14 LAB
ALBUMIN SERPL-MCNC: 4.5 G/DL (ref 3.8–4.8)
ALBUMIN/CREAT UR: 15 MG/G CREAT (ref 0–29)
ALBUMIN/GLOB SERPL: 2 {RATIO} (ref 1.2–2.2)
ALP SERPL-CCNC: 72 IU/L (ref 44–121)
ALT SERPL-CCNC: 23 IU/L (ref 0–44)
AST SERPL-CCNC: 18 IU/L (ref 0–40)
BILIRUB SERPL-MCNC: 0.8 MG/DL (ref 0–1.2)
BUN SERPL-MCNC: 18 MG/DL (ref 8–27)
BUN/CREAT SERPL: 20 (ref 10–24)
CALCIUM SERPL-MCNC: 9.7 MG/DL (ref 8.6–10.2)
CHLORIDE SERPL-SCNC: 104 MMOL/L (ref 96–106)
CHOLEST SERPL-MCNC: 136 MG/DL (ref 100–199)
CO2 SERPL-SCNC: 27 MMOL/L (ref 20–29)
CREAT SERPL-MCNC: 0.91 MG/DL (ref 0.76–1.27)
CREAT UR-MCNC: 127.5 MG/DL
EGFR: 90 ML/MIN/1.73
ERYTHROCYTE [DISTWIDTH] IN BLOOD BY AUTOMATED COUNT: 13.2 % (ref 11.6–15.4)
EST. AVERAGE GLUCOSE BLD GHB EST-MCNC: 143 MG/DL
GLOBULIN SER-MCNC: 2.2 G/DL (ref 1.5–4.5)
GLUCOSE SERPL-MCNC: 109 MG/DL (ref 70–99)
HBA1C MFR BLD: 6.6 % (ref 4.8–5.6)
HCT VFR BLD AUTO: 46.5 % (ref 37.5–51)
HDLC SERPL-MCNC: 56 MG/DL
HGB BLD-MCNC: 15.3 G/DL (ref 13–17.7)
LDLC SERPL CALC-MCNC: 65 MG/DL (ref 0–99)
LDLC/HDLC SERPL: 1.2 RATIO (ref 0–3.6)
MCH RBC QN AUTO: 28.6 PG (ref 26.6–33)
MCHC RBC AUTO-ENTMCNC: 32.9 G/DL (ref 31.5–35.7)
MCV RBC AUTO: 87 FL (ref 79–97)
MICROALBUMIN UR-MCNC: 18.6 UG/ML
MICRODELETION SYND BLD/T FISH: NORMAL
PLATELET # BLD AUTO: 225 X10E3/UL (ref 150–450)
POTASSIUM SERPL-SCNC: 4.8 MMOL/L (ref 3.5–5.2)
PROT SERPL-MCNC: 6.7 G/DL (ref 6–8.5)
RBC # BLD AUTO: 5.35 X10E6/UL (ref 4.14–5.8)
SL AMB VLDL CHOLESTEROL CALC: 15 MG/DL (ref 5–40)
SODIUM SERPL-SCNC: 142 MMOL/L (ref 134–144)
TRIGL SERPL-MCNC: 79 MG/DL (ref 0–149)
WBC # BLD AUTO: 8.1 X10E3/UL (ref 3.4–10.8)

## 2023-09-15 ENCOUNTER — OFFICE VISIT (OUTPATIENT)
Dept: FAMILY MEDICINE CLINIC | Facility: CLINIC | Age: 73
End: 2023-09-15
Payer: MEDICARE

## 2023-09-15 VITALS
RESPIRATION RATE: 17 BRPM | DIASTOLIC BLOOD PRESSURE: 66 MMHG | TEMPERATURE: 98.1 F | HEART RATE: 54 BPM | HEIGHT: 68 IN | OXYGEN SATURATION: 98 % | WEIGHT: 195.6 LBS | SYSTOLIC BLOOD PRESSURE: 116 MMHG | BODY MASS INDEX: 29.64 KG/M2

## 2023-09-15 DIAGNOSIS — E11.36 TYPE 2 DIABETES MELLITUS WITH DIABETIC CATARACT, WITHOUT LONG-TERM CURRENT USE OF INSULIN (HCC): ICD-10-CM

## 2023-09-15 DIAGNOSIS — I11.0 HYPERTENSIVE HEART DISEASE WITH HEART FAILURE (HCC): ICD-10-CM

## 2023-09-15 DIAGNOSIS — Z00.00 MEDICARE ANNUAL WELLNESS VISIT, SUBSEQUENT: Primary | ICD-10-CM

## 2023-09-15 DIAGNOSIS — E78.2 MIXED HYPERLIPIDEMIA: ICD-10-CM

## 2023-09-15 PROCEDURE — G0439 PPPS, SUBSEQ VISIT: HCPCS | Performed by: FAMILY MEDICINE

## 2023-09-15 NOTE — PATIENT INSTRUCTIONS
Medicare Preventive Visit Patient Instructions  Thank you for completing your Welcome to Medicare Visit or Medicare Annual Wellness Visit today. Your next wellness visit will be due in one year (9/15/2024). The screening/preventive services that you may require over the next 5-10 years are detailed below. Some tests may not apply to you based off risk factors and/or age. Screening tests ordered at today's visit but not completed yet may show as past due. Also, please note that scanned in results may not display below. Preventive Screenings:  Service Recommendations Previous Testing/Comments   Colorectal Cancer Screening  · Colonoscopy    · Fecal Occult Blood Test (FOBT)/Fecal Immunochemical Test (FIT)  · Fecal DNA/Cologuard Test  · Flexible Sigmoidoscopy Age: 43-73 years old   Colonoscopy: every 10 years (May be performed more frequently if at higher risk)  OR  FOBT/FIT: every 1 year  OR  Cologuard: every 3 years  OR  Sigmoidoscopy: every 5 years  Screening may be recommended earlier than age 39 if at higher risk for colorectal cancer. Also, an individualized decision between you and your healthcare provider will decide whether screening between the ages of 77-80 would be appropriate.  Colonoscopy: 04/27/2021  FOBT/FIT: Not on file  Cologuard: Not on file  Sigmoidoscopy: Not on file    Screening Current     Prostate Cancer Screening Individualized decision between patient and health care provider in men between ages of 53-66   Medicare will cover every 12 months beginning on the day after your 50th birthday PSA: 1.4 ng/mL     Screening Current     Hepatitis C Screening Once for adults born between 1945 and 1965  More frequently in patients at high risk for Hepatitis C Hep C Antibody: 06/22/2017    Screening Current   Diabetes Screening 1-2 times per year if you're at risk for diabetes or have pre-diabetes Fasting glucose: No results in last 5 years (No results in last 5 years)  A1C: 6.6 % (9/13/2023)  Screening Not Indicated  History Diabetes   Cholesterol Screening Once every 5 years if you don't have a lipid disorder. May order more often based on risk factors. Lipid panel: 09/13/2023  Screening Not Indicated  History Lipid Disorder      Other Preventive Screenings Covered by Medicare:  1. Abdominal Aortic Aneurysm (AAA) Screening: covered once if your at risk. You're considered to be at risk if you have a family history of AAA or a male between the age of 70-76 who smoking at least 100 cigarettes in your lifetime. 2. Lung Cancer Screening: covers low dose CT scan once per year if you meet all of the following conditions: (1) Age 48-67; (2) No signs or symptoms of lung cancer; (3) Current smoker or have quit smoking within the last 15 years; (4) You have a tobacco smoking history of at least 20 pack years (packs per day x number of years you smoked); (5) You get a written order from a healthcare provider. 3. Glaucoma Screening: covered annually if you're considered high risk: (1) You have diabetes OR (2) Family history of glaucoma OR (3)  aged 48 and older OR (3)  American aged 72 and older  3. Osteoporosis Screening: covered every 2 years if you meet one of the following conditions: (1) Have a vertebral abnormality; (2) On glucocorticoid therapy for more than 3 months; (3) Have primary hyperparathyroidism; (4) On osteoporosis medications and need to assess response to drug therapy. 5. HIV Screening: covered annually if you're between the age of 14-79. Also covered annually if you are younger than 13 and older than 72 with risk factors for HIV infection. For pregnant patients, it is covered up to 3 times per pregnancy.     Immunizations:  Immunization Recommendations   Influenza Vaccine Annual influenza vaccination during flu season is recommended for all persons aged >= 6 months who do not have contraindications   Pneumococcal Vaccine   * Pneumococcal conjugate vaccine = PCV13 (Prevnar 13), PCV15 (Vaxneuvance), PCV20 (Prevnar 20)  * Pneumococcal polysaccharide vaccine = PPSV23 (Pneumovax) Adults 2364 years old: 1-3 doses may be recommended based on certain risk factors  Adults 72 years old: 1-2 doses may be recommended based off what pneumonia vaccine you previously received   Hepatitis B Vaccine 3 dose series if at intermediate or high risk (ex: diabetes, end stage renal disease, liver disease)   Tetanus (Td) Vaccine - COST NOT COVERED BY MEDICARE PART B Following completion of primary series, a booster dose should be given every 10 years to maintain immunity against tetanus. Td may also be given as tetanus wound prophylaxis. Tdap Vaccine - COST NOT COVERED BY MEDICARE PART B Recommended at least once for all adults. For pregnant patients, recommended with each pregnancy. Shingles Vaccine (Shingrix) - COST NOT COVERED BY MEDICARE PART B  2 shot series recommended in those aged 48 and above     Health Maintenance Due:      Topic Date Due   • Colorectal Cancer Screening  04/27/2026   • Hepatitis C Screening  Completed     Immunizations Due:      Topic Date Due   • COVID-19 Vaccine (7 - Moderna series) 01/22/2023   • Influenza Vaccine (1) 09/01/2023     Advance Directives   What are advance directives? Advance directives are legal documents that state your wishes and plans for medical care. These plans are made ahead of time in case you lose your ability to make decisions for yourself. Advance directives can apply to any medical decision, such as the treatments you want, and if you want to donate organs. What are the types of advance directives? There are many types of advance directives, and each state has rules about how to use them. You may choose a combination of any of the following:  · Living will: This is a written record of the treatment you want. You can also choose which treatments you do not want, which to limit, and which to stop at a certain time.  This includes surgery, medicine, IV fluid, and tube feedings. · Durable power of  for healthcare [facility]): This is a written record that states who you want to make healthcare choices for you when you are unable to make them for yourself. This person, called a proxy, is usually a family member or a friend. You may choose more than 1 proxy. · Do not resuscitate (DNR) order:  A DNR order is used in case your heart stops beating or you stop breathing. It is a request not to have certain forms of treatment, such as CPR. A DNR order may be included in other types of advance directives. · Medical directive: This covers the care that you want if you are in a coma, near death, or unable to make decisions for yourself. You can list the treatments you want for each condition. Treatment may include pain medicine, surgery, blood transfusions, dialysis, IV or tube feedings, and a ventilator (breathing machine). · Values history: This document has questions about your views, beliefs, and how you feel and think about life. This information can help others choose the care that you would choose. Why are advance directives important? An advance directive helps you control your care. Although spoken wishes may be used, it is better to have your wishes written down. Spoken wishes can be misunderstood, or not followed. Treatments may be given even if you do not want them. An advance directive may make it easier for your family to make difficult choices about your care. Weight Management   Why it is important to manage your weight:  Being overweight increases your risk of health conditions such as heart disease, high blood pressure, type 2 diabetes, and certain types of cancer. It can also increase your risk for osteoarthritis, sleep apnea, and other respiratory problems. Aim for a slow, steady weight loss. Even a small amount of weight loss can lower your risk of health problems.   How to lose weight safely:  A safe and healthy way to lose weight is to eat fewer calories and get regular exercise. You can lose up about 1 pound a week by decreasing the number of calories you eat by 500 calories each day. Healthy meal plan for weight management:  A healthy meal plan includes a variety of foods, contains fewer calories, and helps you stay healthy. A healthy meal plan includes the following:  · Eat whole-grain foods more often. A healthy meal plan should contain fiber. Fiber is the part of grains, fruits, and vegetables that is not broken down by your body. Whole-grain foods are healthy and provide extra fiber in your diet. Some examples of whole-grain foods are whole-wheat breads and pastas, oatmeal, brown rice, and bulgur. · Eat a variety of vegetables every day. Include dark, leafy greens such as spinach, kale, fallon greens, and mustard greens. Eat yellow and orange vegetables such as carrots, sweet potatoes, and winter squash. · Eat a variety of fruits every day. Choose fresh or canned fruit (canned in its own juice or light syrup) instead of juice. Fruit juice has very little or no fiber. · Eat low-fat dairy foods. Drink fat-free (skim) milk or 1% milk. Eat fat-free yogurt and low-fat cottage cheese. Try low-fat cheeses such as mozzarella and other reduced-fat cheeses. · Choose meat and other protein foods that are low in fat. Choose beans or other legumes such as split peas or lentils. Choose fish, skinless poultry (chicken or turkey), or lean cuts of red meat (beef or pork). Before you cook meat or poultry, cut off any visible fat. · Use less fat and oil. Try baking foods instead of frying them. Add less fat, such as margarine, sour cream, regular salad dressing and mayonnaise to foods. Eat fewer high-fat foods. Some examples of high-fat foods include french fries, doughnuts, ice cream, and cakes. · Eat fewer sweets. Limit foods and drinks that are high in sugar. This includes candy, cookies, regular soda, and sweetened drinks.   Exercise: Exercise at least 30 minutes per day on most days of the week. Some examples of exercise include walking, biking, dancing, and swimming. You can also fit in more physical activity by taking the stairs instead of the elevator or parking farther away from stores. Ask your healthcare provider about the best exercise plan for you. © Copyright [employer] 2018 Information is for End User's use only and may not be sold, redistributed or otherwise used for commercial purposes.  All illustrations and images included in CareNotes® are the copyrighted property of [employer] Inc. or  [employer]

## 2023-09-15 NOTE — ASSESSMENT & PLAN NOTE
Wt Readings from Last 3 Encounters:   09/15/23 88.7 kg (195 lb 9.6 oz)   06/02/23 87.5 kg (193 lb)   04/24/23 87.5 kg (193 lb)         Stable

## 2023-09-15 NOTE — PROGRESS NOTES
Assessment and Plan:     Problem List Items Addressed This Visit     Hypertensive heart disease with heart failure ([address])     Wt Readings from Last 3 Encounters:   09/15/23 88.7 kg (195 lb 9.6 oz)   06/02/23 87.5 kg (193 lb)   04/24/23 87.5 kg (193 lb)         Stable          Mixed hyperlipidemia     Stable          Type 2 diabetes mellitus with diabetic cataract, without long-term current use of insulin ([address])       Lab Results   Component Value Date    HGBA1C 6.6 (H) 09/13/2023      Well controlled          Relevant Orders    Comprehensive metabolic panel    Hemoglobin A1C   Other Visit Diagnoses     Medicare annual wellness visit, subsequent    -  Primary      Return in about 3 months (around 12/15/2023) for Next scheduled follow up. Preventive health issues were discussed with patient, and age appropriate screening tests were ordered as noted in patient's After Visit Summary. Personalized health advice and appropriate referrals for health education or preventive services given if needed, as noted in patient's After Visit Summary. History of Present Illness:     Patient presents for a Medicare Wellness Visit    Still having headaches since having COVID 19  No new symptoms     Patient Care Team:  Srinivas Morales DO as PCP - MD Carrie Meléndez OD Blane Mola, DO Gwendlyn Boys, MD as Endoscopist  Michael Trevizo DO (Cardiology)     Review of Systems:     Review of Systems   Constitutional: Negative. Respiratory: Negative. Cardiovascular: Negative.          Problem List:     Patient Active Problem List   Diagnosis   • Essential hypertension   • Diabetic polyneuropathy associated with type 2 diabetes mellitus ([address])   • Mixed hyperlipidemia   • Myocardial infarction [facility])   • Nerve palsy   • Onychomycosis   • Peripheral neuropathy   • Reflex sympathetic dystrophy   • Tabes dorsalis   • Atherosclerosis of arteries of extremities (HCC)   • Hyperkalemia   • Coronary artery disease involving native coronary artery of native heart without angina pectoris   • Anxiety   • CHF (congestive heart failure) ([address])   • History of PTCA with stents   • Hypertensive heart disease with heart failure (HCC)   • Type 2 diabetes mellitus with diabetic cataract, without long-term current use of insulin ([address])   • Post-acute sequelae of COVID-19 [facility])      Past Medical and Surgical History:     Past Medical History:   Diagnosis Date   • Acquired ankle/foot deformity     last assessed: 05/30/2017   • Acute myocardial infarction [facility])     last assessed: 12/03/2013   • Anemia     last assessed: 12/03/2013   • Anxiety     last assessed: 12/03/2013   • Cardiomyopathy ([address])     last assessed: 12/03/2013   • Congestive heart failure (CHF) ([address])     onset: 02/29/2012   • Coronary artery disease    • Diabetes mellitus ([address])    • Dysesthesia     last assessed: 09/10/2014   • Exposure to hepatitis     last assessed: 11/15/2016   • Foot slap     last assessed: 03/17/2016   • Hypertension    • Myocardial infarction [facility])    • Palsy of left sciatic nerve     last assessed: 03/17/2016   • Subconjunctival hemorrhage     last assessed: 03/03/2014   • Xerosis cutis     last assessed: 10/25/2016     Past Surgical History:   Procedure Laterality Date   • ANGIOPLASTY      2 coronary stents   • CHOLECYSTECTOMY     • COLONOSCOPY N/A 3/17/2016    Procedure: COLONOSCOPY;  Surgeon: Beau Maria MD;  Location: [address] [place] [address] GI LAB;   Service:    • HERNIA REPAIR      right inguinal       Family History:     Family History   Problem Relation Age of Onset   • Heart disease Mother         cardiac disorder   • Leukemia Father    • Ovarian cancer Sister       Social History:     Social History     Socioeconomic History   • Marital status:      Spouse name: None   • Number of children: None   • Years of education: None   • Highest education level: None   Occupational History   • None   Tobacco Use   • Smoking status: Never     Passive exposure: Never   • Smokeless tobacco: Never   Vaping Use   • Vaping Use: Never used   Substance and Sexual Activity   • Alcohol use: No   • Drug use: No   • Sexual activity: None   Other Topics Concern   • None   Social History Narrative   • None     Social Determinants of Health     Financial Resource Strain: Low Risk  (9/15/2023)    Overall Financial Resource Strain (CARDIA)    • Difficulty of Paying Living Expenses: Not hard at all   Food Insecurity: Not on file   Transportation Needs: No Transportation Needs (9/15/2023)    PRAPARE - Transportation    • Lack of Transportation (Medical): No    • Lack of Transportation (Non-Medical): No   Physical Activity: Not on file   Stress: Not on file   Social Connections: Not on file   Intimate Partner Violence: Not on file   Housing Stability: Not on file      Medications and Allergies:     Current Outpatient Medications   Medication Sig Dispense Refill   • aspirin (ECOTRIN LOW STRENGTH) 81 mg EC tablet Take 1 tablet (81 mg total) by mouth daily 30 tablet 5   • atorvastatin (LIPITOR) 40 mg tablet TAKE ONE TABLET BY MOUTH EVERY DAY 90 tablet 1   • clopidogrel (PLAVIX) 75 mg tablet TAKE ONE TABLET BY MOUTH EVERY DAY 90 tablet 1   • Contour Next Test test strip TEST DAILY 100 strip 3   • gabapentin (NEURONTIN) 100 mg capsule TAKE ONE CAPSULE BY MOUTH TWICE A  capsule 1   • gabapentin (NEURONTIN) 400 mg capsule TAKE ONE CAPSULE BY MOUTH FOUR TIMES A  capsule 1   • metFORMIN (GLUCOPHAGE) 500 mg tablet TAKE ONE TABLET BY MOUTH EVERY DAY WITH BREAKFAST (GENERIC FOR GLUCOPHAGE) 90 tablet 1   • metoprolol succinate (TOPROL-XL) 25 mg 24 hr tablet TAKE ONE TABLET BY MOUTH EVERY DAY AT BEDTIME (Patient taking differently: 1/2 tablet at HS) 90 tablet 1   • Microlet Lancets MISC Use daily 100 each 3   • zinc gluconate 50 mg tablet Take 50 mg by mouth daily       No current facility-administered medications for this visit.      No Known Allergies   Immunizations:     Immunization History Administered Date(s) Administered   • COVID-19 MODERNA VACC 0.25 ML IM BOOSTER 11/05/2021   • COVID-19 MODERNA VACC 0.5 ML IM 02/10/2021, 03/10/2021, 11/05/2021, 04/07/2022, 09/22/2022   • INFLUENZA 09/01/2018, 09/26/2019, 09/09/2022   • Influenza Quadrivalent Preservative Free 3 years and older IM 10/21/2014, 10/15/2015   • Influenza Split High Dose Preservative Free IM 09/08/2016   • Influenza, high dose seasonal 0.7 mL 09/01/2020, 08/31/2021   • Influenza, seasonal, injectable 08/25/2017   • Pneumococcal Conjugate 13-Valent 11/17/2015   • Pneumococcal Polysaccharide PPV23 10/21/2014, 09/27/2017   • Tdap 10/07/2016   • Unknown 02/20/2018, 05/15/2018   • Zoster 10/21/2014, 03/20/2018   • Zoster Vaccine Recombinant 05/15/2018      Health Maintenance:         Topic Date Due   • Colorectal Cancer Screening  04/27/2026   • Hepatitis C Screening  Completed         Topic Date Due   • COVID-19 Vaccine (7 - Jessica Frames series) 01/22/2023   • Influenza Vaccine (1) 09/01/2023      Medicare Screening Tests and Risk Assessments:     Nelida Hitchcock is here for his Subsequent Wellness visit. Health Risk Assessment:   Patient rates overall health as excellent. Patient feels that their physical health rating is slightly better. Patient is very satisfied with their life. Eyesight was rated as same. Hearing was rated as same. Patient feels that their emotional and mental health rating is same. Patients states they are never, rarely angry. Patient states they are sometimes unusually tired/fatigued. Pain experienced in the last 7 days has been some. Patient's pain rating has been 3/10. Patient states that he has experienced no weight loss or gain in last 6 months. Depression Screening:   PHQ-2 Score: 0      Fall Risk Screening: In the past year, patient has experienced: no history of falling in past year      Home Safety:  Patient does not have trouble with stairs inside or outside of their home.  Patient has working smoke alarms and has working carbon monoxide detector. Home safety hazards include: none. Nutrition:   Current diet is Regular. Medications:   Patient is currently taking over-the-counter supplements. OTC medications include: see medication list. Patient is able to manage medications. Activities of Daily Living (ADLs)/Instrumental Activities of Daily Living (IADLs):   Walk and transfer into and out of bed and chair?: Yes  Dress and groom yourself?: Yes    Bathe or shower yourself?: Yes    Feed yourself?  Yes  Do your laundry/housekeeping?: Yes  Manage your money, pay your bills and track your expenses?: Yes  Make your own meals?: Yes    Do your own shopping?: Yes    Previous Hospitalizations:   Any hospitalizations or ED visits within the last 12 months?: No      Advance Care Planning:   Living will: Yes    Advanced directive: Yes    Advanced directive counseling given: Yes    End of Life Decisions reviewed with patient: Yes    Provider agrees with end of life decisions: Yes      Cognitive Screening:   Provider or family/friend/caregiver concerned regarding cognition?: No    PREVENTIVE SCREENINGS      Cardiovascular Screening:    General: Screening Not Indicated and History Lipid Disorder      Diabetes Screening:     General: Screening Not Indicated and History Diabetes      Colorectal Cancer Screening:     General: Screening Current      Prostate Cancer Screening:    General: Screening Current      Osteoporosis Screening:    General: Screening Not Indicated      Abdominal Aortic Aneurysm (AAA) Screening:    Risk factors include: age between 70-75 yo        Lung Cancer Screening:     General: Screening Not Indicated      Hepatitis C Screening:    General: Screening Current    Screening, Brief Intervention, and Referral to Treatment (SBIRT)    Screening      AUDIT-C Screenin) How often did you have a drink containing alcohol in the past year? never  2) How many drinks did you have on a typical day when you were drinking in the past year? 0  3) How often did you have 6 or more drinks on one occasion in the past year? never    AUDIT-C Score: 0  Interpretation: Score 0-3 (male): Negative screen for alcohol misuse    Single Item Drug Screening:  How often have you used an illegal drug (including marijuana) or a prescription medication for non-medical reasons in the past year? never    Single Item Drug Screen Score: 0  Interpretation: Negative screen for possible drug use disorder    Brief Intervention  Alcohol & drug use screenings were reviewed. No concerns regarding substance use disorder identified. No results found. Physical Exam:     /66   Pulse (!) 54   Temp 98.1 °F (36.7 °C)   Resp 17   Ht 5' 8" (1.727 m)   Wt 88.7 kg (195 lb 9.6 oz)   SpO2 98%   BMI 29.74 kg/m²     Physical Exam  Vitals reviewed. Constitutional:       Appearance: He is well-developed. HENT:      Head: Normocephalic and atraumatic. Right Ear: External ear normal.      Left Ear: External ear normal.   Cardiovascular:      Rate and Rhythm: Normal rate and regular rhythm. Heart sounds: Normal heart sounds. No murmur heard. Pulmonary:      Effort: Pulmonary effort is normal. No respiratory distress. Breath sounds: Normal breath sounds. No wheezing. Musculoskeletal:         General: Normal range of motion. Skin:     General: Skin is warm and dry. Neurological:      Mental Status: He is alert and oriented to person, place, and time.           Pooja Mohr DO

## 2023-10-17 ENCOUNTER — OFFICE VISIT (OUTPATIENT)
Dept: CARDIOLOGY CLINIC | Facility: CLINIC | Age: 73
End: 2023-10-17
Payer: MEDICARE

## 2023-10-17 VITALS
HEART RATE: 55 BPM | SYSTOLIC BLOOD PRESSURE: 128 MMHG | DIASTOLIC BLOOD PRESSURE: 88 MMHG | WEIGHT: 192 LBS | BODY MASS INDEX: 29.1 KG/M2 | HEIGHT: 68 IN

## 2023-10-17 DIAGNOSIS — I25.10 CORONARY ARTERY DISEASE INVOLVING NATIVE CORONARY ARTERY OF NATIVE HEART WITHOUT ANGINA PECTORIS: Primary | ICD-10-CM

## 2023-10-17 DIAGNOSIS — E78.2 MIXED HYPERLIPIDEMIA: ICD-10-CM

## 2023-10-17 DIAGNOSIS — I11.0 HYPERTENSIVE HEART DISEASE WITH HEART FAILURE (HCC): ICD-10-CM

## 2023-10-17 DIAGNOSIS — I10 ESSENTIAL HYPERTENSION: ICD-10-CM

## 2023-10-17 DIAGNOSIS — R00.1 SINUS BRADYCARDIA: ICD-10-CM

## 2023-10-17 PROCEDURE — 93000 ELECTROCARDIOGRAM COMPLETE: CPT | Performed by: INTERNAL MEDICINE

## 2023-10-17 PROCEDURE — 99214 OFFICE O/P EST MOD 30 MIN: CPT | Performed by: INTERNAL MEDICINE

## 2023-10-17 NOTE — PROGRESS NOTES
Cardiology Followup     Shan Mojica  [id number]  1950  [facility] PROFESSIONAL [facility] [address] [address]    Consult for: CAD    Interval History: Shan Mojica is a 68y.o. year old male  who is here for followup of CAD. Since his last visit, he has been feeling well.  he denies any palpitations, chest pain, shortness of breath, LE edema, orthopnea or PND. Diet is overall unchanged. There has not been a significant change in weight. Most recent blood work showed LDL of 65 mg/dL which is improved from previous levels. His A1c was 6.6%. Past Cardiac History: In February 2012, he had a myocardial infarction where he felt a band like pain across the front of his chest.  No associated dyspnea. He underwent PCI *2 to mid LAD with Xience 3.5mm * 12mm  and 4.0 mm * 12mm stents.          Past Medical History:   Diagnosis Date    Acquired ankle/foot deformity     last assessed: 05/30/2017    Acute myocardial infarction [facility])     last assessed: 12/03/2013    Anemia     last assessed: 12/03/2013    Anxiety     last assessed: 12/03/2013    Cardiomyopathy ([address])     last assessed: 12/03/2013    Congestive heart failure (CHF) ([address])     onset: 02/29/2012    Coronary artery disease     Diabetes mellitus ([address])     Dysesthesia     last assessed: 09/10/2014    Exposure to hepatitis     last assessed: 11/15/2016    Foot slap     last assessed: 03/17/2016    Hypertension     Myocardial infarction [facility])     Palsy of left sciatic nerve     last assessed: 03/17/2016    Subconjunctival hemorrhage     last assessed: 03/03/2014    Xerosis cutis     last assessed: 10/25/2016     Social History     Socioeconomic History    Marital status:      Spouse name: Not on file    Number of children: Not on file    Years of education: Not on file    Highest education level: Not on file   Occupational History    Not on file   Tobacco Use Smoking status: Never     Passive exposure: Never    Smokeless tobacco: Never   Vaping Use    Vaping Use: Never used   Substance and Sexual Activity    Alcohol use: No    Drug use: No    Sexual activity: Not on file   Other Topics Concern    Not on file   Social History Narrative    Not on file     Social Determinants of Health     Financial Resource Strain: Low Risk  (9/15/2023)    Overall Financial Resource Strain (CARDIA)     Difficulty of Paying Living Expenses: Not hard at all   Food Insecurity: Not on file   Transportation Needs: No Transportation Needs (9/15/2023)    PRAPARE - Transportation     Lack of Transportation (Medical): No     Lack of Transportation (Non-Medical): No   Physical Activity: Not on file   Stress: Not on file   Social Connections: Not on file   Intimate Partner Violence: Not on file   Housing Stability: Not on file      Family History   Problem Relation Age of Onset    Heart disease Mother         cardiac disorder    Leukemia Father     Ovarian cancer Sister      Past Surgical History:   Procedure Laterality Date    ANGIOPLASTY      2 coronary stents    CHOLECYSTECTOMY      COLONOSCOPY N/A 3/17/2016    Procedure: COLONOSCOPY;  Surgeon: Deisy Ahs MD;  Location: [address] [place] [address] GI LAB;   Service:     HERNIA REPAIR      right inguinal        Current Outpatient Medications:     aspirin (ECOTRIN LOW STRENGTH) 81 mg EC tablet, Take 1 tablet (81 mg total) by mouth daily, Disp: 30 tablet, Rfl: 5    atorvastatin (LIPITOR) 40 mg tablet, TAKE ONE TABLET BY MOUTH EVERY DAY, Disp: 90 tablet, Rfl: 1    clopidogrel (PLAVIX) 75 mg tablet, TAKE ONE TABLET BY MOUTH EVERY DAY, Disp: 90 tablet, Rfl: 1    Contour Next Test test strip, TEST DAILY, Disp: 100 strip, Rfl: 3    gabapentin (NEURONTIN) 100 mg capsule, TAKE ONE CAPSULE BY MOUTH TWICE A DAY, Disp: 180 capsule, Rfl: 1    gabapentin (NEURONTIN) 400 mg capsule, TAKE ONE CAPSULE BY MOUTH FOUR TIMES A DAY, Disp: 360 capsule, Rfl: 1    metFORMIN (GLUCOPHAGE) 500 mg tablet, TAKE ONE TABLET BY MOUTH EVERY DAY WITH BREAKFAST (GENERIC FOR GLUCOPHAGE), Disp: 90 tablet, Rfl: 1    metoprolol succinate (TOPROL-XL) 25 mg 24 hr tablet, TAKE ONE TABLET BY MOUTH EVERY DAY AT BEDTIME (Patient taking differently: 1/2 tablet at HS), Disp: 90 tablet, Rfl: 1    Microlet Lancets MISC, Use daily, Disp: 100 each, Rfl: 3    zinc gluconate 50 mg tablet, Take 50 mg by mouth daily, Disp: , Rfl:   No Known Allergies      Review of Systems:  Review of Systems   Constitutional:  Positive for fatigue. Respiratory:  Negative for shortness of breath. Cardiovascular:  Negative for chest pain, palpitations and leg swelling. Musculoskeletal:  Positive for arthralgias. All other systems reviewed and are negative. Physical Exam:  Vitals:    10/17/23 0815   BP: 128/88   BP Location: Right arm   Patient Position: Sitting   Cuff Size: Standard   Pulse: 55   Weight: 87.1 kg (192 lb)   Height: 5' 8" (1.727 m)     Physical Exam  Constitutional:       General: He is not in acute distress. Appearance: Normal appearance. He is well-developed. He is not diaphoretic. HENT:      Head: Normocephalic and atraumatic. Eyes:      General: No scleral icterus. Conjunctiva/sclera: Conjunctivae normal.      Pupils: Pupils are equal, round, and reactive to light. Neck:      Thyroid: No thyromegaly. Vascular: No JVD. Cardiovascular:      Rate and Rhythm: Normal rate and regular rhythm. Heart sounds: Normal heart sounds. No murmur heard. No friction rub. No gallop. Pulmonary:      Effort: Pulmonary effort is normal.      Breath sounds: Normal breath sounds. Musculoskeletal:      Cervical back: Neck supple. Skin:     General: Skin is warm and dry. Findings: No erythema or rash. Neurological:      General: No focal deficit present. Mental Status: He is alert and oriented to person, place, and time. Mental status is at baseline.    Psychiatric:         Mood and Affect: Mood normal.         Behavior: Behavior normal.         Thought Content: Thought content normal.         Judgment: Judgment normal.         Labs:  Lab Results   Component Value Date     12/16/2017    K 4.8 09/13/2023     09/13/2023    CO2 27 09/13/2023    BUN 18 09/13/2023    CREATININE 0.91 09/13/2023    CREATININE 0.97 12/16/2017    GLUCOSE 93 12/16/2017    CALCIUM 9.7 12/16/2017     Lab Results   Component Value Date    WBC 8.1 09/13/2023    WBC 8.3 12/09/2016    HGB 15.3 09/13/2023    HGB 16.0 12/09/2016    HCT 46.5 09/13/2023    HCT 48.4 12/09/2016    MCV 87 09/13/2023    MCV 89 12/09/2016     09/13/2023     12/09/2016     Lab Results   Component Value Date    CHOL 153 12/16/2017    TRIG 79 09/13/2023    HDL 56 09/13/2023    LDLDIRECT 80 10/14/2014     Imaging: No results found. EKG (independently reviewed):  Sinus bradycardia at 55 beats per minute with nonspecific T-wave abnormalities    Discussion/Summary:  1. Coronary artery disease involving native coronary artery of native heart without angina pectoris  -  Last stress test was normal without any ischemia. Good functional capacity and normal BP response. - patient has been doing well with diet modification and has overall lost a large amount of weight. Continue working on diet modification. 2. Essential hypertension  - Continue benazepril   - No proteinuria on last urine study. 3. Diabetic polyneuropathy associated with type 2 diabetes mellitus ([address])  - Continue metformin    4. Mixed hyperlipidemia  - Last LDL was 65 mg/dL. He is taking atorvastatin 40 mg daily. Given history of CAD,  LDL goal should be less than 70.    - Discussed diet and weight loss.       5. History of myocardial infarction in adulthood  - patient wishes to remain on both aspirin and Plavix

## 2023-10-23 DIAGNOSIS — Z23 NEED FOR VACCINATION: Primary | ICD-10-CM

## 2023-10-30 ENCOUNTER — TELEPHONE (OUTPATIENT)
Dept: FAMILY MEDICINE CLINIC | Facility: CLINIC | Age: 73
End: 2023-10-30

## 2023-10-30 NOTE — TELEPHONE ENCOUNTER
Please update his chart  May need to call [facility] to find out which RSV vaccine he received  Thank you,  Remington Lyles, DO

## 2023-10-30 NOTE — TELEPHONE ENCOUNTER
Regarding: Flu Shot   Contact: [phone]  Hi Dr Sade Victoria, How are you doing ?!?! …..…. Well, here is another update, lol, this morning I got the RSV Vaccine at [address] [place] [address]. (Friday, October 27, 2023 …. 11am)    it is a One and Done Shot  ……  I think/hope all my shots are up to date,  lol,   Have an awesome day and a great weekend !!!!!     Thank You Doctor,  for all you do for me and how You care for me,   Andrea Mullins !!!!!

## 2023-11-06 DIAGNOSIS — G60.9 IDIOPATHIC PERIPHERAL NEUROPATHY: ICD-10-CM

## 2023-11-06 DIAGNOSIS — I10 ESSENTIAL HYPERTENSION: ICD-10-CM

## 2023-11-06 RX ORDER — GABAPENTIN 100 MG/1
CAPSULE ORAL
Qty: 180 CAPSULE | Refills: 1 | Status: SHIPPED | OUTPATIENT
Start: 2023-11-06

## 2023-11-06 RX ORDER — METOPROLOL SUCCINATE 25 MG/1
TABLET, EXTENDED RELEASE ORAL
Qty: 90 TABLET | Refills: 1 | Status: SHIPPED | OUTPATIENT
Start: 2023-11-06

## 2023-11-15 DIAGNOSIS — E11.42 DIABETIC POLYNEUROPATHY ASSOCIATED WITH TYPE 2 DIABETES MELLITUS (HCC): ICD-10-CM

## 2023-12-13 ENCOUNTER — RA CDI HCC (OUTPATIENT)
Dept: OTHER | Facility: HOSPITAL | Age: 73
End: 2023-12-13

## 2023-12-15 LAB
ALBUMIN SERPL-MCNC: 4.4 G/DL (ref 3.8–4.8)
ALBUMIN/GLOB SERPL: 2 {RATIO} (ref 1.2–2.2)
ALP SERPL-CCNC: 75 IU/L (ref 44–121)
ALT SERPL-CCNC: 22 IU/L (ref 0–44)
AST SERPL-CCNC: 17 IU/L (ref 0–40)
BILIRUB SERPL-MCNC: 0.7 MG/DL (ref 0–1.2)
BUN SERPL-MCNC: 12 MG/DL (ref 8–27)
BUN/CREAT SERPL: 13 (ref 10–24)
CALCIUM SERPL-MCNC: 9.4 MG/DL (ref 8.6–10.2)
CHLORIDE SERPL-SCNC: 102 MMOL/L (ref 96–106)
CO2 SERPL-SCNC: 27 MMOL/L (ref 20–29)
CREAT SERPL-MCNC: 0.93 MG/DL (ref 0.76–1.27)
EGFR: 87 ML/MIN/1.73
EST. AVERAGE GLUCOSE BLD GHB EST-MCNC: 143 MG/DL
GLOBULIN SER-MCNC: 2.2 G/DL (ref 1.5–4.5)
GLUCOSE SERPL-MCNC: 112 MG/DL (ref 70–99)
HBA1C MFR BLD: 6.6 % (ref 4.8–5.6)
POTASSIUM SERPL-SCNC: 4.8 MMOL/L (ref 3.5–5.2)
PROT SERPL-MCNC: 6.6 G/DL (ref 6–8.5)
SODIUM SERPL-SCNC: 141 MMOL/L (ref 134–144)

## 2023-12-21 ENCOUNTER — OFFICE VISIT (OUTPATIENT)
Dept: FAMILY MEDICINE CLINIC | Facility: CLINIC | Age: 73
End: 2023-12-21
Payer: MEDICARE

## 2023-12-21 VITALS
HEART RATE: 62 BPM | HEIGHT: 68 IN | DIASTOLIC BLOOD PRESSURE: 60 MMHG | WEIGHT: 196 LBS | SYSTOLIC BLOOD PRESSURE: 110 MMHG | OXYGEN SATURATION: 94 % | BODY MASS INDEX: 29.7 KG/M2 | RESPIRATION RATE: 18 BRPM | TEMPERATURE: 97.8 F

## 2023-12-21 DIAGNOSIS — I10 ESSENTIAL HYPERTENSION: ICD-10-CM

## 2023-12-21 DIAGNOSIS — E11.36 TYPE 2 DIABETES MELLITUS WITH DIABETIC CATARACT, WITHOUT LONG-TERM CURRENT USE OF INSULIN (HCC): Primary | ICD-10-CM

## 2023-12-21 DIAGNOSIS — E78.2 MIXED HYPERLIPIDEMIA: ICD-10-CM

## 2023-12-21 PROCEDURE — 99214 OFFICE O/P EST MOD 30 MIN: CPT | Performed by: FAMILY MEDICINE

## 2023-12-21 NOTE — PROGRESS NOTES
Name: Alfred Tavares      : 1950      MRN: [id number]  Encounter Provider: Yahaira Koehler DO  Encounter Date: 2023   Encounter department: [facility]    Assessment & Plan     1. Type 2 diabetes mellitus with diabetic cataract, without long-term current use of insulin (HCC)  Assessment & Plan:    Lab Results   Component Value Date    HGBA1C 6.6 (H) 12/15/2023     Diabetes control remains excellent  Continue metformin    Orders:  -     Hemoglobin A1C; Future; Expected date: 2024  -     Hemoglobin A1C    2. Mixed hyperlipidemia  Assessment & Plan:  Stable  Continue atorvastatin 40 mg daily    Orders:  -     Comprehensive metabolic panel; Future; Expected date: 2024  -     Lipid Panel with Direct LDL reflex; Future; Expected date: 2024  -     Comprehensive metabolic panel  -     Lipid Panel with Direct LDL reflex    3. Essential hypertension  Assessment & Plan:  Blood pressure is well-controlled  Continue metoprolol 12.5 mg daily    Orders:  -     CBC; Future; Expected date: 2024  -     Comprehensive metabolic panel; Future; Expected date: 2024  -     Lipid Panel with Direct LDL reflex; Future; Expected date: 2024  -     CBC  -     Comprehensive metabolic panel  -     Lipid Panel with Direct LDL reflex      Depression Screening and Follow-up Plan: Patient was screened for depression during today's encounter. They screened negative with a PHQ-2 score of 0.    Return in about 3 months (around 3/21/2024) for Next scheduled follow up.    Subjective      He is feeling well.  He is still watching his diet.  He is taking his medication daily.  He is tolerating it well.      Review of Systems    Current Outpatient Medications on File Prior to Visit   Medication Sig   • aspirin (ECOTRIN LOW STRENGTH) 81 mg EC tablet Take 1 tablet (81 mg total) by mouth daily   • atorvastatin (LIPITOR) 40 mg tablet TAKE ONE TABLET BY MOUTH EVERY DAY   • clopidogrel (PLAVIX) 75 mg  "tablet TAKE ONE TABLET BY MOUTH EVERY DAY   • Contour Next Test test strip TEST DAILY   • gabapentin (NEURONTIN) 100 mg capsule TAKE ONE CAPSULE BY MOUTH TWICE A DAY   • gabapentin (NEURONTIN) 400 mg capsule TAKE ONE CAPSULE BY MOUTH FOUR TIMES A DAY   • metFORMIN (GLUCOPHAGE) 500 mg tablet TAKE ONE TABLET BY MOUTH EVERY DAY WITH BREAKFAST (GENERIC FOR GLUCOPHAGE)   • metoprolol succinate (TOPROL-XL) 25 mg 24 hr tablet TAKE ONE TABLET BY MOUTH EVERY DAY AT BEDTIME   • Microlet Lancets MISC Use daily   • zinc gluconate 50 mg tablet Take 50 mg by mouth daily       Objective     /60   Pulse 62   Temp 97.8 °F (36.6 °C)   Resp 18   Ht 5' 8\" (1.727 m)   Wt 88.9 kg (196 lb)   SpO2 94%   BMI 29.80 kg/m²     Physical Exam  Vitals and nursing note reviewed.   Constitutional:       Appearance: He is well-developed.   HENT:      Head: Normocephalic and atraumatic.      Right Ear: Tympanic membrane and external ear normal.      Left Ear: Tympanic membrane and external ear normal.   Cardiovascular:      Rate and Rhythm: Normal rate and regular rhythm.      Heart sounds: Normal heart sounds. No murmur heard.  Pulmonary:      Effort: Pulmonary effort is normal. No respiratory distress.      Breath sounds: Normal breath sounds. No wheezing or rales.   Musculoskeletal:      Right lower leg: No edema.      Left lower leg: No edema.       Yahaira Koehler, DO    "

## 2023-12-21 NOTE — ASSESSMENT & PLAN NOTE
Lab Results   Component Value Date    HGBA1C 6.6 (H) 12/15/2023     Diabetes control remains excellent  Continue metformin

## 2024-01-30 DIAGNOSIS — G60.9 IDIOPATHIC PERIPHERAL NEUROPATHY: ICD-10-CM

## 2024-01-30 DIAGNOSIS — I25.10 CORONARY ARTERY DISEASE INVOLVING NATIVE CORONARY ARTERY OF NATIVE HEART WITHOUT ANGINA PECTORIS: ICD-10-CM

## 2024-01-30 DIAGNOSIS — E78.2 MIXED HYPERLIPIDEMIA: ICD-10-CM

## 2024-01-30 RX ORDER — GABAPENTIN 400 MG/1
CAPSULE ORAL
Qty: 360 CAPSULE | Refills: 1 | Status: SHIPPED | OUTPATIENT
Start: 2024-01-30

## 2024-01-30 RX ORDER — ATORVASTATIN CALCIUM 40 MG/1
TABLET, FILM COATED ORAL
Qty: 90 TABLET | Refills: 1 | Status: SHIPPED | OUTPATIENT
Start: 2024-01-30

## 2024-02-07 LAB
LEFT EYE DIABETIC RETINOPATHY: NORMAL
RIGHT EYE DIABETIC RETINOPATHY: NORMAL

## 2024-02-16 DIAGNOSIS — I21.9 MYOCARDIAL INFARCTION, UNSPECIFIED MI TYPE, UNSPECIFIED ARTERY (HCC): ICD-10-CM

## 2024-02-16 RX ORDER — CLOPIDOGREL BISULFATE 75 MG/1
TABLET ORAL
Qty: 90 TABLET | Refills: 1 | Status: SHIPPED | OUTPATIENT
Start: 2024-02-16

## 2024-03-23 LAB
ALBUMIN SERPL-MCNC: 4.4 G/DL (ref 3.8–4.8)
ALBUMIN/GLOB SERPL: 1.9 {RATIO} (ref 1.2–2.2)
ALP SERPL-CCNC: 75 IU/L (ref 44–121)
ALT SERPL-CCNC: 24 IU/L (ref 0–44)
AST SERPL-CCNC: 18 IU/L (ref 0–40)
BILIRUB SERPL-MCNC: 0.5 MG/DL (ref 0–1.2)
BUN SERPL-MCNC: 18 MG/DL (ref 8–27)
BUN/CREAT SERPL: 23 (ref 10–24)
CALCIUM SERPL-MCNC: 9.3 MG/DL (ref 8.6–10.2)
CHLORIDE SERPL-SCNC: 105 MMOL/L (ref 96–106)
CHOLEST SERPL-MCNC: 121 MG/DL (ref 100–199)
CO2 SERPL-SCNC: 26 MMOL/L (ref 20–29)
CREAT SERPL-MCNC: 0.77 MG/DL (ref 0.76–1.27)
EGFR: 95 ML/MIN/1.73
ERYTHROCYTE [DISTWIDTH] IN BLOOD BY AUTOMATED COUNT: 12.8 % (ref 11.6–15.4)
EST. AVERAGE GLUCOSE BLD GHB EST-MCNC: 151 MG/DL
GLOBULIN SER-MCNC: 2.3 G/DL (ref 1.5–4.5)
GLUCOSE SERPL-MCNC: 105 MG/DL (ref 70–99)
HBA1C MFR BLD: 6.9 % (ref 4.8–5.6)
HCT VFR BLD AUTO: 47.2 % (ref 37.5–51)
HDLC SERPL-MCNC: 49 MG/DL
HGB BLD-MCNC: 15.8 G/DL (ref 13–17.7)
LDLC SERPL CALC-MCNC: 59 MG/DL (ref 0–99)
LDLC/HDLC SERPL: 1.2 RATIO (ref 0–3.6)
MCH RBC QN AUTO: 29.2 PG (ref 26.6–33)
MCHC RBC AUTO-ENTMCNC: 33.5 G/DL (ref 31.5–35.7)
MCV RBC AUTO: 87 FL (ref 79–97)
MICRODELETION SYND BLD/T FISH: NORMAL
PLATELET # BLD AUTO: 204 X10E3/UL (ref 150–450)
POTASSIUM SERPL-SCNC: 5.1 MMOL/L (ref 3.5–5.2)
PROT SERPL-MCNC: 6.7 G/DL (ref 6–8.5)
RBC # BLD AUTO: 5.42 X10E6/UL (ref 4.14–5.8)
SL AMB VLDL CHOLESTEROL CALC: 13 MG/DL (ref 5–40)
SODIUM SERPL-SCNC: 144 MMOL/L (ref 134–144)
TRIGL SERPL-MCNC: 58 MG/DL (ref 0–149)
WBC # BLD AUTO: 7.8 X10E3/UL (ref 3.4–10.8)

## 2024-03-26 ENCOUNTER — OFFICE VISIT (OUTPATIENT)
Dept: FAMILY MEDICINE CLINIC | Facility: CLINIC | Age: 74
End: 2024-03-26
Payer: MEDICARE

## 2024-03-26 VITALS
TEMPERATURE: 97.7 F | HEART RATE: 56 BPM | BODY MASS INDEX: 30.25 KG/M2 | DIASTOLIC BLOOD PRESSURE: 78 MMHG | WEIGHT: 199.6 LBS | RESPIRATION RATE: 17 BRPM | SYSTOLIC BLOOD PRESSURE: 122 MMHG | HEIGHT: 68 IN

## 2024-03-26 DIAGNOSIS — I70.209 ATHEROSCLEROSIS OF ARTERIES OF EXTREMITIES (HCC): ICD-10-CM

## 2024-03-26 DIAGNOSIS — E11.36 TYPE 2 DIABETES MELLITUS WITH DIABETIC CATARACT, WITHOUT LONG-TERM CURRENT USE OF INSULIN (HCC): Primary | ICD-10-CM

## 2024-03-26 DIAGNOSIS — I10 ESSENTIAL HYPERTENSION: ICD-10-CM

## 2024-03-26 DIAGNOSIS — I11.0 HYPERTENSIVE HEART DISEASE WITH HEART FAILURE (HCC): ICD-10-CM

## 2024-03-26 PROCEDURE — 99214 OFFICE O/P EST MOD 30 MIN: CPT | Performed by: FAMILY MEDICINE

## 2024-03-26 PROCEDURE — G2211 COMPLEX E/M VISIT ADD ON: HCPCS | Performed by: FAMILY MEDICINE

## 2024-03-26 NOTE — ASSESSMENT & PLAN NOTE
Wt Readings from Last 3 Encounters:   03/26/24 90.5 kg (199 lb 9.6 oz)   12/21/23 88.9 kg (196 lb)   10/17/23 87.1 kg (192 lb)     Stable

## 2024-03-26 NOTE — PROGRESS NOTES
Name: Alfred Tavares      : 1950      MRN: [id number]  Encounter Provider: Yahaira Koehler DO  Encounter Date: 3/26/2024   Encounter department: [facility]    Assessment & Plan     1. Type 2 diabetes mellitus with diabetic cataract, without long-term current use of insulin (HCC)  Assessment & Plan:    Lab Results   Component Value Date    HGBA1C 6.9 (H) 2024     Well controlled       Orders:  -     Hemoglobin A1C; Future; Expected date: 2024  -     Hemoglobin A1C    2. Hypertensive heart disease with heart failure (HCC)  Assessment & Plan:  Wt Readings from Last 3 Encounters:   24 90.5 kg (199 lb 9.6 oz)   23 88.9 kg (196 lb)   10/17/23 87.1 kg (192 lb)     Stable           Orders:  -     Comprehensive metabolic panel; Future; Expected date: 2024  -     Lipid Panel with Direct LDL reflex; Future; Expected date: 2024  -     Comprehensive metabolic panel  -     Lipid Panel with Direct LDL reflex    3. Atherosclerosis of arteries of extremities (HCC)  Assessment & Plan:  Stable       4. Essential hypertension  Assessment & Plan:  Well controlled  Continue metoprolol     Orders:  -     CBC; Future; Expected date: 2024  -     Comprehensive metabolic panel; Future; Expected date: 2024  -     Lipid Panel with Direct LDL reflex; Future; Expected date: 2024  -     CBC  -     Comprehensive metabolic panel  -     Lipid Panel with Direct LDL reflex    Return in about 3 months (around 2024) for Next scheduled follow up.       Subjective      He thinks his sugar went up because he had been celebrating [employer] day  He is still watching his diet and staying active        Review of Systems    Current Outpatient Medications on File Prior to Visit   Medication Sig    aspirin (ECOTRIN LOW STRENGTH) 81 mg EC tablet Take 1 tablet (81 mg total) by mouth daily    atorvastatin (LIPITOR) 40 mg tablet TAKE ONE TABLET BY MOUTH EVERY DAY    clopidogrel (PLAVIX)  "75 mg tablet TAKE ONE TABLET BY MOUTH EVERY DAY    Contour Next Test test strip TEST DAILY    gabapentin (NEURONTIN) 100 mg capsule TAKE ONE CAPSULE BY MOUTH TWICE A DAY    gabapentin (NEURONTIN) 400 mg capsule TAKE ONE CAPSULE BY MOUTH FOUR TIMES A DAY    metFORMIN (GLUCOPHAGE) 500 mg tablet TAKE ONE TABLET BY MOUTH EVERY DAY WITH BREAKFAST (GENERIC FOR GLUCOPHAGE)    metoprolol succinate (TOPROL-XL) 25 mg 24 hr tablet TAKE ONE TABLET BY MOUTH EVERY DAY AT BEDTIME    Microlet Lancets MISC Use daily    zinc gluconate 50 mg tablet Take 50 mg by mouth daily       Objective     /78   Pulse 56   Temp 97.7 °F (36.5 °C)   Resp 17   Ht 5' 8\" (1.727 m)   Wt 90.5 kg (199 lb 9.6 oz)   BMI 30.35 kg/m²     Physical Exam  Vitals and nursing note reviewed.   Constitutional:       Appearance: He is well-developed.   HENT:      Head: Normocephalic and atraumatic.      Right Ear: Tympanic membrane and external ear normal.      Left Ear: Tympanic membrane and external ear normal.   Cardiovascular:      Rate and Rhythm: Normal rate and regular rhythm.      Heart sounds: Normal heart sounds. No murmur heard.  Pulmonary:      Effort: Pulmonary effort is normal. No respiratory distress.      Breath sounds: Normal breath sounds. No wheezing or rales.   Musculoskeletal:      Right lower leg: No edema.      Left lower leg: No edema.       Yahaira Koehler, DO    "

## 2024-04-25 DIAGNOSIS — G60.9 IDIOPATHIC PERIPHERAL NEUROPATHY: ICD-10-CM

## 2024-04-26 RX ORDER — GABAPENTIN 100 MG/1
CAPSULE ORAL
Qty: 180 CAPSULE | Refills: 1 | Status: SHIPPED | OUTPATIENT
Start: 2024-04-26

## 2024-05-14 DIAGNOSIS — E11.42 DIABETIC POLYNEUROPATHY ASSOCIATED WITH TYPE 2 DIABETES MELLITUS (HCC): ICD-10-CM

## 2024-05-29 ENCOUNTER — OFFICE VISIT (OUTPATIENT)
Dept: CARDIOLOGY CLINIC | Facility: CLINIC | Age: 74
End: 2024-05-29
Payer: MEDICARE

## 2024-05-29 VITALS
WEIGHT: 196 LBS | HEART RATE: 57 BPM | SYSTOLIC BLOOD PRESSURE: 126 MMHG | DIASTOLIC BLOOD PRESSURE: 80 MMHG | OXYGEN SATURATION: 98 % | HEIGHT: 68 IN | BODY MASS INDEX: 29.7 KG/M2

## 2024-05-29 DIAGNOSIS — I10 ESSENTIAL HYPERTENSION: ICD-10-CM

## 2024-05-29 DIAGNOSIS — I50.9 CONGESTIVE HEART FAILURE, UNSPECIFIED HF CHRONICITY, UNSPECIFIED HEART FAILURE TYPE (HCC): ICD-10-CM

## 2024-05-29 DIAGNOSIS — I25.10 CORONARY ARTERY DISEASE INVOLVING NATIVE CORONARY ARTERY OF NATIVE HEART WITHOUT ANGINA PECTORIS: ICD-10-CM

## 2024-05-29 DIAGNOSIS — I70.209 ATHEROSCLEROSIS OF ARTERIES OF EXTREMITIES (HCC): Primary | ICD-10-CM

## 2024-05-29 DIAGNOSIS — E87.5 HYPERKALEMIA: ICD-10-CM

## 2024-05-29 PROCEDURE — 99214 OFFICE O/P EST MOD 30 MIN: CPT | Performed by: INTERNAL MEDICINE

## 2024-05-29 PROCEDURE — 93000 ELECTROCARDIOGRAM COMPLETE: CPT | Performed by: INTERNAL MEDICINE

## 2024-05-29 NOTE — PROGRESS NOTES
Cardiology Followup     Alfred Tavares  [id number]  1950  [facility] [place] [id number] [facility] [id number]    Consult for: CAD    Interval History: Alfred Tavares is a 74 y.o. year old male  who is here for followup of CAD.  Since his last visit, he has been feeling well.  he denies any palpitations, chest pain, shortness of breath, LE edema, orthopnea or PND.   Diet is overall unchanged.  There has not been a significant change in weight.    Most recent blood work showed LDL of 59 mg/dL which is improved from previous levels.  His A1c was 6.6%.       Past Cardiac History:    In February 2012, he had a myocardial infarction where he felt a band like pain across the front of his chest.  No associated dyspnea.  He underwent PCI *2 to mid LAD with Xience 3.5mm * 12mm  and 4.0 mm * 12mm stents.         Past Medical History:   Diagnosis Date    Acquired ankle/foot deformity     last assessed: 05/30/2017    Acute myocardial infarction (HCC)     last assessed: 12/03/2013    Anemia     last assessed: 12/03/2013    Anxiety     last assessed: 12/03/2013    Cardiomyopathy (HCC)     last assessed: 12/03/2013    Congestive heart failure (CHF) (HCC)     onset: 02/29/2012    Coronary artery disease     Diabetes mellitus (HCC)     Dysesthesia     last assessed: 09/10/2014    Exposure to hepatitis     last assessed: 11/15/2016    Foot slap     last assessed: 03/17/2016    Hypertension     Myocardial infarction (HCC)     Palsy of left sciatic nerve     last assessed: 03/17/2016    Subconjunctival hemorrhage     last assessed: 03/03/2014    Xerosis cutis     last assessed: 10/25/2016     Social History     Socioeconomic History    Marital status:      Spouse name: Not on file    Number of children: Not on file    Years of education: Not on file    Highest education level: Not on file   Occupational History    Not on file   Tobacco Use     Smoking status: Never     Passive exposure: Never    Smokeless tobacco: Never   Vaping Use    Vaping status: Never Used   Substance and Sexual Activity    Alcohol use: No    Drug use: No    Sexual activity: Not on file   Other Topics Concern    Not on file   Social History Narrative    Not on file     Social Determinants of Health     Financial Resource Strain: Low Risk  (9/15/2023)    Overall Financial Resource Strain (CARDIA)     Difficulty of Paying Living Expenses: Not hard at all   Food Insecurity: Not on file   Transportation Needs: No Transportation Needs (9/15/2023)    PRAPARE - Transportation     Lack of Transportation (Medical): No     Lack of Transportation (Non-Medical): No   Physical Activity: Not on file   Stress: Not on file   Social Connections: Not on file   Intimate Partner Violence: Not on file   Housing Stability: Not on file      Family History   Problem Relation Age of Onset    Heart disease Mother         cardiac disorder    Leukemia Father     Ovarian cancer Sister      Past Surgical History:   Procedure Laterality Date    ANGIOPLASTY      2 coronary stents    CHOLECYSTECTOMY      COLONOSCOPY N/A 3/17/2016    Procedure: COLONOSCOPY;  Surgeon: Raleigh Escamilla MD;  Location: [facility];  Service:     HERNIA REPAIR      right inguinal        Current Outpatient Medications:     aspirin (ECOTRIN LOW STRENGTH) 81 mg EC tablet, Take 1 tablet (81 mg total) by mouth daily, Disp: 30 tablet, Rfl: 5    atorvastatin (LIPITOR) 40 mg tablet, TAKE ONE TABLET BY MOUTH EVERY DAY, Disp: 90 tablet, Rfl: 1    clopidogrel (PLAVIX) 75 mg tablet, TAKE ONE TABLET BY MOUTH EVERY DAY, Disp: 90 tablet, Rfl: 1    Contour Next Test test strip, TEST DAILY, Disp: 100 strip, Rfl: 3    gabapentin (NEURONTIN) 100 mg capsule, TAKE ONE CAPSULE BY MOUTH TWICE A DAY, Disp: 180 capsule, Rfl: 1    gabapentin (NEURONTIN) 400 mg capsule, TAKE ONE CAPSULE BY MOUTH FOUR TIMES A DAY, Disp: 360 capsule, Rfl: 1    metFORMIN (GLUCOPHAGE)  "500 mg tablet, TAKE ONE TABLET BY MOUTH EVERY DAY WITH BREAKFAST, Disp: 90 tablet, Rfl: 1    metoprolol succinate (TOPROL-XL) 25 mg 24 hr tablet, TAKE ONE TABLET BY MOUTH EVERY DAY AT BEDTIME, Disp: 90 tablet, Rfl: 1    Microlet Lancets MISC, Use daily, Disp: 100 each, Rfl: 3    zinc gluconate 50 mg tablet, Take 50 mg by mouth daily, Disp: , Rfl:   No Known Allergies      Review of Systems:  Review of Systems   Constitutional:  Negative for fatigue.   Respiratory:  Negative for shortness of breath.    Cardiovascular:  Negative for chest pain, palpitations and leg swelling.   Musculoskeletal:  Positive for arthralgias.   All other systems reviewed and are negative.      Physical Exam:  Vitals:    05/29/24 0754   BP: 126/80   BP Location: Right arm   Patient Position: Sitting   Cuff Size: Standard   Pulse: 57   SpO2: 98%   Weight: 88.9 kg (196 lb)   Height: 5' 8\" (1.727 m)     Physical Exam  Constitutional:       General: He is not in acute distress.     Appearance: Normal appearance. He is well-developed. He is not diaphoretic.   HENT:      Head: Normocephalic and atraumatic.   Eyes:      General: No scleral icterus.     Conjunctiva/sclera: Conjunctivae normal.      Pupils: Pupils are equal, round, and reactive to light.   Neck:      Thyroid: No thyromegaly.      Vascular: No JVD.   Cardiovascular:      Rate and Rhythm: Normal rate and regular rhythm.      Heart sounds: Normal heart sounds. No murmur heard.     No friction rub. No gallop.   Pulmonary:      Effort: Pulmonary effort is normal.      Breath sounds: Normal breath sounds.   Musculoskeletal:      Cervical back: Neck supple.      Right lower leg: No edema.   Skin:     General: Skin is warm and dry.      Findings: No erythema or rash.   Neurological:      General: No focal deficit present.      Mental Status: He is alert and oriented to person, place, and time. Mental status is at baseline.   Psychiatric:         Mood and Affect: Mood normal.         " Behavior: Behavior normal.         Thought Content: Thought content normal.         Judgment: Judgment normal.         Labs:  Lab Results   Component Value Date     12/16/2017    K 5.1 03/22/2024     03/22/2024    CO2 26 03/22/2024    BUN 18 03/22/2024    CREATININE 0.77 03/22/2024    CREATININE 0.97 12/16/2017    GLUCOSE 93 12/16/2017    CALCIUM 9.7 12/16/2017     Lab Results   Component Value Date    WBC 7.8 03/22/2024    WBC 8.3 12/09/2016    HGB 15.8 03/22/2024    HGB 16.0 12/09/2016    HCT 47.2 03/22/2024    HCT 48.4 12/09/2016    MCV 87 03/22/2024    MCV 89 12/09/2016     03/22/2024     12/09/2016     Lab Results   Component Value Date    CHOL 153 12/16/2017    TRIG 58 03/22/2024    HDL 49 03/22/2024    LDLDIRECT 80 10/14/2014     Imaging: No results found.  EKG (independently reviewed):  Sinus bradycardia at 60 beats per minute with nonspecific T-wave abnormalities    Discussion/Summary:  1. Coronary artery disease involving native coronary artery of native heart without angina pectoris  -  Last stress test was normal without any ischemia.  Good functional capacity and normal BP response.  - patient has been doing well with diet modification and has overall lost a large amount of weight.  Continue working on diet modification.     2. Essential hypertension  - Continue benazepril   - No proteinuria on last urine study.    3. Diabetic polyneuropathy associated with type 2 diabetes mellitus (HCC)  - Continue metformin    4. Mixed hyperlipidemia  - Last LDL was 59 mg/dL.  He is taking atorvastatin 40 mg daily.   Given history of CAD,  LDL goal should be less than 70.    - Discussed diet and weight loss.      5. History of myocardial infarction in adulthood  - patient wishes to remain on both aspirin and Plavix

## 2024-06-26 ENCOUNTER — RA CDI HCC (OUTPATIENT)
Dept: OTHER | Facility: HOSPITAL | Age: 74
End: 2024-06-26

## 2024-06-26 PROBLEM — U09.9 POST-ACUTE SEQUELAE OF COVID-19 (PASC): Status: RESOLVED | Noted: 2023-03-02 | Resolved: 2024-06-26

## 2024-06-26 NOTE — PROGRESS NOTES
HCC coding opportunities          Chart Reviewed number of suggestions sent to Provider: 1  E11. 42  Patients Insurance     Medicare Insurance: Medicare

## 2024-06-28 LAB
ALBUMIN SERPL-MCNC: 4.6 G/DL (ref 3.8–4.8)
ALP SERPL-CCNC: 80 IU/L (ref 44–121)
ALT SERPL-CCNC: 23 IU/L (ref 0–44)
AST SERPL-CCNC: 17 IU/L (ref 0–40)
BILIRUB SERPL-MCNC: 0.8 MG/DL (ref 0–1.2)
BUN SERPL-MCNC: 17 MG/DL (ref 8–27)
BUN/CREAT SERPL: 18 (ref 10–24)
CALCIUM SERPL-MCNC: 9.9 MG/DL (ref 8.6–10.2)
CHLORIDE SERPL-SCNC: 102 MMOL/L (ref 96–106)
CHOLEST SERPL-MCNC: 131 MG/DL (ref 100–199)
CO2 SERPL-SCNC: 27 MMOL/L (ref 20–29)
CREAT SERPL-MCNC: 0.95 MG/DL (ref 0.76–1.27)
EGFR: 85 ML/MIN/1.73
ERYTHROCYTE [DISTWIDTH] IN BLOOD BY AUTOMATED COUNT: 13.3 % (ref 11.6–15.4)
EST. AVERAGE GLUCOSE BLD GHB EST-MCNC: 148 MG/DL
GLOBULIN SER-MCNC: 2.4 G/DL (ref 1.5–4.5)
GLUCOSE SERPL-MCNC: 111 MG/DL (ref 70–99)
HBA1C MFR BLD: 6.8 % (ref 4.8–5.6)
HCT VFR BLD AUTO: 50.7 % (ref 37.5–51)
HDLC SERPL-MCNC: 49 MG/DL
HGB BLD-MCNC: 16.6 G/DL (ref 13–17.7)
LDLC SERPL CALC-MCNC: 64 MG/DL (ref 0–99)
LDLC/HDLC SERPL: 1.3 RATIO (ref 0–3.6)
MCH RBC QN AUTO: 29.5 PG (ref 26.6–33)
MCHC RBC AUTO-ENTMCNC: 32.7 G/DL (ref 31.5–35.7)
MCV RBC AUTO: 90 FL (ref 79–97)
MICRODELETION SYND BLD/T FISH: NORMAL
PLATELET # BLD AUTO: 238 X10E3/UL (ref 150–450)
POTASSIUM SERPL-SCNC: 5.1 MMOL/L (ref 3.5–5.2)
PROT SERPL-MCNC: 7 G/DL (ref 6–8.5)
RBC # BLD AUTO: 5.62 X10E6/UL (ref 4.14–5.8)
SL AMB VLDL CHOLESTEROL CALC: 18 MG/DL (ref 5–40)
SODIUM SERPL-SCNC: 143 MMOL/L (ref 134–144)
TRIGL SERPL-MCNC: 99 MG/DL (ref 0–149)
WBC # BLD AUTO: 8.9 X10E3/UL (ref 3.4–10.8)

## 2024-07-03 ENCOUNTER — OFFICE VISIT (OUTPATIENT)
Dept: FAMILY MEDICINE CLINIC | Facility: CLINIC | Age: 74
End: 2024-07-03
Payer: MEDICARE

## 2024-07-03 VITALS
DIASTOLIC BLOOD PRESSURE: 68 MMHG | RESPIRATION RATE: 22 BRPM | WEIGHT: 193 LBS | BODY MASS INDEX: 29.25 KG/M2 | SYSTOLIC BLOOD PRESSURE: 128 MMHG | HEIGHT: 68 IN | TEMPERATURE: 98.6 F | HEART RATE: 64 BPM

## 2024-07-03 DIAGNOSIS — I10 ESSENTIAL HYPERTENSION: ICD-10-CM

## 2024-07-03 DIAGNOSIS — Z79.899 ENCOUNTER FOR LONG-TERM CURRENT USE OF MEDICATION: ICD-10-CM

## 2024-07-03 DIAGNOSIS — E11.36 TYPE 2 DIABETES MELLITUS WITH DIABETIC CATARACT, WITHOUT LONG-TERM CURRENT USE OF INSULIN (HCC): Primary | ICD-10-CM

## 2024-07-03 DIAGNOSIS — G60.9 IDIOPATHIC PERIPHERAL NEUROPATHY: ICD-10-CM

## 2024-07-03 DIAGNOSIS — E78.2 MIXED HYPERLIPIDEMIA: ICD-10-CM

## 2024-07-03 PROCEDURE — G2211 COMPLEX E/M VISIT ADD ON: HCPCS | Performed by: FAMILY MEDICINE

## 2024-07-03 PROCEDURE — 99214 OFFICE O/P EST MOD 30 MIN: CPT | Performed by: FAMILY MEDICINE

## 2024-07-03 RX ORDER — GABAPENTIN 400 MG/1
400 CAPSULE ORAL 4 TIMES DAILY
Qty: 360 CAPSULE | Refills: 3 | Status: SHIPPED | OUTPATIENT
Start: 2024-07-03

## 2024-07-03 NOTE — PROGRESS NOTES
Ambulatory Visit  Name: Alfred Tavares      : 1950      MRN: [id number]  Encounter Provider: Yahaira Koehler DO  Encounter Date: 7/3/2024   Encounter department: [facility]    Assessment & Plan   1. Type 2 diabetes mellitus with diabetic cataract, without long-term current use of insulin (HCC)  Assessment & Plan:    Lab Results   Component Value Date    HGBA1C 6.8 (H) 2024   Diabetes remains well-controlled  Continue metformin 500 mg daily with breakfast  Orders:  -     Hemoglobin A1C; Future; Expected date: 2024  -     Albumin / creatinine urine ratio; Future  -     Hemoglobin A1C  -     Albumin / creatinine urine ratio  2. Essential hypertension  Assessment & Plan:  Blood pressure is well-controlled  Continue metoprolol 25 mg daily  Orders:  -     CBC; Future; Expected date: 2024  -     Comprehensive metabolic panel; Future; Expected date: 2024  -     Lipid Panel with Direct LDL reflex; Future; Expected date: 2024  -     CBC  -     Comprehensive metabolic panel  -     Lipid Panel with Direct LDL reflex  3. Mixed hyperlipidemia  Assessment & Plan:  Stable  Continue atorvastatin 40 mg a day   Orders:  -     Comprehensive metabolic panel; Future; Expected date: 2024  -     Lipid Panel with Direct LDL reflex; Future; Expected date: 2024  -     Comprehensive metabolic panel  -     Lipid Panel with Direct LDL reflex  4. Idiopathic peripheral neuropathy  Assessment & Plan:  Unchanged  Also follows with podiatrist  Orders:  -     gabapentin (NEURONTIN) 400 mg capsule; Take 1 capsule (400 mg total) by mouth 4 (four) times a day  5. Encounter for long-term current use of medication  -     Vitamin B12; Future; Expected date: 2024  -     Vitamin B12       History of Present Illness     He has had a lot of stress with the family of his lady friend.  He continues to watch his diet and stay active.        Review of Systems    Objective     /68   Pulse 64   " Temp 98.6 °F (37 °C) (Tympanic)   Resp 22   Ht 5' 8\" (1.727 m)   Wt 87.5 kg (193 lb)   BMI 29.35 kg/m²     Physical Exam  Vitals and nursing note reviewed.   Constitutional:       General: He is not in acute distress.     Appearance: He is well-developed.   HENT:      Right Ear: Tympanic membrane normal.      Left Ear: Tympanic membrane normal.   Cardiovascular:      Rate and Rhythm: Normal rate and regular rhythm.      Pulses: no weak pulses.           Dorsalis pedis pulses are 1+ on the right side and 1+ on the left side.        Posterior tibial pulses are 1+ on the right side and 1+ on the left side.      Heart sounds: No murmur heard.  Pulmonary:      Effort: Pulmonary effort is normal. No respiratory distress.      Breath sounds: Normal breath sounds.   Feet:      Right foot:      Skin integrity: No ulcer, skin breakdown, erythema, warmth, callus or dry skin.      Left foot:      Skin integrity: No ulcer, skin breakdown, erythema, warmth, callus or dry skin.   Neurological:      Mental Status: He is alert.   Psychiatric:         Mood and Affect: Mood normal.      Patient's shoes and socks removed.    Right Foot/Ankle   Right Foot Inspection  Skin Exam: skin normal. Skin not intact, no dry skin, no warmth, no callus, no erythema, no maceration, no abnormal color, no pre-ulcer, no ulcer and no callus.     Toe Exam: ROM and strength within normal limits.     Sensory   Monofilament testing: diminished    Vascular  Capillary refills: < 3 seconds  The right DP pulse is 1+. The right PT pulse is 1+.     Left Foot/Ankle  Left Foot Inspection  Skin Exam: skin normal. Skin not intact, no dry skin, no warmth, no erythema, no maceration, normal color, no pre-ulcer, no ulcer and no callus.     Toe Exam: ROM and strength within normal limits.     Sensory   Monofilament testing: intact    Vascular  Capillary refills: < 3 seconds  The left DP pulse is 1+. The left PT pulse is 1+.     Assign Risk Category  No deformity " present  No loss of protective sensation  No weak pulses  Risk: 0      Administrative Statements

## 2024-07-03 NOTE — ASSESSMENT & PLAN NOTE
Lab Results   Component Value Date    HGBA1C 6.8 (H) 06/27/2024   Diabetes remains well-controlled  Continue metformin 500 mg daily with breakfast

## 2024-08-06 DIAGNOSIS — E78.2 MIXED HYPERLIPIDEMIA: ICD-10-CM

## 2024-08-06 DIAGNOSIS — I25.10 CORONARY ARTERY DISEASE INVOLVING NATIVE CORONARY ARTERY OF NATIVE HEART WITHOUT ANGINA PECTORIS: ICD-10-CM

## 2024-08-06 RX ORDER — ATORVASTATIN CALCIUM 40 MG/1
TABLET, FILM COATED ORAL
Qty: 100 TABLET | Refills: 1 | Status: SHIPPED | OUTPATIENT
Start: 2024-08-06

## 2024-08-12 DIAGNOSIS — I21.9 MYOCARDIAL INFARCTION, UNSPECIFIED MI TYPE, UNSPECIFIED ARTERY (HCC): ICD-10-CM

## 2024-08-13 RX ORDER — CLOPIDOGREL BISULFATE 75 MG/1
TABLET ORAL
Qty: 90 TABLET | Refills: 1 | Status: SHIPPED | OUTPATIENT
Start: 2024-08-13

## 2024-09-06 ENCOUNTER — TELEPHONE (OUTPATIENT)
Age: 74
End: 2024-09-06

## 2024-09-06 DIAGNOSIS — Z13.6 SCREENING FOR CARDIOVASCULAR CONDITION: Primary | ICD-10-CM

## 2024-09-06 NOTE — TELEPHONE ENCOUNTER
Pt stated he is not feeling well. Symptoms: no energy started 1 week ago.       Please have PCP contact patient and advise what to do next.       Thank you for your assistance.

## 2024-09-06 NOTE — TELEPHONE ENCOUNTER
9/6/2024 2:22 PM returned call to Alfred    For the past 7-10 days he has had no energy.  His breathing has been normal.  His legs are feeling tired. He feels better when he rests.    Vascular screening test ordered  Yahaira Koehler,

## 2024-09-06 NOTE — TELEPHONE ENCOUNTER
I spoke to the patient, he stated that he has no energy in his arms and legs. He would like Dr Koehler to call him to discuss this. I informed him Dr Koehler will most likely want to see him in the office to be evaluated. He declined an appointment at this time and asked me to forward this to Dr Koehler and ask if she will give him a call today.     Shelly Madison LPN

## 2024-10-05 LAB
ALBUMIN SERPL-MCNC: 4.5 G/DL (ref 3.8–4.8)
ALBUMIN/CREAT UR: 5 MG/G CREAT (ref 0–29)
ALP SERPL-CCNC: 71 IU/L (ref 44–121)
ALT SERPL-CCNC: 20 IU/L (ref 0–44)
AST SERPL-CCNC: 17 IU/L (ref 0–40)
BILIRUB SERPL-MCNC: 1 MG/DL (ref 0–1.2)
BUN SERPL-MCNC: 20 MG/DL (ref 8–27)
BUN/CREAT SERPL: 19 (ref 10–24)
CALCIUM SERPL-MCNC: 9.5 MG/DL (ref 8.6–10.2)
CHLORIDE SERPL-SCNC: 102 MMOL/L (ref 96–106)
CHOLEST SERPL-MCNC: 143 MG/DL (ref 100–199)
CO2 SERPL-SCNC: 23 MMOL/L (ref 20–29)
CREAT SERPL-MCNC: 1.06 MG/DL (ref 0.76–1.27)
CREAT UR-MCNC: 69.2 MG/DL
EGFR: 74 ML/MIN/1.73
ERYTHROCYTE [DISTWIDTH] IN BLOOD BY AUTOMATED COUNT: 12.7 % (ref 11.6–15.4)
EST. AVERAGE GLUCOSE BLD GHB EST-MCNC: 151 MG/DL
GLOBULIN SER-MCNC: 2.6 G/DL (ref 1.5–4.5)
GLUCOSE SERPL-MCNC: 105 MG/DL (ref 70–99)
HBA1C MFR BLD: 6.9 % (ref 4.8–5.6)
HCT VFR BLD AUTO: 49.9 % (ref 37.5–51)
HDLC SERPL-MCNC: 56 MG/DL
HGB BLD-MCNC: 16 G/DL (ref 13–17.7)
LDLC SERPL CALC-MCNC: 73 MG/DL (ref 0–99)
LDLC/HDLC SERPL: 1.3 RATIO (ref 0–3.6)
MCH RBC QN AUTO: 28.2 PG (ref 26.6–33)
MCHC RBC AUTO-ENTMCNC: 32.1 G/DL (ref 31.5–35.7)
MCV RBC AUTO: 88 FL (ref 79–97)
MICROALBUMIN UR-MCNC: 3.2 UG/ML
MICRODELETION SYND BLD/T FISH: NORMAL
PLATELET # BLD AUTO: 215 X10E3/UL (ref 150–450)
POTASSIUM SERPL-SCNC: 4.9 MMOL/L (ref 3.5–5.2)
PROT SERPL-MCNC: 7.1 G/DL (ref 6–8.5)
RBC # BLD AUTO: 5.67 X10E6/UL (ref 4.14–5.8)
SL AMB VLDL CHOLESTEROL CALC: 14 MG/DL (ref 5–40)
SODIUM SERPL-SCNC: 140 MMOL/L (ref 134–144)
TRIGL SERPL-MCNC: 69 MG/DL (ref 0–149)
VIT B12 SERPL-MCNC: 425 PG/ML (ref 232–1245)
WBC # BLD AUTO: 8.1 X10E3/UL (ref 3.4–10.8)

## 2024-10-09 PROBLEM — E87.5 HYPERKALEMIA: Status: RESOLVED | Noted: 2019-09-26 | Resolved: 2024-10-09

## 2024-10-10 ENCOUNTER — TELEPHONE (OUTPATIENT)
Dept: FAMILY MEDICINE CLINIC | Facility: CLINIC | Age: 74
End: 2024-10-10

## 2024-10-10 ENCOUNTER — OFFICE VISIT (OUTPATIENT)
Dept: FAMILY MEDICINE CLINIC | Facility: CLINIC | Age: 74
End: 2024-10-10
Payer: MEDICARE

## 2024-10-10 VITALS
OXYGEN SATURATION: 96 % | BODY MASS INDEX: 30.46 KG/M2 | TEMPERATURE: 98 F | DIASTOLIC BLOOD PRESSURE: 76 MMHG | WEIGHT: 201 LBS | HEART RATE: 95 BPM | SYSTOLIC BLOOD PRESSURE: 122 MMHG | HEIGHT: 68 IN | RESPIRATION RATE: 18 BRPM

## 2024-10-10 DIAGNOSIS — E78.2 MIXED HYPERLIPIDEMIA: ICD-10-CM

## 2024-10-10 DIAGNOSIS — I10 ESSENTIAL HYPERTENSION: ICD-10-CM

## 2024-10-10 DIAGNOSIS — E11.36 TYPE 2 DIABETES MELLITUS WITH DIABETIC CATARACT, WITHOUT LONG-TERM CURRENT USE OF INSULIN (HCC): ICD-10-CM

## 2024-10-10 DIAGNOSIS — Z00.00 MEDICARE ANNUAL WELLNESS VISIT, SUBSEQUENT: Primary | ICD-10-CM

## 2024-10-10 DIAGNOSIS — Z12.5 SCREENING PSA (PROSTATE SPECIFIC ANTIGEN): ICD-10-CM

## 2024-10-10 PROCEDURE — G0439 PPPS, SUBSEQ VISIT: HCPCS | Performed by: FAMILY MEDICINE

## 2024-10-10 PROCEDURE — 99214 OFFICE O/P EST MOD 30 MIN: CPT | Performed by: FAMILY MEDICINE

## 2024-10-10 NOTE — ASSESSMENT & PLAN NOTE
Blood pressure is at goal  Continue metoprolol 25 mg daily  Orders:    CBC; Future    Comprehensive metabolic panel; Future    Lipid Panel with Direct LDL reflex; Future    CBC    Comprehensive metabolic panel    Lipid Panel with Direct LDL reflex

## 2024-10-10 NOTE — TELEPHONE ENCOUNTER
----- Message from Yahaira Koehler DO sent at 10/10/2024  8:49 AM EDT -----  Patient had RSV vaccine at [facility]  Please call for details  Thank you,  Dr. Koehler

## 2024-10-10 NOTE — PROGRESS NOTES
Ambulatory Visit  Name: Alfred Tavares      : 1950      MRN: [id number]  Encounter Provider: Yahaira Koehler DO  Encounter Date: 10/10/2024   Encounter department: [facility]    Assessment & Plan  Medicare annual wellness visit, subsequent         Type 2 diabetes mellitus with diabetic cataract, without long-term current use of insulin (HCC)  Well-controlled  Continue metformin 500 mg daily  Lab Results   Component Value Date    HGBA1C 6.9 (H) 10/04/2024       Orders:    Hemoglobin A1C; Future    Hemoglobin A1C    Essential hypertension  Blood pressure is at goal  Continue metoprolol 25 mg daily  Orders:    CBC; Future    Comprehensive metabolic panel; Future    Lipid Panel with Direct LDL reflex; Future    CBC    Comprehensive metabolic panel    Lipid Panel with Direct LDL reflex    Mixed hyperlipidemia  Stable  Continue atorvastatin 40 mg daily  Orders:    Comprehensive metabolic panel; Future    Lipid Panel with Direct LDL reflex; Future    Comprehensive metabolic panel    Lipid Panel with Direct LDL reflex    Screening PSA (prostate specific antigen)    Orders:    PSA Total (Reflex To Free); Future    PSA Total (Reflex To Free)       Preventive health issues were discussed with patient, and age appropriate screening tests were ordered as noted in patient's After Visit Summary. Personalized health advice and appropriate referrals for health education or preventive services given if needed, as noted in patient's After Visit Summary.    Return in about 3 months (around 1/10/2025) for Diabetic follow up.    History of Present Illness     He did get his RSV vaccine done.  He has been feeling more tired since his vaccines.  He does take some more motivation to get going with things he has to do.  It has not stopped him from doing anything.  He does have a follow-up appointment next month with his cardiologist.  He continues to be good about his diet.       Patient Care Team:  Yahaira Koehler DO as PCP  - General  MD Vlad Wilkerson OD Laura Kropf, DO Manoj Mittal, MD as Endoscopist  Shadi Reyez DO (Cardiology)    Review of Systems  Medical History Reviewed by provider this encounter:  Tobacco  Allergies  Meds  Problems  Med Hx  Surg Hx  Fam Hx       Annual Wellness Visit Questionnaire   Alfred is here for his Subsequent Wellness visit.     Health Risk Assessment:   Patient rates overall health as very good. Patient feels that their physical health rating is same. Patient is satisfied with their life. Eyesight was rated as same. Hearing was rated as same. Patient feels that their emotional and mental health rating is same. Patients states they are never, rarely angry. Patient states they are sometimes unusually tired/fatigued. Pain experienced in the last 7 days has been none. Patient states that he has experienced no weight loss or gain in last 6 months.     Depression Screening:   PHQ-2 Score: 0      Fall Risk Screening:   In the past year, patient has experienced: no history of falling in past year      Home Safety:  Patient does not have trouble with stairs inside or outside of their home. Patient has working smoke alarms and has working carbon monoxide detector. Home safety hazards include: none.     Nutrition:   Current diet is Regular.     Medications:   Patient is not currently taking any over-the-counter supplements. Patient is able to manage medications.     Activities of Daily Living (ADLs)/Instrumental Activities of Daily Living (IADLs):   Walk and transfer into and out of bed and chair?: Yes  Dress and groom yourself?: Yes    Bathe or shower yourself?: Yes    Feed yourself? Yes  Do your laundry/housekeeping?: Yes  Manage your money, pay your bills and track your expenses?: Yes  Make your own meals?: Yes    Do your own shopping?: Yes    Previous Hospitalizations:   Any hospitalizations or ED visits within the last 12 months?: No      Advance Care Planning:   Living will: Yes     Durable POA for healthcare: Yes    Advanced directive: Yes    Advanced directive counseling given: Yes    End of Life Decisions reviewed with patient: Yes    Provider agrees with end of life decisions: Yes      Cognitive Screening:   Provider or family/friend/caregiver concerned regarding cognition?: No    PREVENTIVE SCREENINGS      Cardiovascular Screening:    General: Screening Not Indicated and History Lipid Disorder      Diabetes Screening:     General: Screening Not Indicated and History Diabetes      Colorectal Cancer Screening:     General: Screening Current      Prostate Cancer Screening:    General: Risks and Benefits Discussed    Due for: PSA      Osteoporosis Screening:    General: Screening Not Indicated      Abdominal Aortic Aneurysm (AAA) Screening:    Risk factors include: age between 65-76 yo        General: Screening Not Indicated      Lung Cancer Screening:     General: Screening Not Indicated      Hepatitis C Screening:    General: Screening Current    Screening, Brief Intervention, and Referral to Treatment (SBIRT)    Screening  Typical number of drinks in a day: 0  Typical number of drinks in a week: 0  Interpretation: Low risk drinking behavior.    AUDIT-C Screenin) How often did you have a drink containing alcohol in the past year? never  2) How many drinks did you have on a typical day when you were drinking in the past year? 0  3) How often did you have 6 or more drinks on one occasion in the past year? never    AUDIT-C Score: 0  Interpretation: Score 0-3 (male): Negative screen for alcohol misuse    Single Item Drug Screening:  How often have you used an illegal drug (including marijuana) or a prescription medication for non-medical reasons in the past year? never    Single Item Drug Screen Score: 0  Interpretation: Negative screen for possible drug use disorder    Brief Intervention  Alcohol & drug use screenings were reviewed. No concerns regarding substance use disorder  "identified.     Social Determinants of Health     Financial Resource Strain: Low Risk  (9/15/2023)    Overall Financial Resource Strain (CARDIA)     Difficulty of Paying Living Expenses: Not hard at all   Food Insecurity: No Food Insecurity (10/10/2024)    Hunger Vital Sign     Worried About Running Out of Food in the Last Year: Never true     Ran Out of Food in the Last Year: Never true   Transportation Needs: No Transportation Needs (10/10/2024)    PRAPARE - Transportation     Lack of Transportation (Medical): No     Lack of Transportation (Non-Medical): No   Housing Stability: Unknown (10/10/2024)    Housing Stability Vital Sign     Unable to Pay for Housing in the Last Year: No     Homeless in the Last Year: No   Utilities: Not At Risk (10/10/2024)    [facility] Utilities     Threatened with loss of utilities: No     No results found.    Objective     /76   Pulse 95   Temp 98 °F (36.7 °C)   Resp 18   Ht 5' 8\" (1.727 m)   Wt 91.2 kg (201 lb)   SpO2 96%   BMI 30.56 kg/m²     Physical Exam  Vitals reviewed.   Constitutional:       Appearance: He is well-developed.   HENT:      Head: Normocephalic and atraumatic.      Right Ear: External ear normal.      Left Ear: External ear normal.   Cardiovascular:      Rate and Rhythm: Normal rate and regular rhythm.      Heart sounds: Normal heart sounds. No murmur heard.  Pulmonary:      Effort: Pulmonary effort is normal. No respiratory distress.      Breath sounds: Normal breath sounds. No wheezing.   Neurological:      Mental Status: He is alert.         "

## 2024-10-10 NOTE — PATIENT INSTRUCTIONS
Medicare Preventive Visit Patient Instructions  Thank you for completing your Welcome to Medicare Visit or Medicare Annual Wellness Visit today. Your next wellness visit will be due in one year (10/11/2025).  The screening/preventive services that you may require over the next 5-10 years are detailed below. Some tests may not apply to you based off risk factors and/or age. Screening tests ordered at today's visit but not completed yet may show as past due. Also, please note that scanned in results may not display below.  Preventive Screenings:  Service Recommendations Previous Testing/Comments   Colorectal Cancer Screening  Colonoscopy    Fecal Occult Blood Test (FOBT)/Fecal Immunochemical Test (FIT)  Fecal DNA/Cologuard Test  Flexible Sigmoidoscopy Age: 45-75 years old   Colonoscopy: every 10 years (May be performed more frequently if at higher risk)  OR  FOBT/FIT: every 1 year  OR  Cologuard: every 3 years  OR  Sigmoidoscopy: every 5 years  Screening may be recommended earlier than age 45 if at higher risk for colorectal cancer. Also, an individualized decision between you and your healthcare provider will decide whether screening between the ages of 76-85 would be appropriate. Colonoscopy: 04/27/2021  FOBT/FIT: Not on file  Cologuard: Not on file  Sigmoidoscopy: Not on file    Screening Current     Prostate Cancer Screening Individualized decision between patient and health care provider in men between ages of 55-69   Medicare will cover every 12 months beginning on the day after your 50th birthday PSA: 1.4 ng/mL           Hepatitis C Screening Once for adults born between 1945 and 1965  More frequently in patients at high risk for Hepatitis C Hep C Antibody: 06/22/2017    Screening Current   Diabetes Screening 1-2 times per year if you're at risk for diabetes or have pre-diabetes Fasting glucose: No results in last 5 years (No results in last 5 years)  A1C: 6.9 % (10/4/2024)  Screening Not Indicated  History  Diabetes   Cholesterol Screening Once every 5 years if you don't have a lipid disorder. May order more often based on risk factors. Lipid panel: 10/04/2024  Screening Not Indicated  History Lipid Disorder      Other Preventive Screenings Covered by Medicare:  Abdominal Aortic Aneurysm (AAA) Screening: covered once if your at risk. You're considered to be at risk if you have a family history of AAA or a male between the age of 65-75 who smoking at least 100 cigarettes in your lifetime.  Lung Cancer Screening: covers low dose CT scan once per year if you meet all of the following conditions: (1) Age 55-77; (2) No signs or symptoms of lung cancer; (3) Current smoker or have quit smoking within the last 15 years; (4) You have a tobacco smoking history of at least 20 pack years (packs per day x number of years you smoked); (5) You get a written order from a healthcare provider.  Glaucoma Screening: covered annually if you're considered high risk: (1) You have diabetes OR (2) Family history of glaucoma OR (3)  aged 50 and older OR (4)  American aged 65 and older  Osteoporosis Screening: covered every 2 years if you meet one of the following conditions: (1) Have a vertebral abnormality; (2) On glucocorticoid therapy for more than 3 months; (3) Have primary hyperparathyroidism; (4) On osteoporosis medications and need to assess response to drug therapy.  HIV Screening: covered annually if you're between the age of 15-65. Also covered annually if you are younger than 15 and older than 65 with risk factors for HIV infection. For pregnant patients, it is covered up to 3 times per pregnancy.    Immunizations:  Immunization Recommendations   Influenza Vaccine Annual influenza vaccination during flu season is recommended for all persons aged >= 6 months who do not have contraindications   Pneumococcal Vaccine   * Pneumococcal conjugate vaccine = PCV13 (Prevnar 13), PCV15 (Vaxneuvance), PCV20 (Prevnar  20)  * Pneumococcal polysaccharide vaccine = PPSV23 (Pneumovax) Adults 19-63 yo with certain risk factors or if 65+ yo  If never received any pneumonia vaccine: recommend Prevnar 20 (PCV20)  Give PCV20 if previously received 1 dose of PCV13 or PPSV23   Hepatitis B Vaccine 3 dose series if at intermediate or high risk (ex: diabetes, end stage renal disease, liver disease)   Respiratory syncytial virus (RSV) Vaccine - COVERED BY MEDICARE PART D  * RSVPreF3 (Arexvy) CDC recommends that adults 60 years of age and older may receive a single dose of RSV vaccine using shared clinical decision-making (SCDM)   Tetanus (Td) Vaccine - COST NOT COVERED BY MEDICARE PART B Following completion of primary series, a booster dose should be given every 10 years to maintain immunity against tetanus. Td may also be given as tetanus wound prophylaxis.   Tdap Vaccine - COST NOT COVERED BY MEDICARE PART B Recommended at least once for all adults. For pregnant patients, recommended with each pregnancy.   Shingles Vaccine (Shingrix) - COST NOT COVERED BY MEDICARE PART B  2 shot series recommended in those 19 years and older who have or will have weakened immune systems or those 50 years and older     Health Maintenance Due:      Topic Date Due   • Colorectal Cancer Screening  04/27/2026   • Hepatitis C Screening  Completed     Immunizations Due:  There are no preventive care reminders to display for this patient.  Advance Directives   What are advance directives?  Advance directives are legal documents that state your wishes and plans for medical care. These plans are made ahead of time in case you lose your ability to make decisions for yourself. Advance directives can apply to any medical decision, such as the treatments you want, and if you want to donate organs.   What are the types of advance directives?  There are many types of advance directives, and each state has rules about how to use them. You may choose a combination of any of  the following:  Living will:  This is a written record of the treatment you want. You can also choose which treatments you do not want, which to limit, and which to stop at a certain time. This includes surgery, medicine, IV fluid, and tube feedings.   Durable power of  for healthcare (DPAHC):  This is a written record that states who you want to make healthcare choices for you when you are unable to make them for yourself. This person, called a proxy, is usually a family member or a friend. You may choose more than 1 proxy.  Do not resuscitate (DNR) order:  A DNR order is used in case your heart stops beating or you stop breathing. It is a request not to have certain forms of treatment, such as CPR. A DNR order may be included in other types of advance directives.  Medical directive:  This covers the care that you want if you are in a coma, near death, or unable to make decisions for yourself. You can list the treatments you want for each condition. Treatment may include pain medicine, surgery, blood transfusions, dialysis, IV or tube feedings, and a ventilator (breathing machine).  Values history:  This document has questions about your views, beliefs, and how you feel and think about life. This information can help others choose the care that you would choose.  Why are advance directives important?  An advance directive helps you control your care. Although spoken wishes may be used, it is better to have your wishes written down. Spoken wishes can be misunderstood, or not followed. Treatments may be given even if you do not want them. An advance directive may make it easier for your family to make difficult choices about your care.   Weight Management   Why it is important to manage your weight:  Being overweight increases your risk of health conditions such as heart disease, high blood pressure, type 2 diabetes, and certain types of cancer. It can also increase your risk for osteoarthritis, sleep apnea,  and other respiratory problems. Aim for a slow, steady weight loss. Even a small amount of weight loss can lower your risk of health problems.  How to lose weight safely:  A safe and healthy way to lose weight is to eat fewer calories and get regular exercise. You can lose up about 1 pound a week by decreasing the number of calories you eat by 500 calories each day.   Healthy meal plan for weight management:  A healthy meal plan includes a variety of foods, contains fewer calories, and helps you stay healthy. A healthy meal plan includes the following:  Eat whole-grain foods more often.  A healthy meal plan should contain fiber. Fiber is the part of grains, fruits, and vegetables that is not broken down by your body. Whole-grain foods are healthy and provide extra fiber in your diet. Some examples of whole-grain foods are whole-wheat breads and pastas, oatmeal, brown rice, and bulgur.  Eat a variety of vegetables every day.  Include dark, leafy greens such as spinach, kale, fallon greens, and mustard greens. Eat yellow and orange vegetables such as carrots, sweet potatoes, and winter squash.   Eat a variety of fruits every day.  Choose fresh or canned fruit (canned in its own juice or light syrup) instead of juice. Fruit juice has very little or no fiber.  Eat low-fat dairy foods.  Drink fat-free (skim) milk or 1% milk. Eat fat-free yogurt and low-fat cottage cheese. Try low-fat cheeses such as mozzarella and other reduced-fat cheeses.  Choose meat and other protein foods that are low in fat.  Choose beans or other legumes such as split peas or lentils. Choose fish, skinless poultry (chicken or turkey), or lean cuts of red meat (beef or pork). Before you cook meat or poultry, cut off any visible fat.   Use less fat and oil.  Try baking foods instead of frying them. Add less fat, such as margarine, sour cream, regular salad dressing and mayonnaise to foods. Eat fewer high-fat foods. Some examples of high-fat foods  include french fries, doughnuts, ice cream, and cakes.  Eat fewer sweets.  Limit foods and drinks that are high in sugar. This includes candy, cookies, regular soda, and sweetened drinks.  Exercise:  Exercise at least 30 minutes per day on most days of the week. Some examples of exercise include walking, biking, dancing, and swimming. You can also fit in more physical activity by taking the stairs instead of the elevator or parking farther away from stores. Ask your healthcare provider about the best exercise plan for you.      © Copyright [employer] 2018 Information is for End User's use only and may not be sold, redistributed or otherwise used for commercial purposes. All illustrations and images included in CareNotes® are the copyrighted property of [employer] or [employer]

## 2024-10-10 NOTE — ASSESSMENT & PLAN NOTE
Well-controlled  Continue metformin 500 mg daily  Lab Results   Component Value Date    HGBA1C 6.9 (H) 10/04/2024       Orders:    Hemoglobin A1C; Future    Hemoglobin A1C

## 2024-10-10 NOTE — ASSESSMENT & PLAN NOTE
Stable  Continue atorvastatin 40 mg daily  Orders:    Comprehensive metabolic panel; Future    Lipid Panel with Direct LDL reflex; Future    Comprehensive metabolic panel    Lipid Panel with Direct LDL reflex

## 2024-10-10 NOTE — TELEPHONE ENCOUNTER
Called Andres. Date of 10/27/23 RSV vaccine was given. It has already been entered into chart  Leatha Leach MA

## 2024-10-16 ENCOUNTER — TELEPHONE (OUTPATIENT)
Age: 74
End: 2024-10-16

## 2024-10-16 NOTE — TELEPHONE ENCOUNTER
The patient called he received a letter from [facility] saying there was a data breach back in February 2024   he is going to check with his insurance company directly

## 2024-10-28 DIAGNOSIS — E11.42 DIABETIC POLYNEUROPATHY ASSOCIATED WITH TYPE 2 DIABETES MELLITUS (HCC): ICD-10-CM

## 2024-10-28 DIAGNOSIS — G60.9 IDIOPATHIC PERIPHERAL NEUROPATHY: ICD-10-CM

## 2024-10-28 DIAGNOSIS — I10 ESSENTIAL HYPERTENSION: ICD-10-CM

## 2024-10-29 RX ORDER — METOPROLOL SUCCINATE 25 MG/1
TABLET, EXTENDED RELEASE ORAL
Qty: 90 TABLET | Refills: 1 | Status: SHIPPED | OUTPATIENT
Start: 2024-10-29

## 2024-10-29 RX ORDER — GABAPENTIN 100 MG/1
CAPSULE ORAL
Qty: 180 CAPSULE | Refills: 1 | Status: SHIPPED | OUTPATIENT
Start: 2024-10-29

## 2024-11-12 ENCOUNTER — OFFICE VISIT (OUTPATIENT)
Dept: CARDIOLOGY CLINIC | Facility: CLINIC | Age: 74
End: 2024-11-12
Payer: MEDICARE

## 2024-11-12 VITALS
HEIGHT: 68 IN | SYSTOLIC BLOOD PRESSURE: 118 MMHG | WEIGHT: 201.5 LBS | HEART RATE: 61 BPM | BODY MASS INDEX: 30.54 KG/M2 | DIASTOLIC BLOOD PRESSURE: 82 MMHG

## 2024-11-12 DIAGNOSIS — R00.1 SINUS BRADYCARDIA: ICD-10-CM

## 2024-11-12 DIAGNOSIS — I50.9 CONGESTIVE HEART FAILURE, UNSPECIFIED HF CHRONICITY, UNSPECIFIED HEART FAILURE TYPE (HCC): Primary | ICD-10-CM

## 2024-11-12 DIAGNOSIS — I25.10 CORONARY ARTERY DISEASE INVOLVING NATIVE CORONARY ARTERY OF NATIVE HEART WITHOUT ANGINA PECTORIS: ICD-10-CM

## 2024-11-12 DIAGNOSIS — E78.2 MIXED HYPERLIPIDEMIA: ICD-10-CM

## 2024-11-12 PROCEDURE — 99214 OFFICE O/P EST MOD 30 MIN: CPT | Performed by: INTERNAL MEDICINE

## 2024-11-12 PROCEDURE — 93000 ELECTROCARDIOGRAM COMPLETE: CPT | Performed by: INTERNAL MEDICINE

## 2024-11-12 RX ORDER — VITAMIN B COMPLEX
1 CAPSULE ORAL DAILY
COMMUNITY

## 2024-11-12 NOTE — PROGRESS NOTES
Cardiology Followup     Alfred Tavares  [id number]  1950  [facility] [place] [id number] [facility] [address]    Consult for: CAD    Interval History: Alfred Tavares is a 74 y.o. year old male  who is here for followup of CAD.   Since his last visit, he has been feeling well.  he denies any palpitations, chest pain, shortness of breath, LE edema, orthopnea or PND.   He has pain in back of both legs with prolonged sitting.   Diet is overall unchanged.  There has not been a significant change in weight.    Most recent blood work showed LDL of 73 mg/dL which is improved from previous levels.  His A1c was 6.6%.     Past Cardiac History:    In February 2012, he had a myocardial infarction where he felt a band like pain across the front of his chest.  No associated dyspnea.  He underwent PCI *2 to mid LAD with Xience 3.5mm * 12mm  and 4.0 mm * 12mm stents.         Past Medical History:   Diagnosis Date    Acquired ankle/foot deformity     last assessed: 05/30/2017    Acute myocardial infarction (HCC)     last assessed: 12/03/2013    Anemia     last assessed: 12/03/2013    Anxiety     last assessed: 12/03/2013    Cardiomyopathy (HCC)     last assessed: 12/03/2013    Congestive heart failure (CHF) (HCC)     onset: 02/29/2012    Coronary artery disease     Diabetes mellitus (HCC)     Dysesthesia     last assessed: 09/10/2014    Exposure to hepatitis     last assessed: 11/15/2016    Foot slap     last assessed: 03/17/2016    Hypertension     Myocardial infarction (HCC)     Palsy of left sciatic nerve     last assessed: 03/17/2016    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    Subconjunctival hemorrhage     last assessed: 03/03/2014    Xerosis cutis     last assessed: 10/25/2016     Social History     Socioeconomic History    Marital status:      Spouse name: Not on file    Number of children: Not on file    Years of  education: Not on file    Highest education level: Not on file   Occupational History    Not on file   Tobacco Use    Smoking status: Never     Passive exposure: Never    Smokeless tobacco: Never   Vaping Use    Vaping status: Never Used   Substance and Sexual Activity    Alcohol use: No    Drug use: No    Sexual activity: Not on file   Other Topics Concern    Not on file   Social History Narrative    Not on file     Social Determinants of Health     Financial Resource Strain: Low Risk  (9/15/2023)    Overall Financial Resource Strain (CARDIA)     Difficulty of Paying Living Expenses: Not hard at all   Food Insecurity: No Food Insecurity (10/10/2024)    Nursing - Inadequate Food Risk Classification     Worried About Running Out of Food in the Last Year: Never true     Ran Out of Food in the Last Year: Never true     Ran Out of Food in the Last Year: Not on file   Transportation Needs: No Transportation Needs (10/10/2024)    PRAPARE - Transportation     Lack of Transportation (Medical): No     Lack of Transportation (Non-Medical): No   Physical Activity: Not on file   Stress: Not on file   Social Connections: Not on file   Intimate Partner Violence: Not on file   Housing Stability: Unknown (10/10/2024)    Housing Stability Vital Sign     Unable to Pay for Housing in the Last Year: No     Number of Times Moved in the Last Year: Not on file     Homeless in the Last Year: No      Family History   Problem Relation Age of Onset    Heart disease Mother         cardiac disorder    Leukemia Father     Ovarian cancer Sister      Past Surgical History:   Procedure Laterality Date    ANGIOPLASTY      2 coronary stents    CHOLECYSTECTOMY      COLONOSCOPY N/A 3/17/2016    Procedure: COLONOSCOPY;  Surgeon: Raleigh Escamilla MD;  Location: [facility];  Service:     HERNIA REPAIR      right inguinal        Current Outpatient Medications:     aspirin (ECOTRIN LOW STRENGTH) 81 mg EC tablet, Take 1 tablet (81 mg total) by mouth daily,  "Disp: 30 tablet, Rfl: 5    atorvastatin (LIPITOR) 40 mg tablet, TAKE ONE TABLET BY MOUTH EVERY DAY, Disp: 100 tablet, Rfl: 1    b complex vitamins capsule, Take 1 capsule by mouth daily, Disp: , Rfl:     clopidogrel (PLAVIX) 75 mg tablet, TAKE ONE TABLET BY MOUTH EVERY DAY, Disp: 90 tablet, Rfl: 1    Coenzyme Q10 (CoQ-10) 100 MG CAPS, Take 100 mg by mouth daily, Disp: , Rfl:     Contour Next Test test strip, TEST DAILY, Disp: 100 strip, Rfl: 3    gabapentin (NEURONTIN) 100 mg capsule, TAKE ONE CAPSULE BY MOUTH TWICE A DAY, Disp: 180 capsule, Rfl: 1    gabapentin (NEURONTIN) 400 mg capsule, Take 1 capsule (400 mg total) by mouth 4 (four) times a day, Disp: 360 capsule, Rfl: 3    metFORMIN (GLUCOPHAGE) 500 mg tablet, TAKE ONE TABLET BY MOUTH EVERY DAY WITH BREAKFAST, Disp: 90 tablet, Rfl: 1    metoprolol succinate (TOPROL-XL) 25 mg 24 hr tablet, TAKE ONE TABLET BY MOUTH EVERY DAY AT BEDTIME, Disp: 90 tablet, Rfl: 1    Microlet Lancets MISC, Use daily, Disp: 100 each, Rfl: 3    Multiple Vitamins-Minerals (CENTRUM SILVER 50+MEN PO), Take 1 tablet by mouth daily, Disp: , Rfl:     zinc gluconate 50 mg tablet, Take 50 mg by mouth daily, Disp: , Rfl:   No Known Allergies      Review of Systems:  Review of Systems   Constitutional:  Positive for fatigue.   Respiratory:  Negative for shortness of breath.    Cardiovascular:  Negative for chest pain, palpitations and leg swelling.   Musculoskeletal:  Positive for arthralgias and myalgias.   All other systems reviewed and are negative.      Physical Exam:  Vitals:    11/12/24 0822   BP: 118/82   BP Location: Right arm   Patient Position: Sitting   Cuff Size: Standard   Pulse: 61   Weight: 91.4 kg (201 lb 8 oz)   Height: 5' 8\" (1.727 m)     Physical Exam  Constitutional:       General: He is not in acute distress.     Appearance: Normal appearance. He is well-developed. He is not diaphoretic.   HENT:      Head: Normocephalic and atraumatic.   Eyes:      General: No scleral " icterus.     Conjunctiva/sclera: Conjunctivae normal.      Pupils: Pupils are equal, round, and reactive to light.   Neck:      Thyroid: No thyromegaly.      Vascular: No JVD.   Cardiovascular:      Rate and Rhythm: Normal rate and regular rhythm.      Heart sounds: Normal heart sounds. No murmur heard.     No friction rub. No gallop.   Pulmonary:      Effort: Pulmonary effort is normal.      Breath sounds: Normal breath sounds.   Musculoskeletal:      Cervical back: Neck supple.      Right lower leg: No edema.      Left lower leg: No edema.   Skin:     General: Skin is warm and dry.      Findings: No erythema or rash.   Neurological:      General: No focal deficit present.      Mental Status: He is alert and oriented to person, place, and time. Mental status is at baseline.   Psychiatric:         Mood and Affect: Mood normal.         Behavior: Behavior normal.         Thought Content: Thought content normal.         Judgment: Judgment normal.         Labs:  Lab Results   Component Value Date     12/16/2017    K 4.9 10/04/2024     10/04/2024    CO2 23 10/04/2024    BUN 20 10/04/2024    CREATININE 1.06 10/04/2024    CREATININE 0.97 12/16/2017    GLUCOSE 93 12/16/2017    CALCIUM 9.7 12/16/2017     Lab Results   Component Value Date    WBC 8.1 10/04/2024    WBC 8.3 12/09/2016    HGB 16.0 10/04/2024    HGB 16.0 12/09/2016    HCT 49.9 10/04/2024    HCT 48.4 12/09/2016    MCV 88 10/04/2024    MCV 89 12/09/2016     10/04/2024     12/09/2016     Lab Results   Component Value Date    CHOL 153 12/16/2017    TRIG 69 10/04/2024    HDL 56 10/04/2024    LDLDIRECT 80 10/14/2014     Imaging: No results found.  EKG (independently reviewed):  Sinus bradycardia at 60 beats per minute with nonspecific T-wave abnormalities    Discussion/Summary:  1. Coronary artery disease involving native coronary artery of native heart without angina pectoris  -  Last stress test was normal without any ischemia.  Good  functional capacity and normal BP response.  - patient has been doing well with diet modification and has overall lost a large amount of weight.  Continue working on diet modification.     2. Essential hypertension  - Continue benazepril   - No proteinuria on last urine study.    3. Myalgia  - Patient with bilateral pain in legs after sitting.  Low likelihood of PVD as he has no claudication symptoms.  If symptoms persist, will obtain vascular study of abdominal aorta and trial of withholding statin.      4. Mixed hyperlipidemia  - Last LDL was 73 mg/dL.  He is taking atorvastatin 40 mg daily.   Given history of CAD,  LDL goal should be less than 70.    - Discussed diet and weight loss.      5. History of myocardial infarction in adulthood  - patient wishes to remain on both aspirin and Plavix

## 2024-11-22 ENCOUNTER — TELEPHONE (OUTPATIENT)
Age: 74
End: 2024-11-22

## 2024-11-22 NOTE — TELEPHONE ENCOUNTER
Caller: Patient     Doctor: Dr. Reyez     Reason for call: Pt called & stated that he was to update Dr. Reyez on how his left leg hamstring is doing. Pt stated it's feeling much better and at times it does not even hurt anymore.     Call back#: [phone]

## 2025-01-07 ENCOUNTER — RA CDI HCC (OUTPATIENT)
Dept: OTHER | Facility: HOSPITAL | Age: 75
End: 2025-01-07

## 2025-01-10 LAB
ALBUMIN SERPL-MCNC: 4.6 G/DL (ref 3.8–4.8)
ALP SERPL-CCNC: 75 IU/L (ref 44–121)
ALT SERPL-CCNC: 37 IU/L (ref 0–44)
AST SERPL-CCNC: 26 IU/L (ref 0–40)
BILIRUB SERPL-MCNC: 0.5 MG/DL (ref 0–1.2)
BUN SERPL-MCNC: 18 MG/DL (ref 8–27)
BUN/CREAT SERPL: 19 (ref 10–24)
CALCIUM SERPL-MCNC: 9.7 MG/DL (ref 8.6–10.2)
CHLORIDE SERPL-SCNC: 105 MMOL/L (ref 96–106)
CHOLEST SERPL-MCNC: 127 MG/DL (ref 100–199)
CO2 SERPL-SCNC: 29 MMOL/L (ref 20–29)
CREAT SERPL-MCNC: 0.95 MG/DL (ref 0.76–1.27)
EGFR: 85 ML/MIN/1.73
ERYTHROCYTE [DISTWIDTH] IN BLOOD BY AUTOMATED COUNT: 12.9 % (ref 11.6–15.4)
EST. AVERAGE GLUCOSE BLD GHB EST-MCNC: 148 MG/DL
GLOBULIN SER-MCNC: 2.3 G/DL (ref 1.5–4.5)
GLUCOSE SERPL-MCNC: 115 MG/DL (ref 70–99)
HBA1C MFR BLD: 6.8 % (ref 4.8–5.6)
HCT VFR BLD AUTO: 47.9 % (ref 37.5–51)
HDLC SERPL-MCNC: 54 MG/DL
HGB BLD-MCNC: 15.3 G/DL (ref 13–17.7)
LDLC SERPL CALC-MCNC: 58 MG/DL (ref 0–99)
LDLC/HDLC SERPL: 1.1 RATIO (ref 0–3.6)
MCH RBC QN AUTO: 28.8 PG (ref 26.6–33)
MCHC RBC AUTO-ENTMCNC: 31.9 G/DL (ref 31.5–35.7)
MCV RBC AUTO: 90 FL (ref 79–97)
MICRODELETION SYND BLD/T FISH: NORMAL
PLATELET # BLD AUTO: 241 X10E3/UL (ref 150–450)
POTASSIUM SERPL-SCNC: 5.5 MMOL/L (ref 3.5–5.2)
PROT SERPL-MCNC: 6.9 G/DL (ref 6–8.5)
PSA SERPL-MCNC: 1.5 NG/ML (ref 0–4)
RBC # BLD AUTO: 5.31 X10E6/UL (ref 4.14–5.8)
SL AMB REFLEX CRITERIA: NORMAL
SL AMB VLDL CHOLESTEROL CALC: 15 MG/DL (ref 5–40)
SODIUM SERPL-SCNC: 145 MMOL/L (ref 134–144)
TRIGL SERPL-MCNC: 73 MG/DL (ref 0–149)
WBC # BLD AUTO: 8 X10E3/UL (ref 3.4–10.8)

## 2025-01-14 ENCOUNTER — OFFICE VISIT (OUTPATIENT)
Dept: FAMILY MEDICINE CLINIC | Facility: CLINIC | Age: 75
End: 2025-01-14
Payer: MEDICARE

## 2025-01-14 VITALS
DIASTOLIC BLOOD PRESSURE: 74 MMHG | HEIGHT: 68 IN | SYSTOLIC BLOOD PRESSURE: 138 MMHG | HEART RATE: 60 BPM | TEMPERATURE: 96 F | BODY MASS INDEX: 30.46 KG/M2 | WEIGHT: 201 LBS

## 2025-01-14 DIAGNOSIS — E87.5 HYPERKALEMIA: ICD-10-CM

## 2025-01-14 DIAGNOSIS — I70.209 ATHEROSCLEROSIS OF ARTERIES OF EXTREMITIES (HCC): ICD-10-CM

## 2025-01-14 DIAGNOSIS — I50.9 CONGESTIVE HEART FAILURE, UNSPECIFIED HF CHRONICITY, UNSPECIFIED HEART FAILURE TYPE (HCC): ICD-10-CM

## 2025-01-14 DIAGNOSIS — I10 ESSENTIAL HYPERTENSION: ICD-10-CM

## 2025-01-14 DIAGNOSIS — E78.2 MIXED HYPERLIPIDEMIA: ICD-10-CM

## 2025-01-14 DIAGNOSIS — E11.36 TYPE 2 DIABETES MELLITUS WITH DIABETIC CATARACT, WITHOUT LONG-TERM CURRENT USE OF INSULIN (HCC): Primary | ICD-10-CM

## 2025-01-14 LAB
BUN SERPL-MCNC: 14 MG/DL (ref 8–27)
BUN/CREAT SERPL: 16 (ref 10–24)
CALCIUM SERPL-MCNC: 10 MG/DL (ref 8.6–10.2)
CHLORIDE SERPL-SCNC: 101 MMOL/L (ref 96–106)
CO2 SERPL-SCNC: 26 MMOL/L (ref 20–29)
CREAT SERPL-MCNC: 0.89 MG/DL (ref 0.76–1.27)
EGFR: 90 ML/MIN/1.73
GLUCOSE SERPL-MCNC: 116 MG/DL (ref 70–99)
POTASSIUM SERPL-SCNC: 4.7 MMOL/L (ref 3.5–5.2)
SODIUM SERPL-SCNC: 141 MMOL/L (ref 134–144)

## 2025-01-14 PROCEDURE — 99214 OFFICE O/P EST MOD 30 MIN: CPT | Performed by: FAMILY MEDICINE

## 2025-01-14 NOTE — ASSESSMENT & PLAN NOTE
Stable  Continue atorvastatin 40 mg daily  Orders:  •  Comprehensive metabolic panel; Future  •  Lipid Panel with Direct LDL reflex; Future

## 2025-01-14 NOTE — PROGRESS NOTES
"Name: Alfred Tavares      : 1950      MRN: [id number]  Encounter Provider: Yahaira Koehler DO  Encounter Date: 2025   Encounter department: [facility]  :  Assessment & Plan  Type 2 diabetes mellitus with diabetic cataract, without long-term current use of insulin (HCC)  Diabetes is well-controlled  Continue metformin 500 mg daily with breakfast  Lab Results   Component Value Date    HGBA1C 6.8 (H) 01/10/2025     Orders:  •  Hemoglobin A1C; Future    Congestive heart failure, unspecified HF chronicity, unspecified heart failure type (HCC)  Wt Readings from Last 3 Encounters:   25 91.2 kg (201 lb)   24 91.4 kg (201 lb 8 oz)   10/10/24 91.2 kg (201 lb)     Stable  Following with cardiology             Atherosclerosis of arteries of extremities (HCC)  Stable  Continue atorvastatin 40 mg daily       Essential hypertension  Blood pressure is well-controlled  Continue metoprolol 25 mg daily  Orders:  •  CBC; Future  •  Comprehensive metabolic panel; Future  •  Lipid Panel with Direct LDL reflex; Future    Mixed hyperlipidemia  Stable  Continue atorvastatin 40 mg daily  Orders:  •  Comprehensive metabolic panel; Future  •  Lipid Panel with Direct LDL reflex; Future    Hyperkalemia  New, suspect lab error  Will go for repeat BMP this week  Orders:  •  Basic metabolic panel; Future    Return in about 3 months (around 2025) for Next scheduled follow up.       History of Present Illness     He has seen his cardiologist.  He continues to follow with diabetic diet.  He is staying active at home.  He is staying active with his lady friend      Review of Systems    Objective   /74   Pulse 60   Temp (!) 96 °F (35.6 °C)   Ht 5' 8\" (1.727 m)   Wt 91.2 kg (201 lb)   BMI 30.56 kg/m²      Physical Exam  Vitals and nursing note reviewed.   Constitutional:       General: He is not in acute distress.     Appearance: He is well-developed.   HENT:      Right Ear: Tympanic membrane normal. "      Left Ear: Tympanic membrane normal.   Cardiovascular:      Rate and Rhythm: Normal rate and regular rhythm.      Heart sounds: No murmur heard.  Pulmonary:      Effort: Pulmonary effort is normal. No respiratory distress.      Breath sounds: Normal breath sounds.   Neurological:      Mental Status: He is alert.   Psychiatric:         Mood and Affect: Mood normal.

## 2025-01-14 NOTE — ASSESSMENT & PLAN NOTE
Diabetes is well-controlled  Continue metformin 500 mg daily with breakfast  Lab Results   Component Value Date    HGBA1C 6.8 (H) 01/10/2025     Orders:  •  Hemoglobin A1C; Future

## 2025-01-14 NOTE — ASSESSMENT & PLAN NOTE
Blood pressure is well-controlled  Continue metoprolol 25 mg daily  Orders:  •  CBC; Future  •  Comprehensive metabolic panel; Future  •  Lipid Panel with Direct LDL reflex; Future

## 2025-01-14 NOTE — ASSESSMENT & PLAN NOTE
Wt Readings from Last 3 Encounters:   01/14/25 91.2 kg (201 lb)   11/12/24 91.4 kg (201 lb 8 oz)   10/10/24 91.2 kg (201 lb)     Stable  Following with cardiology

## 2025-01-15 ENCOUNTER — RESULTS FOLLOW-UP (OUTPATIENT)
Dept: FAMILY MEDICINE CLINIC | Facility: CLINIC | Age: 75
End: 2025-01-15

## 2025-01-19 DIAGNOSIS — E78.2 MIXED HYPERLIPIDEMIA: ICD-10-CM

## 2025-01-19 DIAGNOSIS — I25.10 CORONARY ARTERY DISEASE INVOLVING NATIVE CORONARY ARTERY OF NATIVE HEART WITHOUT ANGINA PECTORIS: ICD-10-CM

## 2025-01-20 RX ORDER — ATORVASTATIN CALCIUM 40 MG/1
40 TABLET, FILM COATED ORAL DAILY
Qty: 100 TABLET | Refills: 1 | Status: SHIPPED | OUTPATIENT
Start: 2025-01-20

## 2025-02-04 ENCOUNTER — PATIENT MESSAGE (OUTPATIENT)
Dept: FAMILY MEDICINE CLINIC | Facility: CLINIC | Age: 75
End: 2025-02-04

## 2025-02-10 DIAGNOSIS — I21.9 MYOCARDIAL INFARCTION, UNSPECIFIED MI TYPE, UNSPECIFIED ARTERY (HCC): ICD-10-CM

## 2025-02-11 RX ORDER — CLOPIDOGREL BISULFATE 75 MG/1
75 TABLET ORAL DAILY
Qty: 90 TABLET | Refills: 1 | Status: SHIPPED | OUTPATIENT
Start: 2025-02-11

## 2025-03-06 LAB
LEFT EYE DIABETIC RETINOPATHY: NORMAL
RIGHT EYE DIABETIC RETINOPATHY: NORMAL

## 2025-04-07 ENCOUNTER — RA CDI HCC (OUTPATIENT)
Dept: OTHER | Facility: HOSPITAL | Age: 75
End: 2025-04-07

## 2025-04-07 NOTE — PROGRESS NOTES
HCC coding opportunities          Chart Reviewed number of suggestions sent to Provider: 2   I11.0  E11.42    Patients Insurance     Medicare Insurance: Medicare

## 2025-04-12 LAB
ALBUMIN SERPL-MCNC: 4.6 G/DL (ref 3.8–4.8)
ALP SERPL-CCNC: 80 IU/L (ref 44–121)
ALT SERPL-CCNC: 38 IU/L (ref 0–44)
AST SERPL-CCNC: 27 IU/L (ref 0–40)
BILIRUB SERPL-MCNC: 0.7 MG/DL (ref 0–1.2)
BUN SERPL-MCNC: 13 MG/DL (ref 8–27)
BUN/CREAT SERPL: 15 (ref 10–24)
CALCIUM SERPL-MCNC: 9.9 MG/DL (ref 8.6–10.2)
CHLORIDE SERPL-SCNC: 101 MMOL/L (ref 96–106)
CHOLEST SERPL-MCNC: 126 MG/DL (ref 100–199)
CO2 SERPL-SCNC: 24 MMOL/L (ref 20–29)
CREAT SERPL-MCNC: 0.87 MG/DL (ref 0.76–1.27)
EGFR: 91 ML/MIN/1.73
ERYTHROCYTE [DISTWIDTH] IN BLOOD BY AUTOMATED COUNT: 12.6 % (ref 11.6–15.4)
EST. AVERAGE GLUCOSE BLD GHB EST-MCNC: 146 MG/DL
GLOBULIN SER-MCNC: 2.5 G/DL (ref 1.5–4.5)
GLUCOSE SERPL-MCNC: 101 MG/DL (ref 70–99)
HBA1C MFR BLD: 6.7 % (ref 4.8–5.6)
HCT VFR BLD AUTO: 45.9 % (ref 37.5–51)
HDLC SERPL-MCNC: 52 MG/DL
HGB BLD-MCNC: 15.3 G/DL (ref 13–17.7)
LDLC SERPL CALC-MCNC: 57 MG/DL (ref 0–99)
LDLC/HDLC SERPL: 1.1 RATIO (ref 0–3.6)
MCH RBC QN AUTO: 29.7 PG (ref 26.6–33)
MCHC RBC AUTO-ENTMCNC: 33.3 G/DL (ref 31.5–35.7)
MCV RBC AUTO: 89 FL (ref 79–97)
MICRODELETION SYND BLD/T FISH: NORMAL
PLATELET # BLD AUTO: 222 X10E3/UL (ref 150–450)
POTASSIUM SERPL-SCNC: 5.1 MMOL/L (ref 3.5–5.2)
PROT SERPL-MCNC: 7.1 G/DL (ref 6–8.5)
RBC # BLD AUTO: 5.15 X10E6/UL (ref 4.14–5.8)
SL AMB VLDL CHOLESTEROL CALC: 17 MG/DL (ref 5–40)
SODIUM SERPL-SCNC: 142 MMOL/L (ref 134–144)
TRIGL SERPL-MCNC: 89 MG/DL (ref 0–149)
WBC # BLD AUTO: 7.5 X10E3/UL (ref 3.4–10.8)

## 2025-04-16 ENCOUNTER — OFFICE VISIT (OUTPATIENT)
Dept: FAMILY MEDICINE CLINIC | Facility: CLINIC | Age: 75
End: 2025-04-16
Payer: MEDICARE

## 2025-04-16 VITALS
HEIGHT: 68 IN | SYSTOLIC BLOOD PRESSURE: 124 MMHG | HEART RATE: 60 BPM | DIASTOLIC BLOOD PRESSURE: 74 MMHG | TEMPERATURE: 96.7 F | WEIGHT: 198 LBS | RESPIRATION RATE: 20 BRPM | BODY MASS INDEX: 30.01 KG/M2

## 2025-04-16 DIAGNOSIS — E11.36 TYPE 2 DIABETES MELLITUS WITH DIABETIC CATARACT, WITHOUT LONG-TERM CURRENT USE OF INSULIN (HCC): Primary | ICD-10-CM

## 2025-04-16 DIAGNOSIS — Z79.899 ENCOUNTER FOR LONG-TERM CURRENT USE OF MEDICATION: ICD-10-CM

## 2025-04-16 DIAGNOSIS — E78.2 MIXED HYPERLIPIDEMIA: ICD-10-CM

## 2025-04-16 DIAGNOSIS — I10 ESSENTIAL HYPERTENSION: ICD-10-CM

## 2025-04-16 PROCEDURE — 99214 OFFICE O/P EST MOD 30 MIN: CPT | Performed by: FAMILY MEDICINE

## 2025-04-16 PROCEDURE — G2211 COMPLEX E/M VISIT ADD ON: HCPCS | Performed by: FAMILY MEDICINE

## 2025-04-16 NOTE — PROGRESS NOTES
"Name: Alfred Tavares      : 1950      MRN: [id number]  Encounter Provider: Yahaira Koehler DO  Encounter Date: 2025   Encounter department: [facility]  :  Assessment & Plan  Type 2 diabetes mellitus with diabetic cataract, without long-term current use of insulin (HCC)  Well-controlled  Continue metformin 500 mg daily  Lab Results   Component Value Date    HGBA1C 6.7 (H) 2025       Orders:  •  Hemoglobin A1C; Future  •  Albumin / creatinine urine ratio; Future    Mixed hyperlipidemia  LDL is below 100  Continue atorvastatin 40 mg daily  Orders:  •  Comprehensive metabolic panel; Future  •  Lipid Panel with Direct LDL reflex; Future    Essential hypertension  Well-controlled  Continue metoprolol 25 mg daily  Orders:  •  CBC; Future  •  Comprehensive metabolic panel; Future  •  Lipid Panel with Direct LDL reflex; Future    Encounter for long-term current use of medication    Orders:  •  Vitamin B12; Future    Return in about 3 months (around 2025) for Next scheduled follow up.       History of Present Illness   He has been good about his diet. He is staying active. He is helping his lady friend.  He is following with his foot doctor every 12 weeks      Review of Systems    Objective   /74   Pulse 60   Temp (!) 96.7 °F (35.9 °C)   Resp 20   Ht 5' 8\" (1.727 m)   Wt 89.8 kg (198 lb)   BMI 30.11 kg/m²      Physical Exam  Vitals and nursing note reviewed.   Constitutional:       General: He is not in acute distress.     Appearance: He is well-developed.   HENT:      Right Ear: Tympanic membrane normal.      Left Ear: Tympanic membrane normal.   Cardiovascular:      Rate and Rhythm: Normal rate and regular rhythm.      Pulses: no weak pulses.           Dorsalis pedis pulses are 2+ on the right side and 2+ on the left side.        Posterior tibial pulses are 2+ on the right side and 2+ on the left side.      Heart sounds: No murmur heard.  Pulmonary:      Effort: Pulmonary " effort is normal. No respiratory distress.      Breath sounds: Normal breath sounds.   Feet:      Right foot:      Skin integrity: No ulcer, skin breakdown, erythema, warmth, callus or dry skin.      Left foot:      Skin integrity: No ulcer, skin breakdown, erythema, warmth, callus or dry skin.   Neurological:      Mental Status: He is alert.   Psychiatric:         Mood and Affect: Mood normal.     Patient's shoes and socks removed.    Right Foot/Ankle   Right Foot Inspection  Skin Exam: skin normal. Skin not intact, no dry skin, no warmth, no callus, no erythema, no maceration, no abnormal color, no pre-ulcer, no ulcer and no callus.     Toe Exam: ROM and strength within normal limits.     Sensory   Monofilament testing: intact    Vascular  Capillary refills: < 3 seconds  The right DP pulse is 2+. The right PT pulse is 2+.     Left Foot/Ankle  Left Foot Inspection  Skin Exam: skin normal. Skin not intact, no dry skin, no warmth, no erythema, no maceration, normal color, no pre-ulcer, no ulcer and no callus.     Toe Exam: ROM and strength within normal limits.     Sensory   Monofilament testing: intact    Vascular  Capillary refills: < 3 seconds  The left DP pulse is 2+. The left PT pulse is 2+.     Assign Risk Category  No deformity present  No loss of protective sensation  No weak pulses  Risk: 0

## 2025-04-16 NOTE — ASSESSMENT & PLAN NOTE
Well-controlled  Continue metformin 500 mg daily  Lab Results   Component Value Date    HGBA1C 6.7 (H) 04/11/2025       Orders:  •  Hemoglobin A1C; Future  •  Albumin / creatinine urine ratio; Future

## 2025-04-16 NOTE — ASSESSMENT & PLAN NOTE
Well-controlled  Continue metoprolol 25 mg daily  Orders:  •  CBC; Future  •  Comprehensive metabolic panel; Future  •  Lipid Panel with Direct LDL reflex; Future

## 2025-04-16 NOTE — ASSESSMENT & PLAN NOTE
LDL is below 100  Continue atorvastatin 40 mg daily  Orders:  •  Comprehensive metabolic panel; Future  •  Lipid Panel with Direct LDL reflex; Future

## 2025-04-28 DIAGNOSIS — G60.9 IDIOPATHIC PERIPHERAL NEUROPATHY: ICD-10-CM

## 2025-04-29 RX ORDER — GABAPENTIN 100 MG/1
100 CAPSULE ORAL 2 TIMES DAILY
Qty: 180 CAPSULE | Refills: 1 | Status: SHIPPED | OUTPATIENT
Start: 2025-04-29

## 2025-05-13 DIAGNOSIS — E11.42 DIABETIC POLYNEUROPATHY ASSOCIATED WITH TYPE 2 DIABETES MELLITUS (HCC): ICD-10-CM

## 2025-07-11 ENCOUNTER — OFFICE VISIT (OUTPATIENT)
Dept: CARDIOLOGY CLINIC | Facility: CLINIC | Age: 75
End: 2025-07-11
Payer: MEDICARE

## 2025-07-11 VITALS
HEART RATE: 50 BPM | DIASTOLIC BLOOD PRESSURE: 80 MMHG | OXYGEN SATURATION: 95 % | BODY MASS INDEX: 30.62 KG/M2 | WEIGHT: 202 LBS | HEIGHT: 68 IN | SYSTOLIC BLOOD PRESSURE: 130 MMHG

## 2025-07-11 DIAGNOSIS — E78.2 MIXED HYPERLIPIDEMIA: ICD-10-CM

## 2025-07-11 DIAGNOSIS — I50.9 CONGESTIVE HEART FAILURE, UNSPECIFIED HF CHRONICITY, UNSPECIFIED HEART FAILURE TYPE (HCC): Primary | ICD-10-CM

## 2025-07-11 DIAGNOSIS — R00.1 SINUS BRADYCARDIA: ICD-10-CM

## 2025-07-11 DIAGNOSIS — I25.10 CORONARY ARTERY DISEASE INVOLVING NATIVE CORONARY ARTERY OF NATIVE HEART WITHOUT ANGINA PECTORIS: ICD-10-CM

## 2025-07-11 PROCEDURE — 93000 ELECTROCARDIOGRAM COMPLETE: CPT | Performed by: INTERNAL MEDICINE

## 2025-07-11 PROCEDURE — 99214 OFFICE O/P EST MOD 30 MIN: CPT | Performed by: INTERNAL MEDICINE

## 2025-07-11 NOTE — PROGRESS NOTES
Cardiology Followup     Alfred Tavares  [id number]  1950  [facility] [place]  [address] [address]    Consult for: CAD    Interval History: Alfred Tavares is a 75 y.o. year old male  who is here for followup of CAD.   Since his last visit, he has been feeling well.  he denies any palpitations, chest pain, shortness of breath, LE edema, orthopnea or PND.   Diet is overall unchanged.  There has not been a significant change in weight.       Most recent blood work showed LDL of 57 mg/dL which is improved from previous levels.  His A1c was 6.7%.     Past Cardiac History:    In February 2012, he had a myocardial infarction where he felt a band like pain across the front of his chest.  No associated dyspnea.  He underwent PCI *2 to mid LAD with Xience 3.5mm * 12mm  and 4.0 mm * 12mm stents.         Past Medical History:   Diagnosis Date    Acquired ankle/foot deformity     last assessed: 05/30/2017    Acute myocardial infarction (HCC)     last assessed: 12/03/2013    Anemia     last assessed: 12/03/2013    Anxiety     last assessed: 12/03/2013    Cardiomyopathy (HCC)     last assessed: 12/03/2013    Congestive heart failure (CHF) (HCC)     onset: 02/29/2012    Coronary artery disease     Diabetes mellitus (HCC)     Dysesthesia     last assessed: 09/10/2014    Exposure to hepatitis     last assessed: 11/15/2016    Foot slap     last assessed: 03/17/2016    Hypertension     Myocardial infarction (HCC)     Palsy of left sciatic nerve     last assessed: 03/17/2016    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    Subconjunctival hemorrhage     last assessed: 03/03/2014    Xerosis cutis     last assessed: 10/25/2016     Social History     Socioeconomic History    Marital status:      Spouse name: Not on file    Number of children: Not on file    Years of education: Not on file    Highest education level: Not on file    Occupational History    Not on file   Tobacco Use    Smoking status: Never     Passive exposure: Never    Smokeless tobacco: Never   Vaping Use    Vaping status: Never Used   Substance and Sexual Activity    Alcohol use: No    Drug use: No    Sexual activity: Not on file   Other Topics Concern    Not on file   Social History Narrative    Not on file     Social Drivers of Health     Financial Resource Strain: Low Risk  (9/15/2023)    Overall Financial Resource Strain (CARDIA)     Difficulty of Paying Living Expenses: Not hard at all   Food Insecurity: No Food Insecurity (10/10/2024)    Nursing - Inadequate Food Risk Classification     Worried About Running Out of Food in the Last Year: Never true     Ran Out of Food in the Last Year: Never true     Ran Out of Food in the Last Year: Not on file   Transportation Needs: No Transportation Needs (10/10/2024)    PRAPARE - Transportation     Lack of Transportation (Medical): No     Lack of Transportation (Non-Medical): No   Physical Activity: Not on file   Stress: Not on file   Social Connections: Not on file   Intimate Partner Violence: Not on file   Housing Stability: Unknown (10/10/2024)    Housing Stability Vital Sign     Unable to Pay for Housing in the Last Year: No     Number of Times Moved in the Last Year: Not on file     Homeless in the Last Year: No      Family History   Problem Relation Name Age of Onset    Heart disease Mother          cardiac disorder    Leukemia Father      Ovarian cancer Sister       Past Surgical History:   Procedure Laterality Date    ANGIOPLASTY      2 coronary stents    CHOLECYSTECTOMY      COLONOSCOPY N/A 3/17/2016    Procedure: COLONOSCOPY;  Surgeon: Raleigh Escamilla MD;  Location: [facility];  Service:     HERNIA REPAIR      right inguinal        Current Outpatient Medications:     aspirin (ECOTRIN LOW STRENGTH) 81 mg EC tablet, Take 1 tablet (81 mg total) by mouth daily, Disp: 30 tablet, Rfl: 5    atorvastatin (LIPITOR) 40 mg  "tablet, TAKE ONE TABLET BY MOUTH EVERY DAY, Disp: 100 tablet, Rfl: 1    b complex vitamins capsule, Take 1 capsule by mouth in the morning., Disp: , Rfl:     clopidogrel (PLAVIX) 75 mg tablet, TAKE ONE TABLET BY MOUTH EVERY DAY, Disp: 90 tablet, Rfl: 1    Coenzyme Q10 (CoQ-10) 100 MG CAPS, Take 100 mg by mouth in the morning., Disp: , Rfl:     Contour Next Test test strip, TEST DAILY, Disp: 100 strip, Rfl: 3    gabapentin (NEURONTIN) 100 mg capsule, TAKE ONE CAPSULE BY MOUTH TWICE A DAY, Disp: 180 capsule, Rfl: 1    gabapentin (NEURONTIN) 400 mg capsule, Take 1 capsule (400 mg total) by mouth 4 (four) times a day, Disp: 360 capsule, Rfl: 3    metFORMIN (GLUCOPHAGE) 500 mg tablet, TAKE ONE TABLET BY MOUTH EVERY DAY WITH BREAKFAST, Disp: 90 tablet, Rfl: 1    metoprolol succinate (TOPROL-XL) 25 mg 24 hr tablet, TAKE ONE TABLET BY MOUTH EVERY DAY AT BEDTIME, Disp: 90 tablet, Rfl: 1    Microlet Lancets MISC, Use daily, Disp: 100 each, Rfl: 3    Multiple Vitamins-Minerals (CENTRUM SILVER 50+MEN PO), Take 1 tablet by mouth in the morning., Disp: , Rfl:     zinc gluconate 50 mg tablet, Take 50 mg by mouth in the morning., Disp: , Rfl:   No Known Allergies      Review of Systems:  Review of Systems   Respiratory:  Negative for shortness of breath.    Cardiovascular:  Negative for chest pain, palpitations and leg swelling.   All other systems reviewed and are negative.      Physical Exam:  Vitals:    07/11/25 0814   BP: 130/80   BP Location: Left arm   Patient Position: Sitting   Cuff Size: Large   Pulse: (!) 50   SpO2: 95%   Weight: 91.6 kg (202 lb)   Height: 5' 8\" (1.727 m)     Physical Exam  Constitutional:       General: He is not in acute distress.     Appearance: Normal appearance. He is well-developed. He is not diaphoretic.   HENT:      Head: Normocephalic and atraumatic.     Eyes:      General: No scleral icterus.     Conjunctiva/sclera: Conjunctivae normal.      Pupils: Pupils are equal, round, and reactive to " light.     Neck:      Thyroid: No thyromegaly.      Vascular: No JVD.     Cardiovascular:      Rate and Rhythm: Normal rate and regular rhythm.      Heart sounds: Normal heart sounds. No murmur heard.     No friction rub. No gallop.   Pulmonary:      Effort: Pulmonary effort is normal.      Breath sounds: Normal breath sounds.     Musculoskeletal:      Cervical back: Neck supple.      Right lower leg: No edema.      Left lower leg: No edema.     Skin:     General: Skin is warm and dry.      Findings: No erythema or rash.     Neurological:      General: No focal deficit present.      Mental Status: He is alert and oriented to person, place, and time. Mental status is at baseline.     Psychiatric:         Mood and Affect: Mood normal.         Behavior: Behavior normal.         Thought Content: Thought content normal.         Judgment: Judgment normal.         Labs:  Lab Results   Component Value Date     12/16/2017    K 5.1 04/11/2025     04/11/2025    CO2 24 04/11/2025    BUN 13 04/11/2025    CREATININE 0.87 04/11/2025    CREATININE 0.97 12/16/2017    GLUCOSE 93 12/16/2017    CALCIUM 9.7 12/16/2017     Lab Results   Component Value Date    WBC 7.5 04/11/2025    WBC 8.3 12/09/2016    HGB 15.3 04/11/2025    HGB 16.0 12/09/2016    HCT 45.9 04/11/2025    HCT 48.4 12/09/2016    MCV 89 04/11/2025    MCV 89 12/09/2016     04/11/2025     12/09/2016     Lab Results   Component Value Date    CHOL 153 12/16/2017    TRIG 89 04/11/2025    HDL 52 04/11/2025    LDLDIRECT 80 10/14/2014     Imaging: No results found.  EKG (independently reviewed):  Sinus bradycardia at 50 beats per minute with nonspecific T-wave abnormalities    Discussion/Summary:  1. Coronary artery disease involving native coronary artery of native heart without angina pectoris  -  Last stress test was normal without any ischemia.  Good functional capacity and normal BP response.  - patient has been doing well with diet modification and  has overall lost a large amount of weight.  Continue working on diet modification.     2. Essential hypertension  - Continue benazepril   - No proteinuria on last urine study.    3. Mixed hyperlipidemia  - Last LDL was 57 mg/dL.  He is taking atorvastatin 40 mg daily.   Given history of CAD,  LDL goal should be less than 70.    - Discussed diet and weight loss.      4. History of myocardial infarction in adulthood  - patient wishes to remain on both aspirin and Plavix

## 2025-07-21 DIAGNOSIS — E78.2 MIXED HYPERLIPIDEMIA: ICD-10-CM

## 2025-07-21 DIAGNOSIS — I25.10 CORONARY ARTERY DISEASE INVOLVING NATIVE CORONARY ARTERY OF NATIVE HEART WITHOUT ANGINA PECTORIS: ICD-10-CM

## 2025-07-22 RX ORDER — ATORVASTATIN CALCIUM 40 MG/1
40 TABLET, FILM COATED ORAL DAILY
Qty: 90 TABLET | Refills: 1 | Status: SHIPPED | OUTPATIENT
Start: 2025-07-22

## 2025-07-29 ENCOUNTER — OFFICE VISIT (OUTPATIENT)
Dept: FAMILY MEDICINE CLINIC | Facility: CLINIC | Age: 75
End: 2025-07-29
Payer: MEDICARE

## 2025-07-29 VITALS
HEIGHT: 68 IN | SYSTOLIC BLOOD PRESSURE: 132 MMHG | BODY MASS INDEX: 29.98 KG/M2 | RESPIRATION RATE: 16 BRPM | DIASTOLIC BLOOD PRESSURE: 82 MMHG | TEMPERATURE: 98.5 F | HEART RATE: 68 BPM | WEIGHT: 197.8 LBS

## 2025-07-29 DIAGNOSIS — E78.2 MIXED HYPERLIPIDEMIA: ICD-10-CM

## 2025-07-29 DIAGNOSIS — I10 ESSENTIAL HYPERTENSION: ICD-10-CM

## 2025-07-29 DIAGNOSIS — E11.42 DIABETIC POLYNEUROPATHY ASSOCIATED WITH TYPE 2 DIABETES MELLITUS (HCC): Primary | ICD-10-CM

## 2025-07-29 DIAGNOSIS — G60.9 IDIOPATHIC PERIPHERAL NEUROPATHY: ICD-10-CM

## 2025-07-29 DIAGNOSIS — I21.9 MYOCARDIAL INFARCTION, UNSPECIFIED MI TYPE, UNSPECIFIED ARTERY (HCC): ICD-10-CM

## 2025-07-29 PROCEDURE — G2211 COMPLEX E/M VISIT ADD ON: HCPCS | Performed by: FAMILY MEDICINE

## 2025-07-29 PROCEDURE — 99214 OFFICE O/P EST MOD 30 MIN: CPT | Performed by: FAMILY MEDICINE

## 2025-07-29 RX ORDER — CLOPIDOGREL BISULFATE 75 MG/1
75 TABLET ORAL DAILY
Qty: 90 TABLET | Refills: 1 | Status: SHIPPED | OUTPATIENT
Start: 2025-07-29

## 2025-07-29 RX ORDER — GABAPENTIN 400 MG/1
400 CAPSULE ORAL 4 TIMES DAILY
Qty: 360 CAPSULE | Refills: 3 | Status: SHIPPED | OUTPATIENT
Start: 2025-07-29

## 2025-07-30 LAB
ALBUMIN SERPL-MCNC: 4.5 G/DL (ref 3.8–4.8)
ALBUMIN/CREAT UR: 16 MG/G CREAT (ref 0–29)
ALP SERPL-CCNC: 75 IU/L (ref 44–121)
ALT SERPL-CCNC: 33 IU/L (ref 0–44)
AST SERPL-CCNC: 21 IU/L (ref 0–40)
BILIRUB SERPL-MCNC: 0.8 MG/DL (ref 0–1.2)
BUN SERPL-MCNC: 18 MG/DL (ref 8–27)
BUN/CREAT SERPL: 21 (ref 10–24)
CALCIUM SERPL-MCNC: 9.9 MG/DL (ref 8.6–10.2)
CHLORIDE SERPL-SCNC: 100 MMOL/L (ref 96–106)
CHOLEST SERPL-MCNC: 133 MG/DL (ref 100–199)
CO2 SERPL-SCNC: 23 MMOL/L (ref 20–29)
CREAT SERPL-MCNC: 0.86 MG/DL (ref 0.76–1.27)
CREAT UR-MCNC: 95 MG/DL
EGFR: 91 ML/MIN/1.73
ERYTHROCYTE [DISTWIDTH] IN BLOOD BY AUTOMATED COUNT: 12.7 % (ref 11.6–15.4)
EST. AVERAGE GLUCOSE BLD GHB EST-MCNC: 146 MG/DL
GLOBULIN SER-MCNC: 2.4 G/DL (ref 1.5–4.5)
GLUCOSE SERPL-MCNC: 96 MG/DL (ref 70–99)
HBA1C MFR BLD: 6.7 % (ref 4.8–5.6)
HCT VFR BLD AUTO: 47.7 % (ref 37.5–51)
HDLC SERPL-MCNC: 59 MG/DL
HGB BLD-MCNC: 15.6 G/DL (ref 13–17.7)
LDLC SERPL CALC-MCNC: 61 MG/DL (ref 0–99)
LDLC/HDLC SERPL: 1 RATIO (ref 0–3.6)
MCH RBC QN AUTO: 29.3 PG (ref 26.6–33)
MCHC RBC AUTO-ENTMCNC: 32.7 G/DL (ref 31.5–35.7)
MCV RBC AUTO: 90 FL (ref 79–97)
MICROALBUMIN UR-MCNC: 14.9 UG/ML
MICRODELETION SYND BLD/T FISH: NORMAL
PLATELET # BLD AUTO: 212 X10E3/UL (ref 150–450)
POTASSIUM SERPL-SCNC: 4.6 MMOL/L (ref 3.5–5.2)
PROT SERPL-MCNC: 6.9 G/DL (ref 6–8.5)
RBC # BLD AUTO: 5.32 X10E6/UL (ref 4.14–5.8)
SL AMB VLDL CHOLESTEROL CALC: 13 MG/DL (ref 5–40)
SODIUM SERPL-SCNC: 140 MMOL/L (ref 134–144)
TRIGL SERPL-MCNC: 59 MG/DL (ref 0–149)
VIT B12 SERPL-MCNC: 1603 PG/ML (ref 232–1245)
WBC # BLD AUTO: 9.2 X10E3/UL (ref 3.4–10.8)